# Patient Record
Sex: FEMALE | Race: BLACK OR AFRICAN AMERICAN | Employment: UNEMPLOYED | URBAN - METROPOLITAN AREA
[De-identification: names, ages, dates, MRNs, and addresses within clinical notes are randomized per-mention and may not be internally consistent; named-entity substitution may affect disease eponyms.]

---

## 2017-03-15 ENCOUNTER — OFFICE VISIT (OUTPATIENT)
Dept: FAMILY MEDICINE | Facility: CLINIC | Age: 57
End: 2017-03-15

## 2017-03-15 VITALS
TEMPERATURE: 97.4 F | DIASTOLIC BLOOD PRESSURE: 90 MMHG | BODY MASS INDEX: 27.45 KG/M2 | WEIGHT: 153 LBS | HEART RATE: 81 BPM | OXYGEN SATURATION: 98 % | SYSTOLIC BLOOD PRESSURE: 128 MMHG | RESPIRATION RATE: 18 BRPM

## 2017-03-15 DIAGNOSIS — M81.0 OSTEOPOROSIS: ICD-10-CM

## 2017-03-15 DIAGNOSIS — M54.50 ACUTE BILATERAL LOW BACK PAIN WITHOUT SCIATICA: ICD-10-CM

## 2017-03-15 RX ORDER — ALENDRONATE SODIUM 70 MG/1
70 TABLET ORAL
Qty: 30 TABLET | Refills: 3 | Status: SHIPPED
Start: 2017-03-15 | End: 2017-08-15

## 2017-03-15 ASSESSMENT — PAIN SCALES - GENERAL: PAINLEVEL: MODERATE PAIN (5)

## 2017-03-15 NOTE — PROGRESS NOTES
HPI:       Cindy Espinoza is a 57 year old who presents for the following  Patient presents with:  Back Pain: bilateral low back pain started last night and wrapping around into left side of lower abdomin       This is a 57 year old female with a past medical history significant for chronic back pain and osteoporosis who presented to the clinic with back pain.     Back Pain and lower abdominal pain      Duration: Chronic pain that has progressed         Specific cause: none    Description:   Location of pain: low back left  Character of pain: dull ache  Pain radiation:Radiates to the abdominal area.   New numbness or weakness in legs, not attributed to pain:  No   Any new bowel or bladder incontinence?    No     Intensity: mild    History:   Pain interferes with job/home/school: No   History of back problems: Patient has a history of similar back pains.  Any previous MRI or X-rays: Yes- at [facility].  Date 2012: MRI revealed degenerative disc disease causing mild bilateral neuro foraminal narrowing at L5-S1   Sees a specialist for back pain:  Yes per patient. Discussion regarding different therapies were made including: Physical therapy, steroid injections and surgery.   Therapies tried without relief: Physical therapy was tried but patient only went once.     Alleviating factors:   Improved by: Tylenol       Precipitating factors:  Worsened by: Nothing   Accompanying Signs & Symptoms:  Risk of Fracture:  YES: She has a history of osteoporosis and was previously on Fosamax and Calcium and Vitamin D. She reports that she has been off mentioned medications in a while.   Risk of Cauda Equina:No   Risk of Infection:  No   Risk of Cancer:  No        A [facility]  was used for  this visit.      Problem, Medication and Allergy Lists were   reviewed and are current.     Patient Active Problem List    Diagnosis Date Noted     Low back pain 08/11/2015     Priority: Medium     Diagnosis updated by automated process.  Provider to review and confirm.       Caregiver burden 06/11/2015     TMJ (temporomandibular joint syndrome) 07/23/2014 7/18/14 ENT referral note, ordered CT (results pending), possible need for TMJ clinic referral.       Menopause present 09/11/2012     Fibromyalgia 09/11/2012     Chronic tension headaches 09/11/2012     Osteoporosis 09/11/2012     DEXA 2/1/12       Hypercalcemia 07/03/2012     Possible primary PTH, last endo visit 7/12 - planned US       Abnormal Pap smear of cervix 05/31/2012     Class: Acute     5/31/12:  LSIL pap.  Recommendation- Colposcopy after 2 weeks of premarin vaginal cream.  8/28/12: Colpo - no obvious dysplasia.  Plan pap smear in 2 years - 8/2014.           Current Outpatient Prescriptions   Medication Sig Dispense Refill     alendronate (FOSAMAX) 70 MG tablet Take 1 tablet (70 mg) by mouth every 7 days Take with over 8 ounces water & stay upright for at least 30 minutes after dose.Take at least 60 minutes before breakfast 30 tablet 3     calcium carbonate-vitamin D 600-400 MG-UNIT CHEW Take 1 chew tab by mouth 2 times daily 180 tablet 0     calcium carbonate-vitamin D 500-400 MG-UNIT TABS tablt Take 1 tablet by mouth 2 times daily 180 tablet 3     acetaminophen (TYLENOL) 325 MG tablet Take 2 tablets (650 mg) by mouth every 6 hours as needed for mild pain Do not exceed 3 g acetaminophen from all sources within 24 hours 100 tablet 0     cyclobenzaprine (FLEXERIL) 5 MG tablet Take 1 tablet (5 mg) by mouth nightly as needed for muscle spasms 15 tablet 0     polyethylene glycol (MIRALAX) powder Take 17 g by mouth daily 510 g 1     Cholecalciferol (VITAMIN D) 2000 UNITS tablet Take 1 tablet by mouth daily 100 tablet 3     ORDER FOR DME Equipment being ordered: HEATING PAD 1 Device 0       No Known Allergies  Patient is an established patient of this clinic.         Review of Systems:   Review of Systems Review of system negative except as mentioned in HPI.           Physical Exam:    Patient Vitals for the past 24 hrs:   BP Temp Temp src Pulse Resp SpO2 Weight   03/15/17 1632 128/90 97.4  F (36.3  C) Oral 81 18 98 % 153 lb (69.4 kg)     Body mass index is 27.45 kg/(m^2).  Vitals were reviewed and were normal     Physical Exam   Constitutional: She appears well-developed and well-nourished.   HENT:   Head: Normocephalic and atraumatic.   Eyes: Conjunctivae are normal.   Cardiovascular: Normal rate and regular rhythm.    Pulmonary/Chest: Effort normal and breath sounds normal.   Abdominal: Soft. She exhibits no distension. There is no tenderness.   Musculoskeletal: Normal range of motion. She exhibits no edema, tenderness or deformity.   Unremarkable exam. No pain elicited on ambulation. No vertebral midline tenderness noted. No pain elicited while touching both toes. Hip joint has normal ROM without apprehension. Straight leg raise was not performed.    Skin: Skin is warm. No rash noted.   On exposed skin   Psychiatric: She has a normal mood and affect.       Results:      Results from the last 24 hoursNo results found for this or any previous visit (from the past 24 hour(s)).  Assessment and Plan     Cindy was seen today for back pain.    Diagnoses and all orders for this visit:    Acute bilateral low back pain without sciatica:   Presented with back pain likely secondary to baseline disc disease. MRI in 2012 suggestive of L5-S1 disc disease. Discussed several treatment options: Steroid injections vs Surgery vs Stronger pain medications. Although studies had showed little evidence with physical therapy patient elected to physical therapy for now. Discussed analgesics as needed for pain. I will consider repeating MRI to re-evaluate vertebral joints if pain worsen.   -     [facility], PT, HAND AND CHIROPRACTIC REFERRAL - [facility]  -     Follow up as needed.     Osteoporosis:   Patient has a history of osteoporosis per Dexa scan in 2015. She was put on Fosamax, calcium and Vitamin D but has been off  medication in a while. I will restart medication today. Recommended aquatic exercises but patient declined due to Denominational practice.  -     alendronate (FOSAMAX) 70 MG tablet; Take 1 tablet (70 mg) by mouth every 7 days Take with over 8 ounces water & stay upright for at least 30 minutes after dose.Take at least 60 minutes before breakfast  -     calcium carbonate-vitamin D 600-400 MG-UNIT CHEW; Take 1 chew tab by mouth 2 times daily    Medications Discontinued During This Encounter   Medication Reason     meloxicam (MOBIC) 15 MG tablet      melatonin 3 MG tablet      diclofenac (VOLTAREN) 1 % GEL      camphor-menthol-methyl sal 4-10-30 % CREA      alendronate (FOSAMAX) 70 MG tablet Reorder     Options for treatment and follow-up care were reviewed with the patient. Cindy Espinoza  engaged in the decision making process and verbalized understanding of the options discussed and agreed with the final plan.    Hien Meyer. MD  PGY2 [facility] Resident   Pager: [phone]

## 2017-03-15 NOTE — PROGRESS NOTES
Preceptor Attestation:   Patient seen and discussed with the resident. Assessment and plan reviewed with resident and agreed upon.   Supervising Physician:  Rodolfo Giron MD  [facility]

## 2017-03-15 NOTE — PATIENT INSTRUCTIONS
Here is the plan from today's visit    1. Osteoporosis    - alendronate (FOSAMAX) 70 MG tablet; Take 1 tablet (70 mg) by mouth every 7 days Take with over 8 ounces water & stay upright for at least 30 minutes after dose.Take at least 60 minutes before breakfast  Dispense: 30 tablet; Refill: 3  - calcium carbonate-vitamin D 600-400 MG-UNIT CHEW; Take 1 chew tab by mouth 2 times daily  Dispense: 180 tablet; Refill: 0    2. Acute bilateral low back pain without sciatica    - [facility], PT, HAND AND CHIROPRACTIC REFERRAL - [facility]    Please call or return to clinic if your symptoms don't go away.    Follow up plan: As needed.     Thank you for coming to [facility] today.  Lab Testing:  **If you had lab testing today and your results are reassuring or normal they will be mailed to you or sent through [employer] within 7 days.   **If the lab tests need quick action we will call you with the results.  The phone number we will call with results is # [phone] (home) . If this is not the best number please call our clinic and change the number.  Medication Refills:  If you need any refills please call your pharmacy and they will contact us.   If you need to  your refill at a new pharmacy, please contact the new pharmacy directly. The new pharmacy will help you get your medications transferred faster.   Scheduling:  If you have any concerns about today's visit or wish to schedule another appointment please call our office during normal business hours [phone] (8-5:00 M-F)  If a referral was made to a [facility] and you don't get a call from central scheduling please call [phone].  If a Mammogram was ordered for you at [facility] call [phone] to schedule or change your appointment.  If you had an XRay/CT/Ultrasound/MRI ordered the number is [phone] to schedule or change your radiology appointment.   Medical Concerns:  If you have urgent medical concerns please call  [phone] at any time of the day.

## 2017-03-15 NOTE — MR AVS SNAPSHOT
After Visit Summary   3/15/2017    Cindy Espinoza    MRN: [id number]           Patient Information     Date Of Birth          1960        Visit Information        Provider Department      3/15/2017 4:20 PM Hien Meyer MD [facility]        Today's Diagnoses     Osteoporosis        Acute bilateral low back pain without sciatica          Care Instructions    Here is the plan from today's visit    1. Osteoporosis    - alendronate (FOSAMAX) 70 MG tablet; Take 1 tablet (70 mg) by mouth every 7 days Take with over 8 ounces water & stay upright for at least 30 minutes after dose.Take at least 60 minutes before breakfast  Dispense: 30 tablet; Refill: 3  - calcium carbonate-vitamin D 600-400 MG-UNIT CHEW; Take 1 chew tab by mouth 2 times daily  Dispense: 180 tablet; Refill: 0    2. Acute bilateral low back pain without sciatica    - [facility], PT, HAND AND CHIROPRACTIC REFERRAL - [facility]    Please call or return to clinic if your symptoms don't go away.    Follow up plan: As needed.     Thank you for coming to [facility] today.  Lab Testing:  **If you had lab testing today and your results are reassuring or normal they will be mailed to you or sent through [employer] within 7 days.   **If the lab tests need quick action we will call you with the results.  The phone number we will call with results is # [phone] (home) . If this is not the best number please call our clinic and change the number.  Medication Refills:  If you need any refills please call your pharmacy and they will contact us.   If you need to  your refill at a new pharmacy, please contact the new pharmacy directly. The new pharmacy will help you get your medications transferred faster.   Scheduling:  If you have any concerns about today's visit or wish to schedule another appointment please call our office during normal business hours [phone] (8-5:00 M-F)  If a referral was made to a [facility] and you don't get a call from central scheduling please call [phone].  If a Mammogram was ordered for you at [facility] call [phone] to schedule or change your appointment.  If you had an XRay/CT/Ultrasound/MRI ordered the number is [phone] to schedule or change your radiology appointment.   Medical Concerns:  If you have urgent medical concerns please call [phone] at any time of the day.          Follow-ups after your visit        Additional Services     [facility], PT, HAND AND CHIROPRACTIC REFERRAL - [facility]       **This order will print in the [facility] Scheduling Office**    Physical Therapy, Hand Therapy and Chiropractic Care are available through:    *[facility]  *[facility]  *[facility]    Call one number to schedule at any of the above locations: [phone].    Your provider has referred you to: Physical Therapy at [facility] or [facility]    Indication/Reason for Referral: Back Pain   Onset of Illness: Chronic   Therapy Orders: Evaluate and Treat  Special Programs: None  Special Request: None    Neto Duque     Please be aware that coverage of these services is subject to the terms and limitations of your health insurance plan.  Call member services at your health plan with any benefit or coverage questions.      Please bring the following to your appointment:    *Your personal calendar for scheduling future appointments  *Comfortable clothing                  Who to contact     Please call your clinic at [phone] to:    Ask questions about your health    Make or cancel appointments    Discuss your medicines    Learn about your test results    Speak to your doctor   If you have compliments or concerns about an experience at your clinic, or if you wish to file a complaint, please contact [facility] Patient Relations at [phone] or email us at [email or web address]         Additional  Information About Your Visit        [employer] Information     [employer] is an electronic gateway that provides easy, online access to your medical records. With [employer], you can request a clinic appointment, read your test results, renew a prescription or communicate with your care team.     To sign up for [employer] visit the website at [email or web address]   You will be asked to enter the access code listed below, as well as some personal information. Please follow the directions to create your username and password.     Your access code is: [email or web address]  Expires: 2017  5:02 PM     Your access code will  in 90 days. If you need help or a new code, please contact your [facility] or call [phone] for assistance.        Care EveryWhere ID     This is your Care EveryWhere ID. This could be used by other organizations to access your [facility] medical records  [id number]        Your Vitals Were     Pulse Temperature Respirations Pulse Oximetry Breastfeeding? BMI (Body Mass Index)    81 97.4  F (36.3  C) (Oral) 18 98% No 27.45 kg/m2       Blood Pressure from Last 3 Encounters:   03/15/17 128/90   12/15/16 114/79   16 115/80    Weight from Last 3 Encounters:   03/15/17 153 lb (69.4 kg)   12/15/16 153 lb (69.4 kg)   16 146 lb 9.6 oz (66.5 kg)              We Performed the Following     [facility], PT, HAND AND CHIROPRACTIC REFERRAL - [facility]          Today's Medication Changes          These changes are accurate as of: 3/15/17  5:02 PM.  If you have any questions, ask your nurse or doctor.               These medicines have changed or have updated prescriptions.        Dose/Directions    * calcium carbonate-vitamin D 500-400 MG-UNIT Tabs per tablet   This may have changed:  Another medication with the same name was added. Make sure you understand how and when to take each.   Used for:  Acute bilateral low back pain without sciatica   Changed by:  Pilar Olivo MD         Dose:  1 tablet   Take 1 tablet by mouth 2 times daily   Quantity:  180 tablet   Refills:  3       * calcium carbonate-vitamin D 600-400 MG-UNIT Chew   This may have changed:  You were already taking a medication with the same name, and this prescription was added. Make sure you understand how and when to take each.   Used for:  Osteoporosis   Changed by:  Hien Meyer MD        Dose:  1 chew tab   Take 1 chew tab by mouth 2 times daily   Quantity:  180 tablet   Refills:  0       * Notice:  This list has 2 medication(s) that are the same as other medications prescribed for you. Read the directions carefully, and ask your doctor or other care provider to review them with you.      Stop taking these medicines if you haven't already. Please contact your care team if you have questions.     camphor-menthol-methyl sal 4-10-30 % Crea   Stopped by:  Hien Meyer MD           diclofenac 1 % Gel topical gel   Commonly known as:  VOLTAREN   Stopped by:  Hien Meyer MD           melatonin 3 MG tablet   Stopped by:  Hien Meyer MD           meloxicam 15 MG tablet   Commonly known as:  MOBIC   Stopped by:  Hien Meyer MD                Where to get your medicines      These medications were sent to [address] [address]     Phone:  [phone]     alendronate 70 MG tablet    calcium carbonate-vitamin D 600-400 MG-UNIT Chew                Primary Care Provider Office Phone # Fax #    Diego DO Christian [phone]       [facility] [address] [facility] [address]        Thank you!     Thank you for choosing [facility]  for your care. Our goal is always to provide you with excellent care. Hearing back from our patients is one way we can continue to improve our services. Please take a few minutes to complete the written survey that you may receive in the  mail after your visit with us. Thank you!             Your Updated Medication List - Protect others around you: Learn how to safely use, store and throw away your medicines at www.disposemymeds.org.          This list is accurate as of: 3/15/17  5:02 PM.  Always use your most recent med list.                   Brand Name Dispense Instructions for use    acetaminophen 325 MG tablet    TYLENOL    100 tablet    Take 2 tablets (650 mg) by mouth every 6 hours as needed for mild pain Do not exceed 3 g acetaminophen from all sources within 24 hours       alendronate 70 MG tablet    FOSAMAX    30 tablet    Take 1 tablet (70 mg) by mouth every 7 days Take with over 8 ounces water & stay upright for at least 30 minutes after dose.Take at least 60 minutes before breakfast       * calcium carbonate-vitamin D 500-400 MG-UNIT Tabs per tablet     180 tablet    Take 1 tablet by mouth 2 times daily       * calcium carbonate-vitamin D 600-400 MG-UNIT Chew     180 tablet    Take 1 chew tab by mouth 2 times daily       cyclobenzaprine 5 MG tablet    FLEXERIL    15 tablet    Take 1 tablet (5 mg) by mouth nightly as needed for muscle spasms       order for DME     1 Device    Equipment being ordered: HEATING PAD       polyethylene glycol powder    MIRALAX    510 g    Take 17 g by mouth daily       vitamin D 2000 UNITS tablet     100 tablet    Take 1 tablet by mouth daily       * Notice:  This list has 2 medication(s) that are the same as other medications prescribed for you. Read the directions carefully, and ask your doctor or other care provider to review them with you.

## 2017-04-12 ENCOUNTER — OFFICE VISIT (OUTPATIENT)
Dept: FAMILY MEDICINE | Facility: CLINIC | Age: 57
End: 2017-04-12

## 2017-04-12 VITALS
TEMPERATURE: 97.6 F | SYSTOLIC BLOOD PRESSURE: 113 MMHG | RESPIRATION RATE: 20 BRPM | WEIGHT: 155 LBS | HEART RATE: 83 BPM | DIASTOLIC BLOOD PRESSURE: 77 MMHG | BODY MASS INDEX: 27.81 KG/M2 | OXYGEN SATURATION: 97 %

## 2017-04-12 DIAGNOSIS — M81.0 OSTEOPOROSIS: ICD-10-CM

## 2017-04-12 DIAGNOSIS — R53.83 FATIGUE, UNSPECIFIED TYPE: Primary | ICD-10-CM

## 2017-04-12 LAB
% GRANULOCYTES: 50 %G (ref 40–75)
ALBUMIN SERPL-MCNC: 3.9 MG/DL (ref 3.5–4.7)
ALP SERPL-CCNC: 98.5 U/L (ref 31.7–110.5)
ALT SERPL-CCNC: 18.6 U/L (ref 0–45)
AST SERPL-CCNC: 19.2 U/L (ref 0–45)
BILIRUB SERPL-MCNC: <0.4 MG/DL (ref 0.2–1.3)
BUN SERPL-MCNC: 13.6 MG/DL (ref 7–19)
CALCIUM SERPL-MCNC: 10.3 MG/DL (ref 8.5–10.1)
CHLORIDE SERPLBLD-SCNC: 100.6 MMOL/L (ref 98–110)
CO2 SERPL-SCNC: 27.4 MMOL/L (ref 20–32)
CREAT SERPL-MCNC: 0.5 MG/DL (ref 0.5–1)
GFR SERPL CREATININE-BSD FRML MDRD: >90 ML/MIN/1.7 M2
GLUCOSE SERPL-MCNC: 125 MG'DL (ref 70–99)
GRANULOCYTES #: 2.1 K/UL (ref 1.6–8.3)
HCT VFR BLD AUTO: 40.5 % (ref 35–47)
HEMOGLOBIN: 12.4 G/DL (ref 11.7–15.7)
LYMPHOCYTES # BLD AUTO: 1.7 K/UL (ref 0.8–5.3)
LYMPHOCYTES NFR BLD AUTO: 40.4 %L (ref 20–48)
MAGNESIUM SERPL-MCNC: 2.2 MG/DL (ref 1.6–2.3)
MCH RBC QN AUTO: 28.8 PG (ref 26.5–35)
MCHC RBC AUTO-ENTMCNC: 30.6 G/DL (ref 32–36)
MCV RBC AUTO: 94.1 FL (ref 78–100)
MID #: 0.4 K/UL (ref 0–2.2)
MID %: 9.6 %M (ref 0–20)
PLATELET # BLD AUTO: 184 K/UL (ref 150–450)
POTASSIUM SERPL-SCNC: 3.6 MMOL/DL (ref 3.3–4.5)
PROT SERPL-MCNC: 7.7 G/DL (ref 6.8–8.8)
RBC # BLD AUTO: 4.3 M/UL (ref 3.8–5.2)
SODIUM SERPL-SCNC: 134.5 MMOL/L (ref 132.6–141.4)
TSH SERPL DL<=0.005 MIU/L-ACNC: 1.12 MU/L (ref 0.4–4)
WBC # BLD AUTO: 4.1 K/UL (ref 4–11)

## 2017-04-12 RX ORDER — MULTIPLE VITAMINS W/ MINERALS TAB 9MG-400MCG
1 TAB ORAL DAILY
Qty: 100 TABLET | Refills: 3 | Status: SHIPPED | OUTPATIENT
Start: 2017-04-12 | End: 2020-07-24

## 2017-04-12 NOTE — LETTER
April 13, 2017      Christincristy Olga  [address]  [facility] [place] [facility] [address]        Dear Cindy,    Thank you for getting your care at [facility]. Please see below for your test results. Your results look normal.  Please continue physical therapy.      Resulted Orders   TSH with free T4 reflex   Result Value Ref Range    TSH 1.12 0.40 - 4.00 mU/L   CBC with Diff Plt ([facility])   Result Value Ref Range    WBC 4.1 4.0 - 11.0 K/uL    Lymphocytes # 1.7 0.8 - 5.3 K/uL    % Lymphocytes 40.4 20.0 - 48.0 %L    Mid # 0.4 0.0 - 2.2 K/uL    Mid % 9.6 0.0 - 20.0 %M    GRANULOCYTES # 2.1 1.6 - 8.3 K/uL    % Granulocytes 50.0 40.0 - 75.0 %G    RBC 4.30 3.80 - 5.20 M/uL    Hemoglobin 12.4 11.7 - 15.7 g/dL    Hematocrit 40.5 35.0 - 47.0 %    MCV 94.1 78.0 - 100.0 fL    MCH 28.8 26.5 - 35.0 pg    MCHC 30.6 (L) 32.0 - 36.0 g/dL    Platelets 184.0 150.0 - 450.0 K/uL   Comprehensive Metabolic Panel ([facility])   Result Value Ref Range    Albumin 3.9 3.5 - 4.7 mg/dL    Alkaline Phosphatase 98.5 31.7 - 110.5 U/L    ALT 18.6 0.0 - 45.0 U/L    AST 19.2 0.0 - 45.0 U/L    Bilirubin Total <0.4 0.2 - 1.3 mg/dL    Urea Nitrogen 13.6 7.0 - 19.0 mg/dL    Calcium 10.3 (H) 8.5 - 10.1 mg/dL    Chloride 100.6 98.0 - 110.0 mmol/L    Carbon Dioxide 27.4 20.0 - 32.0 mmol/L    Creatinine 0.5 0.5 - 1.0 mg/dL    Glucose 125.0 (H) 70.0 - 99.0 mg'dL    Potassium 3.6 3.3 - 4.5 mmol/dL    Sodium 134.5 132.6 - 141.4 mmol/L    Protein Total 7.7 6.8 - 8.8 g/dL    GFR Estimate >90 >60.0 mL/min/1.7 m2    GFR Estimate If Black >90 >60.0 mL/min/1.7 m2   Magnesium   Result Value Ref Range    Magnesium 2.2 1.6 - 2.3 mg/dL       If you have any concerns about these results please call and leave a message for me or send a [employer] message to the clinic.    Sincerely,    Diego Gonzales, DO

## 2017-04-12 NOTE — MR AVS SNAPSHOT
After Visit Summary   2017    Cindy Espinoza    MRN: [id number]           Patient Information     Date Of Birth          1960        Visit Information        Provider Department      2017 11:20 AM Diego Gonzales DO [facility]        Today's Diagnoses     Fatigue, unspecified type    -  1    Osteoporosis           Follow-ups after your visit        Who to contact     Please call your clinic at [phone] to:    Ask questions about your health    Make or cancel appointments    Discuss your medicines    Learn about your test results    Speak to your doctor   If you have compliments or concerns about an experience at your clinic, or if you wish to file a complaint, please contact [facility] Patient Relations at [phone] or email us at [email or web address]         Additional Information About Your Visit        MyChart Information     [address] is an electronic gateway that provides easy, online access to your medical records. With [address], you can request a clinic appointment, read your test results, renew a prescription or communicate with your care team.     To sign up for [employer] visit the website at [email or web address]   You will be asked to enter the access code listed below, as well as some personal information. Please follow the directions to create your username and password.     Your access code is: [email or web address]  Expires: 2017  5:02 PM     Your access code will  in 90 days. If you need help or a new code, please contact your [facility] or call [phone] for assistance.        Care EveryWhere ID     This is your Care EveryWhere ID. This could be used by other organizations to access your [place] medical records  [id number]        Your Vitals Were     Pulse Temperature Respirations Pulse Oximetry Breastfeeding? BMI (Body Mass Index)    83 97.6  F (36.4  C) (Oral) 20 97%  No 27.81 kg/m2       Blood Pressure from Last 3 Encounters:   04/12/17 113/77   03/15/17 128/90   12/15/16 114/79    Weight from Last 3 Encounters:   04/12/17 155 lb (70.3 kg)   03/15/17 153 lb (69.4 kg)   12/15/16 153 lb (69.4 kg)              We Performed the Following     CBC with Diff Plt ([facility])     Comprehensive Metabolic Panel ([facility])     Magnesium     TSH with free T4 reflex          Today's Medication Changes          These changes are accurate as of: 4/12/17 11:59 PM.  If you have any questions, ask your nurse or doctor.               Start taking these medicines.        Dose/Directions    multivitamin, therapeutic with minerals Tabs tablet   Used for:  Fatigue, unspecified type   Started by:  Diego Gonzales DO        Dose:  1 tablet   Take 1 tablet by mouth daily   Quantity:  100 tablet   Refills:  3            Where to get your medicines      These medications were sent to [address] [address]     Phone:  [phone]     multivitamin, therapeutic with minerals Tabs tablet                Primary Care Provider Office Phone # Fax #    Diego Gonzales [phone]       [facility] [address]  [facility] [address]        Thank you!     Thank you for choosing [facility]  for your care. Our goal is always to provide you with excellent care. Hearing back from our patients is one way we can continue to improve our services. Please take a few minutes to complete the written survey that you may receive in the mail after your visit with us. Thank you!             Your Updated Medication List - Protect others around you: Learn how to safely use, store and throw away your medicines at www.disposemymeds.org.          This list is accurate as of: 4/12/17 11:59 PM.  Always use your most recent med list.                   Brand Name Dispense Instructions for use    acetaminophen 325 MG tablet     TYLENOL    100 tablet    Take 2 tablets (650 mg) by mouth every 6 hours as needed for mild pain Do not exceed 3 g acetaminophen from all sources within 24 hours       alendronate 70 MG tablet    FOSAMAX    30 tablet    Take 1 tablet (70 mg) by mouth every 7 days Take with over 8 ounces water & stay upright for at least 30 minutes after dose.Take at least 60 minutes before breakfast       calcium carbonate-vitamin D 600-400 MG-UNIT Chew     180 tablet    Take 1 chew tab by mouth 2 times daily       multivitamin, therapeutic with minerals Tabs tablet     100 tablet    Take 1 tablet by mouth daily       polyethylene glycol powder    MIRALAX    510 g    Take 17 g by mouth daily

## 2017-04-12 NOTE — PROGRESS NOTES
HPI:       Cindy Espinoza is a 57 year old who presents for the following  Patient presents with:  Back Pain: Lower back and leg pain     Low back pain, legs and hips.  If she does nay activity standing she gets shaking legs.  Feels weak in the legs.  Shaking legs started Sunday.  Both legs feel shaky.  Also pain in both legs and the proximal thighs.     Started physcal therapy 2 weeks ago.  Doesn't notice much of a difference.  She says she has been doing the exercise.        A [employer]  was used for  this visit.      Problem, Medication and Allergy Lists were reviewed and are current.  Patient is an established patient of this clinic.         Review of Systems:   Review of Systems   Constitutional: Negative for chills and fever.   HENT: Negative for congestion and sore throat.    Eyes: Negative for visual disturbance.   Respiratory: Negative for cough and shortness of breath.    Cardiovascular: Negative for chest pain.   Gastrointestinal: Negative for constipation, diarrhea, nausea and vomiting.   Genitourinary: Negative for difficulty urinating.   Musculoskeletal: Negative for back pain.   Skin: Negative for rash.   Neurological: Positive for weakness (in leg muscles doing activities). Negative for dizziness, light-headedness and headaches.   Psychiatric/Behavioral: Negative for dysphoric mood.             Physical Exam:   Patient Vitals for the past 24 hrs:   BP Temp Temp src Pulse Resp SpO2 Weight   04/12/17 1130 113/77 97.6  F (36.4  C) Oral 83 20 97 % 155 lb (70.3 kg)     Body mass index is 27.81 kg/(m^2).  Vitals were reviewed and were normal     Physical Exam   Constitutional: She appears well-developed.   HENT:   Head: Normocephalic and atraumatic.   Eyes: Conjunctivae are normal.   Pulmonary/Chest: No respiratory distress.   Skin: Skin is warm and dry. No erythema.   Psychiatric: She has a normal mood and affect. Her behavior is normal. Thought content normal.         Results:     Results  for orders placed or performed in visit on 04/12/17   TSH with free T4 reflex   Result Value Ref Range    TSH 1.12 0.40 - 4.00 mU/L   CBC with Diff Plt ([facility])   Result Value Ref Range    WBC 4.1 4.0 - 11.0 K/uL    Lymphocytes # 1.7 0.8 - 5.3 K/uL    % Lymphocytes 40.4 20.0 - 48.0 %L    Mid # 0.4 0.0 - 2.2 K/uL    Mid % 9.6 0.0 - 20.0 %M    GRANULOCYTES # 2.1 1.6 - 8.3 K/uL    % Granulocytes 50.0 40.0 - 75.0 %G    RBC 4.30 3.80 - 5.20 M/uL    Hemoglobin 12.4 11.7 - 15.7 g/dL    Hematocrit 40.5 35.0 - 47.0 %    MCV 94.1 78.0 - 100.0 fL    MCH 28.8 26.5 - 35.0 pg    MCHC 30.6 (L) 32.0 - 36.0 g/dL    Platelets 184.0 150.0 - 450.0 K/uL   Comprehensive Metabolic Panel ([facility])   Result Value Ref Range    Albumin 3.9 3.5 - 4.7 mg/dL    Alkaline Phosphatase 98.5 31.7 - 110.5 U/L    ALT 18.6 0.0 - 45.0 U/L    AST 19.2 0.0 - 45.0 U/L    Bilirubin Total <0.4 0.2 - 1.3 mg/dL    Urea Nitrogen 13.6 7.0 - 19.0 mg/dL    Calcium 10.3 (H) 8.5 - 10.1 mg/dL    Chloride 100.6 98.0 - 110.0 mmol/L    Carbon Dioxide 27.4 20.0 - 32.0 mmol/L    Creatinine 0.5 0.5 - 1.0 mg/dL    Glucose 125.0 (H) 70.0 - 99.0 mg'dL    Potassium 3.6 3.3 - 4.5 mmol/dL    Sodium 134.5 132.6 - 141.4 mmol/L    Protein Total 7.7 6.8 - 8.8 g/dL    GFR Estimate >90 >60.0 mL/min/1.7 m2    GFR Estimate If Black >90 >60.0 mL/min/1.7 m2   Magnesium   Result Value Ref Range    Magnesium 2.2 1.6 - 2.3 mg/dL       Assessment and Plan     1. Fatigue, unspecified type  Electrolytes normal  - multivitamin, therapeutic with minerals (MULTI-VITAMIN) TABS tablet; Take 1 tablet by mouth daily  Dispense: 100 tablet; Refill: 3  - TSH with free T4 reflex  - CBC with Diff Plt ([facility])  - Comprehensive Metabolic Panel ([facility])  - Magnesium    2. Osteoporosis  - DX Hip/Pelvis/Spine (DEXA); Future  There are no discontinued medications.  Options for treatment and follow-up care were reviewed with the patient. Cindy Espinoza  engaged in the decision making process and verbalized  understanding of the options discussed and agreed with the final plan.    Diego Gonzales, DO

## 2017-04-12 NOTE — NURSING NOTE
name: Paradise SANDOVAL  Language: Ukrainian   Agency: [employer]   Phone number: [phone]

## 2017-04-25 ASSESSMENT — ENCOUNTER SYMPTOMS
CHILLS: 0
DYSPHORIC MOOD: 0
NAUSEA: 0
BACK PAIN: 0
SHORTNESS OF BREATH: 0
DIFFICULTY URINATING: 0
LIGHT-HEADEDNESS: 0
DIZZINESS: 0
HEADACHES: 0
FEVER: 0
CONSTIPATION: 0
WEAKNESS: 1
COUGH: 0
VOMITING: 0
SORE THROAT: 0
DIARRHEA: 0

## 2017-04-26 NOTE — PROGRESS NOTES
Preceptor Attestation:   Patient seen and discussed with the resident. Assessment and plan reviewed with resident and agreed upon.   Supervising Physician:  Rishi Danielson MD  [facility]

## 2017-08-15 ENCOUNTER — OFFICE VISIT (OUTPATIENT)
Dept: FAMILY MEDICINE | Facility: CLINIC | Age: 57
End: 2017-08-15

## 2017-08-15 VITALS
BODY MASS INDEX: 28.41 KG/M2 | DIASTOLIC BLOOD PRESSURE: 68 MMHG | SYSTOLIC BLOOD PRESSURE: 104 MMHG | TEMPERATURE: 97.9 F | HEART RATE: 78 BPM | WEIGHT: 154.4 LBS | HEIGHT: 62 IN | OXYGEN SATURATION: 94 %

## 2017-08-15 DIAGNOSIS — M54.50 CHRONIC RIGHT-SIDED LOW BACK PAIN WITHOUT SCIATICA: ICD-10-CM

## 2017-08-15 DIAGNOSIS — M81.0 AGE-RELATED OSTEOPOROSIS WITHOUT CURRENT PATHOLOGICAL FRACTURE: ICD-10-CM

## 2017-08-15 DIAGNOSIS — Z13.1 SCREENING FOR DIABETES MELLITUS: Primary | ICD-10-CM

## 2017-08-15 DIAGNOSIS — G89.29 CHRONIC RIGHT-SIDED LOW BACK PAIN WITHOUT SCIATICA: ICD-10-CM

## 2017-08-15 LAB — HBA1C MFR BLD: 5.6 % (ref 4.1–5.7)

## 2017-08-15 RX ORDER — ALENDRONATE SODIUM 70 MG/1
70 TABLET ORAL
Qty: 30 TABLET | Refills: 3 | Status: SHIPPED | OUTPATIENT
Start: 2017-08-15 | End: 2017-08-15

## 2017-08-15 RX ORDER — ALENDRONATE SODIUM 70 MG/1
70 TABLET ORAL
Qty: 30 TABLET | Refills: 3 | Status: SHIPPED | OUTPATIENT
Start: 2017-08-15 | End: 2018-09-19

## 2017-08-15 NOTE — PROGRESS NOTES
"      HPI:       Cindy Espinoza is a 57 year old who presents for the following  Patient presents with:  Back Pain: lower back pain - right side for three weeks    Has lower back pain and R flank pain. This is the same complaint that she has seen providers for during her past two clinic visits since March. Describes the pain as sharp. Comes and goes.     DEXA from 2015 demonstrated a Z score of > -3 for the lumbar spine and near -3 for the hips bilaterally.    Repeat DEXA was ordered in April of this year, but she was not called so did not get the imaging performed. Physical therapy was ordered in April, she went 10-12 visits and found this helpful, but her order ran out and the pain has been worse since. (If gets re-order for PT, requests [facility] as opposed to [facility]).    Her son is handicapped and she has to do a lot of lifting, care for him. This severely worsens her pain. They do have home help and PCA, but not around-the-clock.    Is concerned about her kidneys. Last Cr 0.5. No oliguria.    Also requests diabetes testing today. No previous A1c on record. No prominent diabetes symptoms.    Adherence and Exercise  Medication side effects: no  How often is a medication missed? Less than 1 time per week  Exercise: Unable to exercise due to her care giving duties for her son.    A [employer]  was used for  this visit.      Problem, Medication and Allergy Lists were reviewed and are current.  Patient is an established patient of this clinic.         Review of Systems:   Review of Systems   Review Of Systems  Musculoskeletal: as above  Neurologic: negative  Endocrine: negative         Physical Exam:     Patient Vitals for the past 24 hrs:   BP Temp Pulse SpO2 Height Weight   08/15/17 1430 104/68 97.9  F (36.6  C) 78 94 % 5' 2\" (157.5 cm) 154 lb 6.4 oz (70 kg)     Body mass index is 28.24 kg/(m^2).  Vitals were reviewed and were normal     Physical Exam  General: Pleasant middle-aged female. Alert, " responsive, nontoxic appearing and comfortable.  HEENT: Normocephalic, atraumatic. No abnormal facies, rashes or deformities noted.   Cardiac: RRR. No murmurs, rubs, or gallops noted.  Pulmonary: CTAB. No wheezes or rales noted. No respiratory distress or use of accessory muscles.  Abdomen: Soft, nontender, nondistended, no masses. Normoactive bowel sounds.  Extremities: No edema appreciated.   Skin: Warm, dry, no rashes noted on exam of exposed skin  Psych: Appropriate mentation and cognition.  Neuro:   Motor: No abnormal posture, tone, atrophy, or movements/fasciculations.   Hip flexor Knee extension Knee flexion Ankle extension Ankle flexion   Right 5 5 5 5 5   Left 5 5 5 5 5    Reflexes:    Patellar   Right 2+   Left 2+    Gait: Normal width, stride length, turn, and arm swing. No deficits in heel or tip-toe walking.     Back Exam     Tenderness   The patient is experiencing tenderness in the R perilumbar musculature and superior to the iliac crest.    Range of Motion   Flexion:                  70  Extension:                30  Lateral Bend Left:     Lateral Bend Right:   Rotation Right:         Abnormal  Rotation Left:           Abnormal  SLR    Right: Negative  Left:    Negative    Muscle Strength   Normal back strength    Tests   Toe Walk: Normal  Heel Walk: Normal          Results:     Results from last visit:  Office Visit on 04/12/2017   Component Date Value Ref Range Status     TSH 04/12/2017 1.12  0.40 - 4.00 mU/L Final     WBC 04/12/2017 4.1  4.0 - 11.0 K/uL Final     Lymphocytes # 04/12/2017 1.7  0.8 - 5.3 K/uL Final     % Lymphocytes 04/12/2017 40.4  20.0 - 48.0 %L Final     Mid # 04/12/2017 0.4  0.0 - 2.2 K/uL Final     Mid % 04/12/2017 9.6  0.0 - 20.0 %M Final     GRANULOCYTES # 04/12/2017 2.1  1.6 - 8.3 K/uL Final     % Granulocytes 04/12/2017 50.0  40.0 - 75.0 %G Final     RBC 04/12/2017 4.30  3.80 - 5.20 M/uL Final     Hemoglobin 04/12/2017 12.4  11.7 - 15.7 g/dL Final     Hematocrit 04/12/2017  40.5  35.0 - 47.0 % Final     MCV 04/12/2017 94.1  78.0 - 100.0 fL Final     MCH 04/12/2017 28.8  26.5 - 35.0 pg Final     MCHC 04/12/2017 30.6* 32.0 - 36.0 g/dL Final     Platelets 04/12/2017 184.0  150.0 - 450.0 K/uL Final     Albumin 04/12/2017 3.9  3.5 - 4.7 mg/dL Final     Alkaline Phosphatase 04/12/2017 98.5  31.7 - 110.5 U/L Final     ALT 04/12/2017 18.6  0.0 - 45.0 U/L Final     AST 04/12/2017 19.2  0.0 - 45.0 U/L Final     Bilirubin Total 04/12/2017 <0.4  0.2 - 1.3 mg/dL Final     Urea Nitrogen 04/12/2017 13.6  7.0 - 19.0 mg/dL Final     Calcium 04/12/2017 10.3* 8.5 - 10.1 mg/dL Final     Chloride 04/12/2017 100.6  98.0 - 110.0 mmol/L Final     Carbon Dioxide 04/12/2017 27.4  20.0 - 32.0 mmol/L Final     Creatinine 04/12/2017 0.5  0.5 - 1.0 mg/dL Final     Glucose 04/12/2017 125.0* 70.0 - 99.0 mg'dL Final     Potassium 04/12/2017 3.6  3.3 - 4.5 mmol/dL Final     Sodium 04/12/2017 134.5  132.6 - 141.4 mmol/L Final     Protein Total 04/12/2017 7.7  6.8 - 8.8 g/dL Final     GFR Estimate 04/12/2017 >90  >60.0 mL/min/1.7 m2 Final     GFR Estimate If Black 04/12/2017 >90  >60.0 mL/min/1.7 m2 Final     Magnesium 04/12/2017 2.2  1.6 - 2.3 mg/dL Final     Assessment and Plan     1. Age-related osteoporosis without current pathological fracture  3. Chronic right-sided low back pain without sciatica  This patient presents with ongoing chronic pain of her R perilumbar musculature. She has a history of diagnosed osteoporosis and R low back pain. She is at risk for pathologic fracture given her unmedicated osteoporosis and daily care-giving duties that require heavy lifting. We re-started her on her osteoporosis medications as below and recommended that she return to physical therapy for ongoing rehab. She declines more intensive PT at this time and would prefer to be seen at [facility] as previously. We also counseled her to heat and stretch the area as needed and to exercise caution and recruit help with the heavy  lifting as she is able. She can follow-up here as needed.  - alendronate (FOSAMAX) 70 MG tablet; Take 1 tablet (70 mg) by mouth every 7 days Take with over 8 ounces water & stay upright for at least 30 minutes after dose.Take at least 60 minutes before breakfast  Dispense: 30 tablet; Refill: 3  - Calcium Carb-Cholecalciferol 600-800 MG-UNIT TABS; Take 600-800 mg by mouth 2 times daily  Dispense: 180 tablet; Refill: 3  - PHYSICAL THERAPY REFERRAL - EXTERNAL    2. Screening for diabetes mellitus  As requested by patient. Screening is warranted given BMI > 25, family history, and no previous screening. Patient is asymptomatic, however.  - Hemoglobin A1c ([facility])    Medications Discontinued During This Encounter   Medication Reason     alendronate (FOSAMAX) 70 MG tablet Reorder     alendronate (FOSAMAX) 70 MG tablet Reorder     Calcium Carb-Cholecalciferol 600-800 MG-UNIT TABS Reorder     calcium carbonate-vitamin D 600-400 MG-UNIT CHEW       Options for treatment and follow-up care were reviewed with the patient. Cindy Espinoza  engaged in the decision making process and verbalized understanding of the options discussed and agreed with the final plan.    IQuinn, M4, am serving as a scribe to document services personally performed by Dr. Ayanna Zimmerman MD and resident Dr. Carrie Gresham MD based on data collection and providers statements to me. The above assessment and plan is the product of our joint decision making.    The medical student acted as scribe and the encounter documented above was completely performed by myself and the documentation reflects the work I have performed today. Carrie Gresham MD

## 2017-08-15 NOTE — PROGRESS NOTES
Preceptor Attestation:   Patient seen and discussed with the resident. Assessment and plan reviewed with resident and agreed upon.   Supervising Physician:  Ayanna Zimmerman MD  [facility]

## 2017-08-15 NOTE — MR AVS SNAPSHOT
After Visit Summary   8/15/2017    Cindy Espinoza    MRN: [id number]           Patient Information     Date Of Birth          1960        Visit Information        Provider Department      8/15/2017 2:20 PM Carrie Gresham MD [facility]        Today's Diagnoses     Screening for diabetes mellitus    -  1    Age-related osteoporosis without current pathological fracture        Chronic right-sided low back pain without sciatica          Care Instructions    Here is the plan from today's visit    1. Age-related osteoporosis without current pathological fracture  - alendronate (FOSAMAX) 70 MG tablet; Take 1 tablet (70 mg) by mouth every 7 days Take with over 8 ounces water & stay upright for at least 30 minutes after dose.Take at least 60 minutes before breakfast  Dispense: 30 tablet; Refill: 3  - Calcium Carb-Cholecalciferol 600-800 MG-UNIT TABS; Take 600-800 mg by mouth 2 times daily  Dispense: 180 tablet; Refill: 3    2. Screening for diabetes mellitus  - Hemoglobin A1c ([facility])    3. Chronic right-sided low back pain without sciatica  - PHYSICAL THERAPY REFERRAL - EXTERNAL    Please call or return to clinic if your symptoms don't go away.    Follow up plan  Follow up if no improvement in 6-8 weeks with physical therapy.     Thank you for coming to [facility] today.  Lab Testing:  **If you had lab testing today and your results are reassuring or normal they will be mailed to you or sent through [employer] within 7 days.   **If the lab tests need quick action we will call you with the results.  The phone number we will call with results is # [phone] (home) . If this is not the best number please call our clinic and change the number.  Medication Refills:  If you need any refills please call your pharmacy and they will contact us.   If you need to  your refill at a new pharmacy, please contact the new pharmacy directly. The new pharmacy will help you get your  medications transferred faster.   Scheduling:  If you have any concerns about today's visit or wish to schedule another appointment please call our office during normal business hours [phone] (8-5:00 M-F)  If a referral was made to a [facility] and you don't get a call from central scheduling please call [phone].  If a Mammogram was ordered for you at [facility] call [phone] to schedule or change your appointment.  If you had an XRay/CT/Ultrasound/MRI ordered the number is [phone] to schedule or change your radiology appointment.   Medical Concerns:  If you have urgent medical concerns please call [phone] at any time of the day.            Follow-ups after your visit        Additional Services     PHYSICAL THERAPY REFERRAL - EXTERNAL       [facility]  [address] [facility]   [phone]    [facility]   [address] [facility] [place]  [phone]    Referral to [facility]                  Who to contact     Please call your clinic at [phone] to:    Ask questions about your health    Make or cancel appointments    Discuss your medicines    Learn about your test results    Speak to your doctor   If you have compliments or concerns about an experience at your clinic, or if you wish to file a complaint, please contact [facility] Patient Relations at [phone] or email us at [email or web address]         Additional Information About Your Visit        [employer] Information     [email or web address] is an electronic gateway that provides easy, online access to your medical records. With [email or web address], you can request a clinic appointment, read your test results, renew a prescription or communicate with your care team.     To sign up for [employer]â„¢t visit the website at [email or web address]   You will be asked to enter the access code listed below, as well as some personal information. Please follow the directions to create your username and  "password.     Your access code is: [email or web address]  Expires: 2017  3:28 PM     Your access code will  in 90 days. If you need help or a new code, please contact your [facility] or call [phone] for assistance.        Care EveryWhere ID     This is your Care EveryWhere ID. This could be used by other organizations to access your [facility] medical records  [id number]        Your Vitals Were     Pulse Temperature Height Pulse Oximetry BMI (Body Mass Index)       78 97.9  F (36.6  C) 5' 2\" (157.5 cm) 94% 28.24 kg/m2        Blood Pressure from Last 3 Encounters:   08/15/17 104/68   17 113/77   03/15/17 128/90    Weight from Last 3 Encounters:   08/15/17 154 lb 6.4 oz (70 kg)   17 155 lb (70.3 kg)   03/15/17 153 lb (69.4 kg)              We Performed the Following     Hemoglobin A1c ([facility])     PHYSICAL THERAPY REFERRAL - EXTERNAL          Today's Medication Changes          These changes are accurate as of: 8/15/17  3:28 PM.  If you have any questions, ask your nurse or doctor.               Start taking these medicines.        Dose/Directions    Calcium Carb-Cholecalciferol 600-800 MG-UNIT Tabs   Used for:  Age-related osteoporosis without current pathological fracture   Started by:  Carrie Gresham MD        Dose:  600-800 mg   Take 600-800 mg by mouth 2 times daily   Quantity:  180 tablet   Refills:  3            Where to get your medicines      These medications were sent to [facility] [address] [address]     Phone:  [phone]     Calcium Carb-Cholecalciferol 600-800 MG-UNIT Tabs         Some of these will need a paper prescription and others can be bought over the counter.  Ask your nurse if you have questions.     Bring a paper prescription for each of these medications     alendronate 70 MG tablet                Primary Care Provider Office Phone # Fax #    Walter Kulkarni MD " [phone]       [facility] [address] [place] [id number]        Equal Access to Services     GYPSY BELTRE : Hadii aad ku hadkayodekatherine Hong, александрluis nationpramodha, lakeisha branbrittani albert, cooper rivera abijames masters laWilliepaddy lind. So [facility] [phone].    ATENCIÓN: Si habla español, tiene a lopez disposición servicios gratuitos de asistencia lingüística. Llame al [phone].    We comply with applicable federal civil rights laws and [place] laws. We do not discriminate on the basis of race, color, national origin, age, disability sex, sexual orientation or gender identity.            Thank you!     Thank you for choosing [facility]  for your care. Our goal is always to provide you with excellent care. Hearing back from our patients is one way we can continue to improve our services. Please take a few minutes to complete the written survey that you may receive in the mail after your visit with us. Thank you!             Your Updated Medication List - Protect others around you: Learn how to safely use, store and throw away your medicines at www.disposemymeds.org.          This list is accurate as of: 8/15/17  3:28 PM.  Always use your most recent med list.                   Brand Name Dispense Instructions for use Diagnosis    acetaminophen 325 MG tablet    TYLENOL    100 tablet    Take 2 tablets (650 mg) by mouth every 6 hours as needed for mild pain Do not exceed 3 g acetaminophen from all sources within 24 hours    Bilateral low back pain with left-sided sciatica       alendronate 70 MG tablet    FOSAMAX    30 tablet    Take 1 tablet (70 mg) by mouth every 7 days Take with over 8 ounces water & stay upright for at least 30 minutes after dose.Take at least 60 minutes before breakfast    Age-related osteoporosis without current pathological fracture       Calcium Carb-Cholecalciferol 600-800 MG-UNIT Tabs     180 tablet    Take 600-800 mg by mouth 2 times  daily    Age-related osteoporosis without current pathological fracture       calcium carbonate-vitamin D 600-400 MG-UNIT Chew     180 tablet    Take 1 chew tab by mouth 2 times daily    Osteoporosis       multivitamin, therapeutic with minerals Tabs tablet     100 tablet    Take 1 tablet by mouth daily    Fatigue, unspecified type       polyethylene glycol powder    MIRALAX    510 g    Take 17 g by mouth daily    Constipation

## 2017-08-15 NOTE — PATIENT INSTRUCTIONS
Here is the plan from today's visit    1. Age-related osteoporosis without current pathological fracture  - alendronate (FOSAMAX) 70 MG tablet; Take 1 tablet (70 mg) by mouth every 7 days Take with over 8 ounces water & stay upright for at least 30 minutes after dose.Take at least 60 minutes before breakfast  Dispense: 30 tablet; Refill: 3  - Calcium Carb-Cholecalciferol 600-800 MG-UNIT TABS; Take 600-800 mg by mouth 2 times daily  Dispense: 180 tablet; Refill: 3    2. Screening for diabetes mellitus  - Hemoglobin A1c ([facility])    3. Chronic right-sided low back pain without sciatica  - PHYSICAL THERAPY REFERRAL - EXTERNAL    Please call or return to clinic if your symptoms don't go away.    Follow up plan  Follow up if no improvement in 6-8 weeks with physical therapy.     Thank you for coming to [facility] today.  Lab Testing:  **If you had lab testing today and your results are reassuring or normal they will be mailed to you or sent through [employer] within 7 days.   **If the lab tests need quick action we will call you with the results.  The phone number we will call with results is # [phone] (home) . If this is not the best number please call our clinic and change the number.  Medication Refills:  If you need any refills please call your pharmacy and they will contact us.   If you need to  your refill at a new pharmacy, please contact the new pharmacy directly. The new pharmacy will help you get your medications transferred faster.   Scheduling:  If you have any concerns about today's visit or wish to schedule another appointment please call our office during normal business hours [phone] (8-5:00 M-F)  If a referral was made to a [facility] and you don't get a call from central scheduling please call [phone].  If a Mammogram was ordered for you at [facility] call [phone] to schedule or change your appointment.  If you had an XRay/CT/Ultrasound/MRI  ordered the number is [phone] to schedule or change your radiology appointment.   Medical Concerns:  If you have urgent medical concerns please call [phone] at any time of the day.

## 2017-08-15 NOTE — LETTER
August 15, 2017      Cindy Espinoza  [address]  [place] [address]        Dear Cindy,    Thank you for getting your care at [facility]. Please see below for your test results.    Resulted Orders   Hemoglobin A1c ([facility])   Result Value Ref Range    Hemoglobin A1C 5.6 4.1 - 5.7 %       If you have any questions, please call the clinic to make an appointment with Dr. Gresham for a clinic visit.    Sincerely,    Carrie Gresham MD

## 2017-12-22 ENCOUNTER — OFFICE VISIT (OUTPATIENT)
Dept: FAMILY MEDICINE | Facility: CLINIC | Age: 57
End: 2017-12-22
Payer: COMMERCIAL

## 2017-12-22 VITALS
BODY MASS INDEX: 28.9 KG/M2 | OXYGEN SATURATION: 95 % | SYSTOLIC BLOOD PRESSURE: 101 MMHG | HEART RATE: 89 BPM | TEMPERATURE: 97.3 F | DIASTOLIC BLOOD PRESSURE: 69 MMHG | WEIGHT: 158 LBS

## 2017-12-22 DIAGNOSIS — Z12.31 VISIT FOR SCREENING MAMMOGRAM: ICD-10-CM

## 2017-12-22 DIAGNOSIS — M79.18 ABDOMINAL MUSCLE PAIN: Primary | ICD-10-CM

## 2017-12-22 DIAGNOSIS — Z12.11 SPECIAL SCREENING FOR MALIGNANT NEOPLASMS, COLON: ICD-10-CM

## 2017-12-22 ASSESSMENT — ENCOUNTER SYMPTOMS
EYE ITCHING: 0
DYSURIA: 0
EYE PAIN: 0
SORE THROAT: 0
BLOOD IN STOOL: 0
SHORTNESS OF BREATH: 0
COUGH: 0
FEVER: 0
FREQUENCY: 0
DIFFICULTY URINATING: 0
APPETITE CHANGE: 0
VOMITING: 0
DIAPHORESIS: 0
EYE DISCHARGE: 0
DIZZINESS: 0
LIGHT-HEADEDNESS: 0
HEADACHES: 0
ABDOMINAL PAIN: 1
RHINORRHEA: 0
CONSTIPATION: 1
CHILLS: 0
NAUSEA: 0
DIARRHEA: 0

## 2017-12-22 NOTE — PATIENT INSTRUCTIONS
Here is the plan from today's visit    1. Abdominal muscle pain  You may use tylenol for pain relief. If symptoms worsen or change, please return to clinic for recheck.       2. Visit for screening mammogram    - MA Screening Digital Bilateral; Future    3. Special screening for malignant neoplasms, colon    - Fecal colorectal cancer screen FITT; Future          Please call or return to clinic if your symptoms don't go away.    Follow up plan  Please make a clinic appointment for follow up with me (CROW CRUZ) as needed.    Thank you for coming to [facility] today.  Lab Testing:  **If you had lab testing today and your results are reassuring or normal they will be mailed to you or sent through [employer] within 7 days.   **If the lab tests need quick action we will call you with the results.  The phone number we will call with results is # [phone] (home) . If this is not the best number please call our clinic and change the number.  Medication Refills:  If you need any refills please call your pharmacy and they will contact us.   If you need to  your refill at a new pharmacy, please contact the new pharmacy directly. The new pharmacy will help you get your medications transferred faster.   Scheduling:  If you have any concerns about today's visit or wish to schedule another appointment please call our office during normal business hours [phone] (8-5:00 M-F)  If a referral was made to a [facility] and you don't get a call from central scheduling please call [phone].  If a Mammogram was ordered for you at [facility] call [phone] to schedule or change your appointment.  If you had an XRay/CT/Ultrasound/MRI ordered the number is [phone] to schedule or change your radiology appointment.   Medical Concerns:  If you have urgent medical concerns please call [phone] at any time of the day.  If you have a medical emergency please call [phone].

## 2017-12-22 NOTE — PROGRESS NOTES
HPI:       Cindy Espinoza is a 57 year old who presents for the following  Patient presents with:  Abdominal Pain: Pt is complaining of mid section dull pain when stretching. x2 wks    Reports pain in the upper middle part of the belly. When tries to contract the abdominal muscles, she feels pain. No relationship to food. Has been present for the last 2 weeks, off and on.  Has constipation, no diarrhea, n/v. Has not tried anything for the pain. Had chicken pox growing up.       A [employer]ision  was used for  this visit.    Problem, Medication and Allergy Lists were reviewed and are current.  Patient is an established patient of this clinic.         Review of Systems:   Review of Systems   Constitutional: Negative for appetite change, chills, diaphoresis and fever.   HENT: Negative for congestion, rhinorrhea and sore throat.    Eyes: Negative for pain, discharge and itching.   Respiratory: Negative for cough and shortness of breath.    Cardiovascular: Negative for chest pain.   Gastrointestinal: Positive for abdominal pain and constipation. Negative for blood in stool, diarrhea, nausea and vomiting.   Genitourinary: Negative for difficulty urinating, dysuria and frequency.   Neurological: Negative for dizziness, syncope, light-headedness and headaches.             Physical Exam:   Patient Vitals for the past 24 hrs:   BP Temp Temp src Pulse SpO2 Weight   12/22/17 1139 101/69 97.3  F (36.3  C) Oral 89 95 % 158 lb (71.7 kg)     Body mass index is 28.9 kg/(m^2).  Vitals were reviewed and were normal     Physical Exam   Constitutional: She appears well-developed and well-nourished. No distress.   HENT:   Head: Normocephalic and atraumatic.   Right Ear: External ear normal.   Left Ear: External ear normal.   Mouth/Throat: Oropharynx is clear and moist. No oropharyngeal exudate.   Eyes: Conjunctivae and EOM are normal. Pupils are equal, round, and reactive to light. No scleral icterus.   Neck: Normal range of  motion. Neck supple.   Cardiovascular: Normal rate, regular rhythm and normal heart sounds.    Pulmonary/Chest: Effort normal and breath sounds normal. No respiratory distress. She has no wheezes.   Abdominal: Soft. Bowel sounds are normal. She exhibits no distension. There is no rigidity, no rebound, no guarding and negative Paige's sign. No hernia.       Musculoskeletal: Normal range of motion.   Neurological: She is alert.   Skin: She is not diaphoretic.   Psychiatric: She has a normal mood and affect.         Results:     No investigations ordered     Assessment and Plan     1. Abdominal muscle pain  Patient here with a localized superficial reproducible pain in the RUQ. She does not have any symptoms to suggest any underlying GI process- negative rona, N/V, diarrhea. Has not had any abdominal surgeries in the past. Her pain appears to be very superficial but she describes it as a dull pain and not a burning pain. There is a low suspicion for Herpes zoster, educated patient on this. Encouraged her to return if symptoms change or she develops a rash. Possible that she has MSK pain- recommend using tylenol for pain relief. Return if symptoms change or worsen.     2. Visit for screening mammogram  - MA Screening Digital Bilateral; Future    3. Special screening for malignant neoplasms, colon  - Fecal colorectal cancer screen FITT; Future  There are no discontinued medications.  Options for treatment and follow-up care were reviewed with the patient. Cindy Espinoza  engaged in the decision making process and verbalized understanding of the options discussed and agreed with the final plan.    Janis Elizalde MD MPH  PGY3- [facility], Family Medicine  Pager- [phone]

## 2017-12-22 NOTE — MR AVS SNAPSHOT
After Visit Summary   12/22/2017    Cindy Espinoza    MRN: [id number]           Patient Information     Date Of Birth          1960        Visit Information        Provider Department      12/22/2017 11:20 AM Janis Cruz MD [facility]        Today's Diagnoses     Abdominal muscle pain    -  1    Visit for screening mammogram        Special screening for malignant neoplasms, colon          Care Instructions    Here is the plan from today's visit    1. Abdominal muscle pain  You may use tylenol for pain relief. If symptoms worsen or change, please return to clinic for recheck.       2. Visit for screening mammogram    - MA Screening Digital Bilateral; Future    3. Special screening for malignant neoplasms, colon    - Fecal colorectal cancer screen FITT; Future          Please call or return to clinic if your symptoms don't go away.    Follow up plan  Please make a clinic appointment for follow up with me (JANIS CRUZ) as needed.    Thank you for coming to [facility] today.  Lab Testing:  **If you had lab testing today and your results are reassuring or normal they will be mailed to you or sent through Codefied within 7 days.   **If the lab tests need quick action we will call you with the results.  The phone number we will call with results is # [phone] (home) . If this is not the best number please call our clinic and change the number.  Medication Refills:  If you need any refills please call your pharmacy and they will contact us.   If you need to  your refill at a new pharmacy, please contact the new pharmacy directly. The new pharmacy will help you get your medications transferred faster.   Scheduling:  If you have any concerns about today's visit or wish to schedule another appointment please call our office during normal business hours [phone] (8-5:00 M-F)  If a referral was made to a [facility] and you don't get a  "call from central scheduling please call [phone].  If a Mammogram was ordered for you at [facility] call [phone] to schedule or change your appointment.  If you had an XRay/CT/Ultrasound/MRI ordered the number is [phone] to schedule or change your radiology appointment.   Medical Concerns:  If you have urgent medical concerns please call [phone] at any time of the day.  If you have a medical emergency please call 911.            Follow-ups after your visit        Your next 10 appointments already scheduled     Jan 02, 2018  2:00 PM CST   (Arrive by 1:45 PM)   MA SCREENING DIGITAL BILATERAL with [id number]   [facility] ([facility])    [address]  [address] [facility] [address] [phone]           Do not use any powder, lotion or deodorant under your arms or on your breast. If you do, we will ask you to remove it before your exam.  Wear comfortable, two-piece clothing.  If you have any allergies, tell your care team.  Bring any previous mammograms from other facilities or have them mailed to the breast center. Three-dimensional (3D) mammograms are available at [facility] locations in [facility], [facility], and [place]. [facility] locations include [place] in [place]. Benefits of 3D mammograms include: - Improved rate of cancer detection - Decreases your chance of having to go back for more tests, which means fewer: - \"False-positive\" results (This means that there is an abnormal area but it isn't cancer.) - Invasive testing procedures, such as a biopsy or surgery - Can provide clearer images of the breast if you have dense breast tissue. 3D mammography is an optional exam that anyone can have with a 2D mammogram. It doesn't replace or take the place of a 2D mammogram. 2D mammograms remain an effective screening test for all women.  Not all " insurance companies cover the cost of a 3D mammogram. Check with your insurance.              Future tests that were ordered for you today     Open Future Orders        Priority Expected Expires Ordered    Fecal colorectal cancer screen FITT Routine 2018 3/16/2018 2017    MA Screening Digital Bilateral Routine  2018    MA Screening Digital Bilateral Routine  2018            Who to contact     Please call your clinic at [phone] to:    Ask questions about your health    Make or cancel appointments    Discuss your medicines    Learn about your test results    Speak to your doctor   If you have compliments or concerns about an experience at your clinic, or if you wish to file a complaint, please contact [facility] Patient Relations at [phone] or email us at [email or web address]         Additional Information About Your Visit        Metrasens Information     Metrasens is an electronic gateway that provides easy, online access to your medical records. With Metrasens, you can request a clinic appointment, read your test results, renew a prescription or communicate with your care team.     To sign up for Metrasens visit the website at [email or web address]   You will be asked to enter the access code listed below, as well as some personal information. Please follow the directions to create your username and password.     Your access code is: [email or web address]  Expires: 3/22/2018 12:00 PM     Your access code will  in 90 days. If you need help or a new code, please contact your [facility] or call [phone] for assistance.        Care EveryWhere ID     This is your Care EveryWhere ID. This could be used by other organizations to access your [place] medical records  [id number]        Your Vitals Were     Pulse Temperature Pulse Oximetry Breastfeeding? BMI (Body Mass Index)       89 97.3  F (36.3  C)  (Oral) 95% No 28.9 kg/m2        Blood Pressure from Last 3 Encounters:   12/22/17 101/69   08/15/17 104/68   04/12/17 113/77    Weight from Last 3 Encounters:   12/22/17 158 lb (71.7 kg)   08/15/17 154 lb 6.4 oz (70 kg)   04/12/17 155 lb (70.3 kg)               Primary Care Provider Office Phone # Fax #    Walter Kulkarni [phone]       [facility] [address]   [place] [address]        Equal Access to Services     [facility]: Hadii maryan miranda hadasho Soomaali, waaxda luqadaha, qaybta kaalmada adejamesyaluis, cooper stewart . So [facility] [phone].    ATENCIÓN: Si habla español, tiene a lopez disposición servicios gratuitos de asistencia lingüística. [facility] [phone].    We comply with applicable federal civil rights laws and [place] laws. We do not discriminate on the basis of race, color, national origin, age, disability, sex, sexual orientation, or gender identity.            Thank you!     Thank you for choosing [facility]  for your care. Our goal is always to provide you with excellent care. Hearing back from our patients is one way we can continue to improve our services. Please take a few minutes to complete the written survey that you may receive in the mail after your visit with us. Thank you!             Your Updated Medication List - Protect others around you: Learn how to safely use, store and throw away your medicines at www.disposemymeds.org.          This list is accurate as of: 12/22/17 12:02 PM.  Always use your most recent med list.                   Brand Name Dispense Instructions for use Diagnosis    acetaminophen 325 MG tablet    TYLENOL    100 tablet    Take 2 tablets (650 mg) by mouth every 6 hours as needed for mild pain Do not exceed 3 g acetaminophen from all sources within 24 hours    Bilateral low back pain with left-sided sciatica       alendronate 70 MG tablet    FOSAMAX    30 tablet    Take 1  tablet (70 mg) by mouth every 7 days Take with over 8 ounces water & stay upright for at least 30 minutes after dose.Take at least 60 minutes before breakfast    Age-related osteoporosis without current pathological fracture       Calcium Carb-Cholecalciferol 600-800 MG-UNIT Tabs     180 tablet    Take 600-800 mg by mouth 2 times daily    Age-related osteoporosis without current pathological fracture       multivitamin, therapeutic with minerals Tabs tablet     100 tablet    Take 1 tablet by mouth daily    Fatigue, unspecified type       polyethylene glycol powder    MIRALAX    510 g    Take 17 g by mouth daily    Constipation

## 2017-12-22 NOTE — PROGRESS NOTES
Preceptor Attestation:   Patient seen and discussed with the resident. Assessment and plan reviewed with resident and agreed upon.   Supervising Physician:  [facility]

## 2017-12-28 ENCOUNTER — HOSPITAL ENCOUNTER (OUTPATIENT)
Facility: CLINIC | Age: 57
Setting detail: SPECIMEN
Discharge: HOME | End: 2017-12-28
Admitting: FAMILY MEDICINE
Payer: COMMERCIAL

## 2017-12-28 PROCEDURE — 82274 ASSAY TEST FOR BLOOD FECAL: CPT | Performed by: FAMILY MEDICINE

## 2017-12-30 DIAGNOSIS — Z12.11 SPECIAL SCREENING FOR MALIGNANT NEOPLASMS, COLON: ICD-10-CM

## 2017-12-30 LAB — HEMOCCULT STL QL IA: NEGATIVE

## 2018-01-02 ENCOUNTER — RADIANT APPOINTMENT (OUTPATIENT)
Dept: MAMMOGRAPHY | Facility: CLINIC | Age: 58
End: 2018-01-02
Payer: COMMERCIAL

## 2018-01-02 DIAGNOSIS — Z12.39 SCREENING BREAST EXAMINATION: ICD-10-CM

## 2018-04-11 ENCOUNTER — OFFICE VISIT (OUTPATIENT)
Dept: FAMILY MEDICINE | Facility: CLINIC | Age: 58
End: 2018-04-11
Payer: COMMERCIAL

## 2018-04-11 VITALS
RESPIRATION RATE: 18 BRPM | BODY MASS INDEX: 28.3 KG/M2 | WEIGHT: 154.7 LBS | OXYGEN SATURATION: 95 % | DIASTOLIC BLOOD PRESSURE: 87 MMHG | SYSTOLIC BLOOD PRESSURE: 127 MMHG | HEART RATE: 90 BPM

## 2018-04-11 DIAGNOSIS — E55.9 VITAMIN D DEFICIENCY: ICD-10-CM

## 2018-04-11 DIAGNOSIS — M81.8 OTHER OSTEOPOROSIS, UNSPECIFIED PATHOLOGICAL FRACTURE PRESENCE: ICD-10-CM

## 2018-04-11 DIAGNOSIS — G89.29 CHRONIC LOW BACK PAIN WITH SCIATICA, SCIATICA LATERALITY UNSPECIFIED, UNSPECIFIED BACK PAIN LATERALITY: ICD-10-CM

## 2018-04-11 DIAGNOSIS — M54.40 CHRONIC LOW BACK PAIN WITH SCIATICA, SCIATICA LATERALITY UNSPECIFIED, UNSPECIFIED BACK PAIN LATERALITY: ICD-10-CM

## 2018-04-11 DIAGNOSIS — K59.01 SLOW TRANSIT CONSTIPATION: ICD-10-CM

## 2018-04-11 DIAGNOSIS — E21.3 HYPERPARATHYROIDISM (H): Primary | ICD-10-CM

## 2018-04-11 LAB
ALBUMIN SERPL-MCNC: 4.4 MG/DL (ref 3.5–4.7)
ALP SERPL-CCNC: 100.8 U/L (ref 31.7–110.5)
ALT SERPL-CCNC: 29.3 U/L (ref 0–45)
AST SERPL-CCNC: 21.8 U/L (ref 0–45)
BILIRUB SERPL-MCNC: <0.4 MG/DL (ref 0.2–1.3)
BUN SERPL-MCNC: 17.1 MG/DL (ref 7–19)
CALCIUM SERPL-MCNC: 10.8 MG/DL (ref 8.5–10.1)
CHLORIDE SERPLBLD-SCNC: 102.9 MMOL/L (ref 98–110)
CO2 SERPL-SCNC: 26.6 MMOL/L (ref 20–32)
CREAT SERPL-MCNC: 0.7 MG/DL (ref 0.5–1)
GFR SERPL CREATININE-BSD FRML MDRD: >90 ML/MIN/1.7 M2
GLUCOSE SERPL-MCNC: 117.3 MG'DL (ref 70–99)
MISCELLANEOUS TEST: NORMAL
PHOSPHATE SERPL-MCNC: 2.7 MG/DL (ref 2.5–4.5)
POTASSIUM SERPL-SCNC: 3.7 MMOL/DL (ref 3.3–4.5)
PROT SERPL-MCNC: 8.1 G/DL (ref 6.8–8.8)
PTH-INTACT SERPL-MCNC: 97 PG/ML (ref 18–80)
SODIUM SERPL-SCNC: 137.7 MMOL/L (ref 132.6–141.4)

## 2018-04-11 RX ORDER — IBUPROFEN 600 MG/1
600 TABLET, FILM COATED ORAL EVERY 6 HOURS PRN
Qty: 30 TABLET | Refills: 1 | Status: SHIPPED | OUTPATIENT
Start: 2018-04-11 | End: 2018-06-18

## 2018-04-11 RX ORDER — POLYETHYLENE GLYCOL 3350 17 G/17G
1 POWDER, FOR SOLUTION ORAL DAILY
Qty: 510 G | Refills: 1 | Status: SHIPPED | OUTPATIENT
Start: 2018-04-11 | End: 2019-01-02

## 2018-04-11 RX ORDER — CHOLECALCIFEROL (VITAMIN D3) 50 MCG
2000 TABLET ORAL DAILY
Qty: 100 TABLET | Refills: 3 | Status: SHIPPED | OUTPATIENT
Start: 2018-04-11 | End: 2019-01-02

## 2018-04-11 NOTE — LETTER
April 24, 2018      Cindy Olga  [address]  [place] [address]        Dear Cindy,    Thank you for getting your care at [facility]. Please see below for your test results. They are reassuring.     Resulted Orders   Parathyroid Hormone Intact   Result Value Ref Range    Parathyroid Hormone Intact 97 (H) 18 - 80 pg/mL   Comprehensive Metabolic Panel ([facility])   Result Value Ref Range    Albumin 4.4 3.5 - 4.7 mg/dL    Alkaline Phosphatase 100.8 31.7 - 110.5 U/L    ALT 29.3 0.0 - 45.0 U/L    AST 21.8 0.0 - 45.0 U/L    Bilirubin Total <0.4 0.2 - 1.3 mg/dL    Urea Nitrogen 17.1 7.0 - 19.0 mg/dL    Calcium 10.8 (H) 8.5 - 10.1 mg/dL    Chloride 102.9 98.0 - 110.0 mmol/L    Carbon Dioxide 26.6 20.0 - 32.0 mmol/L    Creatinine 0.7 0.5 - 1.0 mg/dL    Glucose 117.3 (H) 70.0 - 99.0 mg'dL    Potassium 3.7 3.3 - 4.5 mmol/dL    Sodium 137.7 132.6 - 141.4 mmol/L    Protein Total 8.1 6.8 - 8.8 g/dL    GFR Estimate >90 >60.0 mL/min/1.7 m2    GFR Estimate If Black >90 >60.0 mL/min/1.7 m2   Vitamin D Level   Result Value Ref Range    Vitamin D Deficiency screening 20 20 - 75 ug/L      Comment:      Season, race, dietary intake, and treatment affect the concentration of   25-hydroxy-Vitamin D. Values may decrease during winter months and increase   during summer months. Values 20-29 ug/L may indicate Vitamin D insufficiency   and values <20 ug/L may indicate Vitamin D deficiency.  Vitamin D determination is routinely performed by an immunoassay specific for   25 hydroxyvitamin D3.  If an individual is on vitamin D2 (ergocalciferol)   supplementation, please specify 25 OH vitamin D2 and D3 level determination by   LCMSMS test VITD23.     1,25 Dihydroxy Vitamin D to [place]: Laboratory Miscellaneous Order   Result Value Ref Range    Miscellaneous Test Canceled, Test credited         Comment:      Test reordered as correct code  [id number]     Phosphorus   Result Value Ref Range    Phosphorus 2.7 2.5 - 4.5 mg/dL    1,25 Dihydroxyvitamin D   Result Value Ref Range    1,25 Dihydroxyvitamin D 50.8 19.9 - 79.3 pg/mL       If you have any concerns about these results please call and leave a message for me or send a Periscope message to the clinic.    Sincerely,    Shira Cummins MD

## 2018-04-11 NOTE — MR AVS SNAPSHOT
After Visit Summary   4/11/2018    Cindy Espinoza    MRN: [id number]           Patient Information     Date Of Birth          1960        Visit Information        Provider Department      4/11/2018 3:40 PM Valentin Cummins MD [facility]        Today's Diagnoses     Hyperparathyroidism (H)    -  1    Chronic low back pain with sciatica, sciatica laterality unspecified, unspecified back pain laterality        Slow transit constipation        Vitamin D deficiency          Care Instructions    Here is the plan from today's visit    1. Hyperparathyroidism (H)  - Parathyroid Hormone Intact  - Comprehensive Metabolic Panel ([facility])  - Vitamin D Level  - 1,25 Dihydroxy Vitamin D to [place]: Laboratory Miscellaneous Order  - Phosphorus    2. Chronic low back pain with sciatica, sciatica laterality unspecified, unspecified back pain laterality  - ibuprofen (ADVIL/MOTRIN) 600 MG tablet; Take 1 tablet (600 mg) by mouth every 6 hours as needed for moderate pain  Dispense: 30 tablet; Refill: 1  - recommended PT but declined by you since you are busy to take care of your son.  - recommend massage and hot packs     3. Slow transit constipation  - polyethylene glycol (MIRALAX) powder; Take 17 g (1 capful) by mouth daily  Dispense: 510 g; Refill: 1    4. Vitamin D deficiency  - Cholecalciferol (VITAMIN D) 2000 units tablet; Take 2,000 Units by mouth daily  Dispense: 100 tablet; Refill: 3      Please call or return to clinic if your symptoms don't go away.    Follow up plan  Please make a clinic appointment for follow up with me (VALENTIN CUMMINS) in 2-4  weeks for back pain and results.    Thank you for coming to [facility] today.  Lab Testing:  **If you had lab testing today and your results are reassuring or normal they will be mailed to you or sent through [employer] within 7 days.   **If the lab tests need quick action we will call you with the results.  The phone number we will call  with results is # [phone] (home) . If this is not the best number please call our clinic and change the number.  Medication Refills:  If you need any refills please call your pharmacy and they will contact us.   If you need to  your refill at a new pharmacy, please contact the new pharmacy directly. The new pharmacy will help you get your medications transferred faster.   Scheduling:  If you have any concerns about today's visit or wish to schedule another appointment please call our office during normal business hours [phone] (8-5:00 M-F)  If a referral was made to a [facility] and you don't get a call from central scheduling please call [phone].  If a Mammogram was ordered for you at [facility] call [phone] to schedule or change your appointment.  If you had an XRay/CT/Ultrasound/MRI ordered the number is [phone] to schedule or change your radiology appointment.   Medical Concerns:  If you have urgent medical concerns please call [phone] at any time of the day.    Shira Cummins MD            Follow-ups after your visit        Who to contact     Please call your clinic at [phone] to:    Ask questions about your health    Make or cancel appointments    Discuss your medicines    Learn about your test results    Speak to your doctor            Additional Information About Your Visit        MyChart Information     El[employer]t is an electronic gateway that provides easy, online access to your medical records. With Dobleas, you can request a clinic appointment, read your test results, renew a prescription or communicate with your care team.     To sign up for El[employer]t visit the website at [email or web address]   You will be asked to enter the access code listed below, as well as some personal information. Please follow the directions to create your username and password.     Your access code is: [email or web address]  Expires: 7/10/2018  4:27 PM      Your access code will  in 90 days. If you need help or a new code, please contact your [facility] or call [phone] for assistance.        Care EveryWhere ID     This is your Care EveryWhere ID. This could be used by other organizations to access your [place] medical records  [id number]        Your Vitals Were     Pulse Respirations Pulse Oximetry BMI (Body Mass Index)          90 18 95% 28.3 kg/m2         Blood Pressure from Last 3 Encounters:   18 127/87   17 101/69   08/15/17 104/68    Weight from Last 3 Encounters:   18 154 lb 11.2 oz (70.2 kg)   17 158 lb (71.7 kg)   08/15/17 154 lb 6.4 oz (70 kg)              We Performed the Following     1,25 Dihydroxy Vitamin D to [place]: Laboratory Miscellaneous Order     Comprehensive Metabolic Panel ([facility])     Parathyroid Hormone Intact     Phosphorus     Vitamin D Level          Today's Medication Changes          These changes are accurate as of 18  4:27 PM.  If you have any questions, ask your nurse or doctor.               Start taking these medicines.        Dose/Directions    ibuprofen 600 MG tablet   Commonly known as:  ADVIL/MOTRIN   Used for:  Chronic low back pain with sciatica, sciatica laterality unspecified, unspecified back pain laterality   Started by:  Shira Cummins MD        Dose:  600 mg   Take 1 tablet (600 mg) by mouth every 6 hours as needed for moderate pain   Quantity:  30 tablet   Refills:  1       vitamin D 2000 units tablet   Used for:  Vitamin D deficiency   Started by:  Shira Cummins MD        Dose:  2000 Units   Take 2,000 Units by mouth daily   Quantity:  100 tablet   Refills:  3            Where to get your medicines      These medications were sent to [facility] - [place], [place] - [address] [place] [address]     Phone:  [phone]     ibuprofen 600 MG tablet    polyethylene glycol powder    vitamin D 2000 units  tablet                Primary Care Provider Office Phone # Fax #    Walter Kulkarni [phone]       [facility] [address]   [facility] [address]        Equal Access to Services     GYPSY BELTRE : Hadii aad ku hadkayodeo Solauraali, waaxda luqadaha, qaybta kaalmada adesandra, cooper rivera abijames masters terry lind. So [facility] [phone].    ATENCIÓN: Si habla español, tiene a lopez disposición servicios gratuitos de asistencia lingüística. Cl al [phone].    We comply with applicable federal civil rights laws and [place] laws. We do not discriminate on the basis of race, color, national origin, age, disability, sex, sexual orientation, or gender identity.            Thank you!     Thank you for choosing [facility]  for your care. Our goal is always to provide you with excellent care. Hearing back from our patients is one way we can continue to improve our services. Please take a few minutes to complete the written survey that you may receive in the mail after your visit with us. Thank you!             Your Updated Medication List - Protect others around you: Learn how to safely use, store and throw away your medicines at www.disposemymeds.org.          This list is accurate as of 4/11/18  4:27 PM.  Always use your most recent med list.                   Brand Name Dispense Instructions for use Diagnosis    acetaminophen 325 MG tablet    TYLENOL    100 tablet    Take 2 tablets (650 mg) by mouth every 6 hours as needed for mild pain Do not exceed 3 g acetaminophen from all sources within 24 hours    Bilateral low back pain with left-sided sciatica       alendronate 70 MG tablet    FOSAMAX    30 tablet    Take 1 tablet (70 mg) by mouth every 7 days Take with over 8 ounces water & stay upright for at least 30 minutes after dose.Take at least 60 minutes before breakfast    Age-related osteoporosis without current pathological fracture       Calcium  Carb-Cholecalciferol 600-800 MG-UNIT Tabs     180 tablet    Take 600-800 mg by mouth 2 times daily    Age-related osteoporosis without current pathological fracture       ibuprofen 600 MG tablet    ADVIL/MOTRIN    30 tablet    Take 1 tablet (600 mg) by mouth every 6 hours as needed for moderate pain    Chronic low back pain with sciatica, sciatica laterality unspecified, unspecified back pain laterality       multivitamin, therapeutic with minerals Tabs tablet     100 tablet    Take 1 tablet by mouth daily    Fatigue, unspecified type       polyethylene glycol powder    MIRALAX    510 g    Take 17 g (1 capful) by mouth daily    Slow transit constipation       vitamin D 2000 units tablet     100 tablet    Take 2,000 Units by mouth daily    Vitamin D deficiency

## 2018-04-11 NOTE — PROGRESS NOTES
Preceptor Attestation:   Patient seen, evaluated and discussed with the resident. I have verified the content of the note, which accurately reflects my assessment of the patient and the plan of care.   Supervising Physician:  Arleth Phelps MD

## 2018-04-11 NOTE — PATIENT INSTRUCTIONS
Here is the plan from today's visit    1. Hyperparathyroidism (H)  - Parathyroid Hormone Intact  - Comprehensive Metabolic Panel ([facility])  - Vitamin D Level  - 1,25 Dihydroxy Vitamin D to [place]: Laboratory Miscellaneous Order  - Phosphorus    2. Chronic low back pain with sciatica, sciatica laterality unspecified, unspecified back pain laterality  - ibuprofen (ADVIL/MOTRIN) 600 MG tablet; Take 1 tablet (600 mg) by mouth every 6 hours as needed for moderate pain  Dispense: 30 tablet; Refill: 1  - recommended PT but declined by you since you are busy to take care of your son.  - recommend massage and hot packs     3. Slow transit constipation  - polyethylene glycol (MIRALAX) powder; Take 17 g (1 capful) by mouth daily  Dispense: 510 g; Refill: 1    4. Vitamin D deficiency  - Cholecalciferol (VITAMIN D) 2000 units tablet; Take 2,000 Units by mouth daily  Dispense: 100 tablet; Refill: 3      Please call or return to clinic if your symptoms don't go away.    Follow up plan  Please make a clinic appointment for follow up with me (VALENTIN TIWARI) in 2-4  weeks for back pain and results.    Thank you for coming to [facility] today.  Lab Testing:  **If you had lab testing today and your results are reassuring or normal they will be mailed to you or sent through Pidefarma within 7 days.   **If the lab tests need quick action we will call you with the results.  The phone number we will call with results is # [phone] (home) . If this is not the best number please call our clinic and change the number.  Medication Refills:  If you need any refills please call your pharmacy and they will contact us.   If you need to  your refill at a new pharmacy, please contact the new pharmacy directly. The new pharmacy will help you get your medications transferred faster.   Scheduling:  If you have any concerns about today's visit or wish to schedule another appointment please call our office during normal business hours  [phone] (8-5:00 M-F)  If a referral was made to a [facility] and you don't get a call from central scheduling please call [phone].  If a Mammogram was ordered for you at [facility] call [phone] to schedule or change your appointment.  If you had an XRay/CT/Ultrasound/MRI ordered the number is [phone] to schedule or change your radiology appointment.   Medical Concerns:  If you have urgent medical concerns please call [phone] at any time of the day.    Shira Cummins MD

## 2018-04-11 NOTE — PROGRESS NOTES
"      HPI:       Cindy Espinoza is a 58 year old who presents for the following  Patient presents with:  RECHECK: f/u lower back pain. still alot of pain in back and right hip, shooting pain down right leg.  Abdominal Pain: recheck right side pain. external pain that moves around    Patient is here today with following issues: low back pain R>L, shooting down to the R thigh, sometimes to the R arm. RUQ swelling and tenderness \" that moves\".     Takes Tylenol for pain, not really helpful.     H/o parathyroid adenoma and primary hyperparathyroidism. Last time seen by Endocrinology in 2015 and surgical removal recommended but declined by the patient. LAst parathyroid US performed in 2012.     A Apiphany  was used for  this visit.      Problem, Medication and Allergy Lists were reviewed and are current.  Patient is an established patient of this clinic.         Review of Systems:   Review of Systems   Constitutional: Positive for fatigue. Negative for chills and fever.   Gastrointestinal: Positive for abdominal distention and abdominal pain.   Musculoskeletal: Positive for back pain (low, R sided).   All other systems reviewed and are negative.            Physical Exam:   Patient Vitals for the past 24 hrs:   BP Pulse Resp SpO2 Weight   04/11/18 1600 127/87 90 18 95 % 154 lb 11.2 oz (70.2 kg)     Body mass index is 28.3 kg/(m^2).  Vitals were reviewed and were normal     Physical Exam   Constitutional: She is oriented to person, place, and time. She appears well-developed and well-nourished. No distress.   HENT:   Head: Normocephalic and atraumatic.   Eyes: Conjunctivae are normal.   Neck: Normal range of motion. Neck supple.   Cardiovascular: Normal rate, regular rhythm and normal heart sounds.    No murmur heard.  Pulmonary/Chest: Effort normal and breath sounds normal.   Abdominal: Soft. Bowel sounds are normal. She exhibits no distension. There is tenderness (mild, generalized, more pronounced in L mid " abdomen).   No Paige sign   Musculoskeletal: Normal range of motion. She exhibits tenderness (R lower back) and deformity (extensive lumbar lordosis). She exhibits no edema.        Right hip: She exhibits normal range of motion, normal strength and no tenderness.        Left hip: She exhibits normal range of motion, normal strength and no tenderness.        Legs:  Neurological: She is alert and oriented to person, place, and time. She has normal strength. A cranial nerve deficit (facial droop to the left, chonic) is present. No sensory deficit.   Skin: Skin is warm and dry. She is not diaphoretic.   Psychiatric: She has a normal mood and affect.         Results:     Following labs were ordered today:  CMP, PTH. 25 and 1.25 Vit D, Phosphate.  Assessment and Plan     1. Chronic low back pain with sciatica, R sided  - ibuprofen (ADVIL/MOTRIN) 600 MG tablet; Take 1 tablet (600 mg) by mouth every 6 hours as needed for moderate pain  Dispense: 30 tablet; Refill: 1  - recommended PT but declined by the patient since she is busy to take care of her son.  - recommend massage and hot packs     2. Slow transit constipation with mildly diffuse tenderness to palpation on the physical examination.   - polyethylene glycol (MIRALAX) powder; Take 17 g (1 capful) by mouth daily  Dispense: 510 g; Refill: 1    3. Hyperparathyroidism (H)  - Parathyroid Hormone Intact  - Comprehensive Metabolic Panel ([facility])  - Vitamin D Level  - 1,25 Dihydroxy Vitamin D to [place]: Laboratory Miscellaneous Order  - Phosphorus    4. Vitamin D deficiency  - Cholecalciferol (VITAMIN D) 2000 units tablet; Take 2,000 Units by mouth daily  Dispense: 100 tablet; Refill: 3    5. Osteoporosis:  - continue with Alendronate weekly (4/5 year)  - patient is due for DEXA - will discuss at the next visit    Follow up plan: in 2-4  weeks for back pain and results    There are no discontinued medications.  Options for treatment and follow-up care were reviewed with the  patient. Cindy Dickeyjose c  engaged in the decision making process and verbalized understanding of the options discussed and agreed with the final plan.    Shira Cummins MD, PhD  [facility],  PGY-3 Family Medicine Resident  #[id number]

## 2018-04-12 LAB
1,25(OH)2D SERPL-MCNC: 50.8 PG/ML (ref 19.9–79.3)
DEPRECATED CALCIDIOL+CALCIFEROL SERPL-MC: 20 UG/L (ref 20–75)

## 2018-04-12 ASSESSMENT — ENCOUNTER SYMPTOMS
FATIGUE: 1
FEVER: 0
BACK PAIN: 1
ABDOMINAL PAIN: 1
CHILLS: 0
ABDOMINAL DISTENTION: 1

## 2018-06-18 DIAGNOSIS — M54.40 CHRONIC LOW BACK PAIN WITH SCIATICA, SCIATICA LATERALITY UNSPECIFIED, UNSPECIFIED BACK PAIN LATERALITY: ICD-10-CM

## 2018-06-18 DIAGNOSIS — G89.29 CHRONIC LOW BACK PAIN WITH SCIATICA, SCIATICA LATERALITY UNSPECIFIED, UNSPECIFIED BACK PAIN LATERALITY: ICD-10-CM

## 2018-06-18 NOTE — TELEPHONE ENCOUNTER

## 2018-06-19 RX ORDER — IBUPROFEN 600 MG/1
600 TABLET, FILM COATED ORAL EVERY 6 HOURS PRN
Qty: 30 TABLET | Refills: 1 | Status: SHIPPED | OUTPATIENT
Start: 2018-06-19 | End: 2018-09-10

## 2018-06-27 ENCOUNTER — OFFICE VISIT (OUTPATIENT)
Dept: FAMILY MEDICINE | Facility: CLINIC | Age: 58
End: 2018-06-27
Payer: COMMERCIAL

## 2018-06-27 ENCOUNTER — RADIANT APPOINTMENT (OUTPATIENT)
Dept: GENERAL RADIOLOGY | Facility: CLINIC | Age: 58
End: 2018-06-27
Attending: FAMILY MEDICINE
Payer: COMMERCIAL

## 2018-06-27 ENCOUNTER — TELEPHONE (OUTPATIENT)
Dept: FAMILY MEDICINE | Facility: CLINIC | Age: 58
End: 2018-06-27

## 2018-06-27 ENCOUNTER — TELEPHONE (OUTPATIENT)
Dept: ENDOCRINOLOGY | Facility: CLINIC | Age: 58
End: 2018-06-27

## 2018-06-27 VITALS
DIASTOLIC BLOOD PRESSURE: 80 MMHG | SYSTOLIC BLOOD PRESSURE: 117 MMHG | HEART RATE: 82 BPM | OXYGEN SATURATION: 97 % | WEIGHT: 157.8 LBS | RESPIRATION RATE: 14 BRPM | BODY MASS INDEX: 28.86 KG/M2

## 2018-06-27 DIAGNOSIS — M54.41 CHRONIC BILATERAL LOW BACK PAIN WITH RIGHT-SIDED SCIATICA: Primary | ICD-10-CM

## 2018-06-27 DIAGNOSIS — M54.16 LUMBAR RADICULOPATHY: Primary | ICD-10-CM

## 2018-06-27 DIAGNOSIS — E21.3 HYPERPARATHYROIDISM (H): ICD-10-CM

## 2018-06-27 DIAGNOSIS — Z78.9 HEALTH MAINTENANCE ALTERATION: ICD-10-CM

## 2018-06-27 DIAGNOSIS — M81.8 OTHER OSTEOPOROSIS, UNSPECIFIED PATHOLOGICAL FRACTURE PRESENCE: ICD-10-CM

## 2018-06-27 DIAGNOSIS — M54.16 LUMBAR RADICULOPATHY: ICD-10-CM

## 2018-06-27 DIAGNOSIS — G89.29 CHRONIC BILATERAL LOW BACK PAIN WITH RIGHT-SIDED SCIATICA: Primary | ICD-10-CM

## 2018-06-27 RX ORDER — OMEGA-3 FATTY ACIDS/FISH OIL 300-1000MG
1 CAPSULE ORAL DAILY
Qty: 90 CAPSULE
Start: 2018-06-27 | End: 2020-07-24

## 2018-06-27 RX ORDER — LIDOCAINE 50 MG/G
PATCH TOPICAL
Qty: 30 PATCH | Refills: 0 | Status: SHIPPED | OUTPATIENT
Start: 2018-06-27 | End: 2018-06-28

## 2018-06-27 NOTE — PATIENT INSTRUCTIONS
Here is the plan from today's visit    1. Lumbar radiculopathy  - X-ray lumbar spine 2-3 views*; Future  - lidocaine (LIDODERM) 5 % Patch; Apply up to 3 patches to painful area at once for up to 12 h within a 24 h period.  Remove after 12 hours.  Dispense: 30 patch; Refill: 0    2. Hyperparathyroidism (H)  - DX Hip/Pelvis/Spine; Future  - ENDOCRINOLOGY ADULT REFERRAL - INTERNAL    3. Other osteoporosis, unspecified pathological fracture presence  - X-ray lumbar spine 2-3 views*; Future  - DX Hip/Pelvis/Spine; Future  - ENDOCRINOLOGY ADULT REFERRAL - INTERNAL    Please call or return to clinic if your symptoms don't go away.    Follow up plan  Please make a clinic appointment for follow up with a DO in 1 week for follow-up and OMT.    Thank you for coming to [facility] today.  Lab Testing:  **If you had lab testing today and your results are reassuring or normal they will be mailed to you or sent through [employer]Post within 7 days.   **If the lab tests need quick action we will call you with the results.  The phone number we will call with results is # [phone] (home) . If this is not the best number please call our clinic and change the number.  Medication Refills:  If you need any refills please call your pharmacy and they will contact us.   If you need to  your refill at a new pharmacy, please contact the new pharmacy directly. The new pharmacy will help you get your medications transferred faster.   Scheduling:  If you have any concerns about today's visit or wish to schedule another appointment please call our office during normal business hours [phone] (8-5:00 M-F)  If a referral was made to a [facility] and you don't get a call from central scheduling please call [phone].  If a Mammogram was ordered for you at [facility] call [phone] to schedule or change your appointment.  If you had an XRay/CT/Ultrasound/MRI ordered the number is [phone] to  schedule or change your radiology appointment.   Medical Concerns:  If you have urgent medical concerns please call [phone] at any time of the day.    Donna Pryor, DO      Endocrinology referral    [phone]  Forwarded to Endocrinology for review/scheduling.           Thanks!     Vonnie

## 2018-06-27 NOTE — MR AVS SNAPSHOT
After Visit Summary   6/27/2018    Cindy Espinoza    MRN: [id number]           Patient Information     Date Of Birth          1960        Visit Information        Provider Department      6/27/2018 9:40 AM Donna Pryor DO [facility]        Today's Diagnoses     Lumbar radiculopathy    -  1    Hyperparathyroidism (H)        Other osteoporosis, unspecified pathological fracture presence          Care Instructions    Here is the plan from today's visit    1. Lumbar radiculopathy  - X-ray lumbar spine 2-3 views*; Future  - lidocaine (LIDODERM) 5 % Patch; Apply up to 3 patches to painful area at once for up to 12 h within a 24 h period.  Remove after 12 hours.  Dispense: 30 patch; Refill: 0    2. Hyperparathyroidism (H)  - DX Hip/Pelvis/Spine; Future  - ENDOCRINOLOGY ADULT REFERRAL - INTERNAL    3. Other osteoporosis, unspecified pathological fracture presence  - X-ray lumbar spine 2-3 views*; Future  - DX Hip/Pelvis/Spine; Future  - ENDOCRINOLOGY ADULT REFERRAL - INTERNAL    Please call or return to clinic if your symptoms don't go away.    Follow up plan  Please make a clinic appointment for follow up with bouchra VIERA in 1 week for follow-up and OMT.    Thank you for coming to [facility] today.  Lab Testing:  **If you had lab testing today and your results are reassuring or normal they will be mailed to you or sent through [employer] within 7 days.   **If the lab tests need quick action we will call you with the results.  The phone number we will call with results is # [phone] (home) . If this is not the best number please call our clinic and change the number.  Medication Refills:  If you need any refills please call your pharmacy and they will contact us.   If you need to  your refill at a new pharmacy, please contact the new pharmacy directly. The new pharmacy will help you get your medications transferred faster.   Scheduling:  If you have any concerns about today's  visit or wish to schedule another appointment please call our office during normal business hours [phone] (8-5:00 M-F)  If a referral was made to a [facility] and you don't get a call from central scheduling please call [phone].  If a Mammogram was ordered for you at [facility] call [phone] to schedule or change your appointment.  If you had an XRay/CT/Ultrasound/MRI ordered the number is [phone] to schedule or change your radiology appointment.   Medical Concerns:  If you have urgent medical concerns please call [phone] at any time of the day.    Donna Pryor, DO            Follow-ups after your visit        Additional Services     ENDOCRINOLOGY ADULT REFERRAL - INTERNAL       Your provider has referred you to: [facility]: Endocrinology [facility] - [place] [phone]   [email or web address]    Please be aware that coverage of these services is subject to the terms and limitations of your health insurance plan.  Call member services at your health plan with any benefit or coverage questions.      Please bring the following to your appointment:    >>   Any x-rays, CTs or MRIs which have been performed.  Contact the facility where they were done to arrange for  prior to your scheduled appointment.    >>   List of current medications   >>   This referral request   >>   Any documents/labs given to you for this referral                  Follow-up notes from your care team     Return in about 1 week (around 7/4/2018) for OMT and results.      Future tests that were ordered for you today     Open Future Orders        Priority Expected Expires Ordered    X-ray lumbar spine 2-3 views* Routine 6/27/2018 6/27/2019 6/27/2018    DX Hip/Pelvis/Spine Routine  6/27/2019 6/27/2018            Who to contact     Please call your clinic at [phone] to:    Ask questions about your health    Make or cancel  appointments    Discuss your medicines    Learn about your test results    Speak to your doctor            Additional Information About Your Visit        Care EveryWhere ID     This is your Care EveryWhere ID. This could be used by other organizations to access your [facility] medical records  [id number]        Your Vitals Were     Pulse Respirations Pulse Oximetry BMI (Body Mass Index)          82 14 97% 28.86 kg/m2         Blood Pressure from Last 3 Encounters:   06/27/18 117/80   04/11/18 127/87   12/22/17 101/69    Weight from Last 3 Encounters:   06/27/18 157 lb 12.8 oz (71.6 kg)   04/11/18 154 lb 11.2 oz (70.2 kg)   12/22/17 158 lb (71.7 kg)              We Performed the Following     ENDOCRINOLOGY ADULT REFERRAL - INTERNAL          Today's Medication Changes          These changes are accurate as of 6/27/18  9:48 AM.  If you have any questions, ask your nurse or doctor.               Start taking these medicines.        Dose/Directions    lidocaine 5 % Patch   Commonly known as:  LIDODERM   Used for:  Lumbar radiculopathy   Started by:  Donna Pryor, DO        Apply up to 3 patches to painful area at once for up to 12 h within a 24 h period.  Remove after 12 hours.   Quantity:  30 patch   Refills:  0            Where to get your medicines      These medications were sent to [facility] - [address] [address]     Phone:  [phone]     lidocaine 5 % Patch                Primary Care Provider Office Phone # Fax #    Walter Kulkarni [phone]       Formerly [facility] [address] [address]        Equal Access to Services     SIERRA BELTRE : Hadkristin esposito Sosherrie, waaxda luqadaha, qaybta kaalmacooper padilla. So [facility] [phone].    ATENCIÓN: Si habla español, tiene a lopez disposición servicios gratuitos de asistencia lingüística. Llame al  [phone].    We comply with applicable federal civil rights laws and [place] laws. We do not discriminate on the basis of race, color, national origin, age, disability, sex, sexual orientation, or gender identity.            Thank you!     Thank you for choosing [facility]  for your care. Our goal is always to provide you with excellent care. Hearing back from our patients is one way we can continue to improve our services. Please take a few minutes to complete the written survey that you may receive in the mail after your visit with us. Thank you!             Your Updated Medication List - Protect others around you: Learn how to safely use, store and throw away your medicines at www.disposemymeds.org.          This list is accurate as of 6/27/18  9:48 AM.  Always use your most recent med list.                   Brand Name Dispense Instructions for use Diagnosis    acetaminophen 325 MG tablet    TYLENOL    100 tablet    Take 2 tablets (650 mg) by mouth every 6 hours as needed for mild pain Do not exceed 3 g acetaminophen from all sources within 24 hours    Bilateral low back pain with left-sided sciatica       alendronate 70 MG tablet    FOSAMAX    30 tablet    Take 1 tablet (70 mg) by mouth every 7 days Take with over 8 ounces water & stay upright for at least 30 minutes after dose.Take at least 60 minutes before breakfast    Age-related osteoporosis without current pathological fracture       Calcium Carb-Cholecalciferol 600-800 MG-UNIT Tabs     180 tablet    Take 600-800 mg by mouth 2 times daily    Age-related osteoporosis without current pathological fracture       ibuprofen 600 MG tablet    ADVIL/MOTRIN    30 tablet    Take 1 tablet (600 mg) by mouth every 6 hours as needed for moderate pain    Chronic low back pain with sciatica, sciatica laterality unspecified, unspecified back pain laterality       lidocaine 5 % Patch    LIDODERM    30 patch    Apply up to 3 patches to painful  area at once for up to 12 h within a 24 h period.  Remove after 12 hours.    Lumbar radiculopathy       multivitamin, therapeutic with minerals Tabs tablet     100 tablet    Take 1 tablet by mouth daily    Fatigue, unspecified type       polyethylene glycol powder    MIRALAX    510 g    Take 17 g (1 capful) by mouth daily    Slow transit constipation       vitamin D 2000 units tablet     100 tablet    Take 2,000 Units by mouth daily    Vitamin D deficiency

## 2018-06-27 NOTE — TELEPHONE ENCOUNTER
[facility] phone call message- medication clarification/question:    Full Medication Name: lidocaine (LIDODERM) 5 % Patch   Dose: Apply up to 3 patches to painful area at once for up to 12 h within a 24 h period.  Remove after 12 hours.    Question: Manjit, Pharm D, calling to advise that med noted above is not covered by insurance, but lidocaine ointment is covered. Capsaicin cream would also be covered.     Pharmacy confirmed as      [facility] - [place], [place] - [facility] [address]  : Yes    OK to leave a message on voice mail? Yes    Primary language: Dominican      needed? Yes    Call taken on June 27, 2018 at 2:34 PM by Kesha Mckeon

## 2018-06-27 NOTE — TELEPHONE ENCOUNTER
Patient seen in clinic today by Dr. Pryor. Message sent to provider. See note below. Medication not covered by insurance but alternatives listed. Please send alternative to pharmacy and discontinue Lidoderm patches if appropriate.    Tabitha Andrade RN

## 2018-06-27 NOTE — TELEPHONE ENCOUNTER
To schedulers: Please schedule  for lst available endocrine    Karen Aguilar MD  Endocrine triage      [facility]    Phone Message    May a detailed message be left on voicemail: yes    Reason for Call: Other: Received referral from Dr. Donna Pryor from [facility] today for patient to be seen for hyperparathyroidism and osteoarthritis, referral and records are in EPIC.      Action Taken: Message routed to:  [facility] Endocrinology Clinic

## 2018-06-27 NOTE — PROGRESS NOTES
HPI:       Cindy Espinoza is a 58 year old who presents for the following  Patient presents with:  Back Pain: back pain that radiates to both legs    Patient presents with 1 month of worsening low back pain which radates into lateral RLE.   Has h/o back pain, states that pain has been gradually worsening.  Will occasionally get LLE pain as well.   Denies any numbness, tingling or weakness.   Has been offered PT in the past, but states that she doesn't have time to do this..   States she was taking alendronate weekly, but stopped a few months ago - states it is hard to remember when something is dosed only once a week.   Does not recall seeing an endocrinologist for her hyperparathyroidism. Doesn't recall being offered surgery (parathyroidectomy) for this.   Taking ibuprofen without relief.     XR: normal SI joints in 2012  MRI 2012 - multilevel joint disease.     A ShuttleCloud  was used for  this visit.      Problem, Medication and Allergy Lists were reviewed and are current.  Patient is an established patient of this clinic.         Review of Systems:   Review of Systems   As per HPI       Physical Exam:   Patient Vitals for the past 24 hrs:   BP Pulse Resp SpO2 Weight   06/27/18 0921 117/80 82 14 97 % 157 lb 12.8 oz (71.6 kg)     Body mass index is 28.86 kg/(m^2).  Vitals were reviewed and were normal     Physical Exam   Constitutional: She is oriented to person, place, and time. She appears well-developed.   HENT:   Head: Normocephalic and atraumatic.   Eyes: Conjunctivae are normal.   Neck: Normal range of motion. Neck supple.   Pulmonary/Chest: Effort normal.   Musculoskeletal:   Tenderness to palpation of paraspinal muscles at L2-L5 bilaterally. Negative SLR. Patellar reflexes 2+. Light touch sensation intact in all dermatomes.    Neurological: She is alert and oriented to person, place, and time.   Skin: Skin is warm and dry.   Psychiatric: She has a normal mood and affect. Her behavior is normal.  Judgment and thought content normal.         Results:     DEXA  HISTORY:         COMPARISON: 2/1/2012     Age[age] years.  Height: 63 inches  Weight: 140 pounds  Sex: Female  Ethnicity: White     Image quality: Adequate     Lumbar spine T-score in region of L1-L4 = -3.3   L1-4 percent change: -4%      HIPS:  Mean total hip T-score: 0.2  Mean total hip percent change: -2.7%      Left femoral neck T-score = -0.6  Right femoral neck T-score = -0.7      COMPARISON TO PRIOR:  Percent change in the lumbar spine and hips is significant accounting  to the precision errors for this facility.      FRAX:  10 year probability of major osteoporotic fracture: 2.5%  10 year probability of hip fracture: 0.1%  The 10 year probability of fracture may be lower than reported if the  patient has received treatment. FRAX data should be disregarded in  patient's taking bisphosphonates.     World Health Organization definition of osteoporosis and osteopenia  for  women:   Normal: T-score at or above -1.0  Low Bone Mass (Osteopenia): T-score between -1.0 and -2.5.   Osteoporosis: T-score at or below -2.5   T-scores are reported for postmenopausal women and men over 50 years  of age.     IMPRESSION  IMPRESSION:    Osteoporosis. Decrease in bone mineral density from the comparison  examination.     MARYJO DRAPER MD       X-ray Lumbar Spine 2-3 Views*    Result Date: 6/27/2018  Exam: 2 views of the lumbar spine dated 6/27/2018. COMPARISON: MRI dated 1/12/2012. CLINICAL HISTORY: Lumbar radiculopathy. FINDINGS: AP and lateral views of the lumbar spine were obtained. There are 5 lumbar type vertebral bodies for the purposes of this dictation. The lumbar vertebral bodies are well aligned and well-maintained without evidence of compression fracture. Minimal disc space narrowing in the lower lumbar spine.     IMPRESSION: Minimal disc space narrowing in the lower lumbar spine, without evidence of compression fracture. HATTIE DOWLING  MD        Assessment and Plan     Cindy was seen today for back pain.    Diagnoses and all orders for this visit:    Lumbar radiculopathy  No red flags on exam today. We discussed today that she has known degenerative disc disease (MRI from 2012) and that if her osteoporosis is worsening this could contibute to worsening pain.   Advised continued NSAID use prn.  Add topical pain relief.   XR to rule out compression fracture.   Offered PT, she declined.   OMT next visit if desired - instructed to schedule with a DO.     -     X-ray lumbar spine 2-3 views*; Future  -     Discontinue: lidocaine (LIDODERM) 5 % Patch; Apply up to 3 patches to painful area at once for up to 12 h within a 24 h period.  Remove after 12 hours.  -      : Sign Language or Oral - 68-82 minutes    Hyperparathyroidism (H)  Referral placed to endocrinology (last seen 2015, their visit recommended annual evaluation) due to her osteoporosis in the setting of known hyperparathyroidism. Repeat DEXA as well.  Patient tells me she was taking alendronate up until a few months ago (year 4 of 5 of treatment), encouraged her to restart. No other side effects reported. She is taking calcium/vitamin D supplementation as well.  Last calcium done  2 months ago was mildly elevated, but stable compared with prior years. iPTH remains elevated (although improved 97<<122 in 2015)    -     DX Hip/Pelvis/Spine; Future  -     ENDOCRINOLOGY ADULT REFERRAL - INTERNAL    Other osteoporosis, unspecified pathological fracture presence  -     X-ray lumbar spine 2-3 views*; Future  -     DX Hip/Pelvis/Spine; Future  -     ENDOCRINOLOGY ADULT REFERRAL - INTERNAL    Health maintenance alteration  -     omega 3 1000 MG CAPS; Take 1 g by mouth daily    She will return in 1 week to review XR results and for OMT.     There are no discontinued medications.  Options for treatment and follow-up care were reviewed with the patient. Cindy Olga  engaged in the decision  making process and verbalized understanding of the options discussed and agreed with the final plan.    Donna Pryor, DO

## 2018-06-27 NOTE — NURSING NOTE
Due to patient being non-English speaking/uses sign language, an  was used for this visit. Only for face-to-face interpretation by an external agency, date and length of interpretation can be found on the scanned worksheet.     name: amy  Agency: [employer]  Language: Jamaican   Telephone number: [phone]  Type of interpretation: Face-to-face, spoken   ANYA Chatman

## 2018-06-28 RX ORDER — CAPSAICIN 0.025 %
1 CREAM (GRAM) TOPICAL 3 TIMES DAILY PRN
Qty: 45 G | Refills: 3 | Status: SHIPPED | OUTPATIENT
Start: 2018-06-28 | End: 2018-07-13

## 2018-06-28 RX ORDER — LIDOCAINE 50 MG/G
OINTMENT TOPICAL 3 TIMES DAILY PRN
Qty: 50 G | Refills: 1 | Status: SHIPPED | OUTPATIENT
Start: 2018-06-28 | End: 2018-06-28

## 2018-06-28 NOTE — TELEPHONE ENCOUNTER
Message routed to Dr. Pryor to discontinue order as pharmacy states not covered by insurance (original message stated it would be covered) and send Capsasin 0.025% cream.    Tabitha Andrade RN

## 2018-06-28 NOTE — TELEPHONE ENCOUNTER
Rx for capsaicin prescribed. Asked pharmacy to explain how to use for patient, otherwise she can call here to discuss or return for appointment.     Donna Pryor, DO

## 2018-06-28 NOTE — TELEPHONE ENCOUNTER
Manjit called from [facility].  The ointment is not covered that was sent to the pharmacy.  Please change RX to Capsasin 0.025% cream.  This will be covered by insurance.

## 2018-07-02 ENCOUNTER — OFFICE VISIT (OUTPATIENT)
Dept: FAMILY MEDICINE | Facility: CLINIC | Age: 58
End: 2018-07-02
Payer: COMMERCIAL

## 2018-07-02 VITALS
BODY MASS INDEX: 28.79 KG/M2 | HEART RATE: 89 BPM | RESPIRATION RATE: 16 BRPM | DIASTOLIC BLOOD PRESSURE: 80 MMHG | SYSTOLIC BLOOD PRESSURE: 121 MMHG | WEIGHT: 157.4 LBS | OXYGEN SATURATION: 98 % | TEMPERATURE: 97.9 F

## 2018-07-02 DIAGNOSIS — R10.9 RIGHT FLANK PAIN: ICD-10-CM

## 2018-07-02 DIAGNOSIS — K59.00 CONSTIPATION, UNSPECIFIED CONSTIPATION TYPE: ICD-10-CM

## 2018-07-02 DIAGNOSIS — M54.16 LUMBAR BACK PAIN WITH RADICULOPATHY AFFECTING RIGHT LOWER EXTREMITY: Primary | ICD-10-CM

## 2018-07-02 LAB
BILIRUBIN UR: NEGATIVE
BLOOD UR: NEGATIVE
GLUCOSE URINE: NEGATIVE
KETONES UR QL: NEGATIVE
LEUKOCYTE ESTERASE UR: NEGATIVE
NITRITE UR QL STRIP: NEGATIVE
PH UR STRIP: 6.5 [PH] (ref 5–7)
PROTEIN UR: NEGATIVE
SP GR UR STRIP: 1.01
UROBILINOGEN UR STRIP-ACNC: NORMAL

## 2018-07-02 RX ORDER — IBUPROFEN 600 MG/1
600 TABLET, FILM COATED ORAL EVERY 6 HOURS PRN
Qty: 90 TABLET | Refills: 1 | Status: SHIPPED | OUTPATIENT
Start: 2018-07-02 | End: 2019-10-23

## 2018-07-02 RX ORDER — AMOXICILLIN 250 MG
2 CAPSULE ORAL 2 TIMES DAILY
Qty: 60 TABLET | Refills: 1 | Status: SHIPPED | OUTPATIENT
Start: 2018-07-02 | End: 2018-09-19

## 2018-07-02 RX ORDER — ACETAMINOPHEN 500 MG
500 TABLET ORAL EVERY 6 HOURS PRN
Qty: 100 TABLET | Refills: 11 | Status: SHIPPED | OUTPATIENT
Start: 2018-07-02 | End: 2018-09-10

## 2018-07-02 NOTE — NURSING NOTE
Due to patient being non-English speaking/uses sign language, an  was used for this visit. Only for face-to-face interpretation by an external agency, date and length of interpretation can be found on the scanned worksheet.     name: Paradise Olmstead  Agency: [employer]  Language: South Korean   Telephone number: [phone]  Type of interpretation: Face-to-face, spoken    Stephanie Quintanilla CMA

## 2018-07-02 NOTE — MR AVS SNAPSHOT
After Visit Summary   7/2/2018    Cindy Espinoza    MRN: [id number]           Patient Information     Date Of Birth          1960        Visit Information        Provider Department      7/2/2018 4:20 PM Kentrell Prasad MD [facility]        Today's Diagnoses     Lumbar back pain with radiculopathy affecting right lower extremity    -  1    Right flank pain        Constipation, unspecified constipation type           Follow-ups after your visit        Your next 10 appointments already scheduled     Jul 13, 2018  3:40 PM CDT   Return Visit with DO [facility] ([facility])    [address],  [address]  [facility] [address]   [phone]            Sep 07, 2018  2:00 PM CDT   (Arrive by 1:45 PM)   NEW ENDOCRINE with Noah Hoskins MD   [facility] ([facility])    [address]  3rd Floor  [facility] [id number] [phone]              Who to contact     Please call your clinic at [phone] to:    Ask questions about your health    Make or cancel appointments    Discuss your medicines    Learn about your test results    Speak to your doctor            Additional Information About Your Visit        Care EveryWhere ID     This is your Care EveryWhere ID. This could be used by other organizations to access your [place] medical records  [id number]        Your Vitals Were     Pulse Temperature Respirations Pulse Oximetry BMI (Body Mass Index)       89 97.9  F (36.6  C) (Oral) 16 98% 28.79 kg/m2        Blood Pressure from Last 3 Encounters:   07/02/18 121/80   06/27/18 117/80   04/11/18 127/87    Weight from Last 3 Encounters:   07/02/18 157 lb 6.4 oz (71.4 kg)   06/27/18 157 lb 12.8 oz (71.6 kg)   04/11/18 154 lb 11.2 oz (70.2 kg)              We Performed the Following      : Sign Language or Oral - 53-67 minutes     Urinalysis, Micro If (UA) (Syed)           Today's Medication Changes          These changes are accurate as of 7/2/18 11:59 PM.  If you have any questions, ask your nurse or doctor.               Start taking these medicines.        Dose/Directions    senna-docusate 8.6-50 MG per tablet   Commonly known as:  SENOKOT-S;PERICOLACE   Used for:  Constipation, unspecified constipation type   Started by:  Kentrell Prasad MD        Dose:  2 tablet   Take 2 tablets by mouth 2 times daily   Quantity:  60 tablet   Refills:  1         These medicines have changed or have updated prescriptions.        Dose/Directions    * acetaminophen 325 MG tablet   Commonly known as:  TYLENOL   This may have changed:  Another medication with the same name was added. Make sure you understand how and when to take each.   Used for:  Bilateral low back pain with left-sided sciatica   Changed by:  Kentrell Prasad MD        Dose:  650 mg   Take 2 tablets (650 mg) by mouth every 6 hours as needed for mild pain Do not exceed 3 g acetaminophen from all sources within 24 hours   Quantity:  100 tablet   Refills:  0       * acetaminophen 500 MG tablet   Commonly known as:  TYLENOL   This may have changed:  You were already taking a medication with the same name, and this prescription was added. Make sure you understand how and when to take each.   Used for:  Lumbar back pain with radiculopathy affecting right lower extremity   Changed by:  Kentrell Prasad MD        Dose:  500 mg   Take 1 tablet (500 mg) by mouth every 6 hours as needed for mild pain   Quantity:  100 tablet   Refills:  11       * ibuprofen 600 MG tablet   Commonly known as:  ADVIL/MOTRIN   This may have changed:  Another medication with the same name was added. Make sure you understand how and when to take each.   Used for:  Chronic low back pain with sciatica, sciatica laterality unspecified, unspecified back pain laterality   Changed by:  Kentrell Prasad MD        Dose:  600 mg   Take 1 tablet (600 mg) by mouth every 6 hours  as needed for moderate pain   Quantity:  30 tablet   Refills:  1       * ibuprofen 600 MG tablet   Commonly known as:  ADVIL/MOTRIN   This may have changed:  You were already taking a medication with the same name, and this prescription was added. Make sure you understand how and when to take each.   Used for:  Lumbar back pain with radiculopathy affecting right lower extremity   Changed by:  Kentrell Prasad MD        Dose:  600 mg   Take 1 tablet (600 mg) by mouth every 6 hours as needed for moderate pain   Quantity:  90 tablet   Refills:  1       * Notice:  This list has 4 medication(s) that are the same as other medications prescribed for you. Read the directions carefully, and ask your doctor or other care provider to review them with you.         Where to get your medicines      These medications were sent to [facility] - [address] [address]     Phone:  [phone]     acetaminophen 500 MG tablet    ibuprofen 600 MG tablet    senna-docusate 8.6-50 MG per tablet                Primary Care Provider Office Phone # Fax #    Walter Kulkarni [phone]       [facility] [address]   [facility] [address]        Equal Access to Services     SIERRA BELTRE AH: Hadii maryan miranda hadasho Sosherrie, waaxda luqadaha, qaybta kaalmada adeegyada, cooper lind. So [facility] [phone].    ATENCIÓN: Si habla español, tiene a lopez disposición servicios gratuitos de asistencia lingüística. Llame al [phone].    We comply with applicable federal civil rights laws and [place] laws. We do not discriminate on the basis of race, color, national origin, age, disability, sex, sexual orientation, or gender identity.            Thank you!     Thank you for choosing [facility]  for your care. Our goal is always to provide you with excellent care. Hearing back from our patients is one way we  can continue to improve our services. Please take a few minutes to complete the written survey that you may receive in the mail after your visit with us. Thank you!             Your Updated Medication List - Protect others around you: Learn how to safely use, store and throw away your medicines at www.disposemymeds.org.          This list is accurate as of 7/2/18 11:59 PM.  Always use your most recent med list.                   Brand Name Dispense Instructions for use Diagnosis    * acetaminophen 325 MG tablet    TYLENOL    100 tablet    Take 2 tablets (650 mg) by mouth every 6 hours as needed for mild pain Do not exceed 3 g acetaminophen from all sources within 24 hours    Bilateral low back pain with left-sided sciatica       * acetaminophen 500 MG tablet    TYLENOL    100 tablet    Take 1 tablet (500 mg) by mouth every 6 hours as needed for mild pain    Lumbar back pain with radiculopathy affecting right lower extremity       alendronate 70 MG tablet    FOSAMAX    30 tablet    Take 1 tablet (70 mg) by mouth every 7 days Take with over 8 ounces water & stay upright for at least 30 minutes after dose.Take at least 60 minutes before breakfast    Age-related osteoporosis without current pathological fracture       Calcium Carb-Cholecalciferol 600-800 MG-UNIT Tabs     180 tablet    Take 600-800 mg by mouth 2 times daily    Age-related osteoporosis without current pathological fracture       capsaicin 0.025 % Crea cream    ZOSTRIX    45 g    Apply 1 g topically 3 times daily as needed Wash hands with soap and water after applications.    Chronic bilateral low back pain with right-sided sciatica       * ibuprofen 600 MG tablet    ADVIL/MOTRIN    30 tablet    Take 1 tablet (600 mg) by mouth every 6 hours as needed for moderate pain    Chronic low back pain with sciatica, sciatica laterality unspecified, unspecified back pain laterality       * ibuprofen 600 MG tablet    ADVIL/MOTRIN    90 tablet    Take 1 tablet (600  mg) by mouth every 6 hours as needed for moderate pain    Lumbar back pain with radiculopathy affecting right lower extremity       multivitamin, therapeutic with minerals Tabs tablet     100 tablet    Take 1 tablet by mouth daily    Fatigue, unspecified type       omega 3 1000 MG Caps     90 capsule    Take 1 g by mouth daily    Health maintenance alteration       polyethylene glycol powder    MIRALAX    510 g    Take 17 g (1 capful) by mouth daily    Slow transit constipation       senna-docusate 8.6-50 MG per tablet    SENOKOT-S;PERICOLACE    60 tablet    Take 2 tablets by mouth 2 times daily    Constipation, unspecified constipation type       vitamin D 2000 units tablet     100 tablet    Take 2,000 Units by mouth daily    Vitamin D deficiency       * Notice:  This list has 4 medication(s) that are the same as other medications prescribed for you. Read the directions carefully, and ask your doctor or other care provider to review them with you.

## 2018-07-02 NOTE — LETTER
July 8, 2018      Cindy Espinoza  [address]  [facility] [address]        Dear Cindy,    Thank you for getting your care at [facility]. Please see below for your test results.    Resulted Orders   Urinalysis, Micro If (UA) ([facility] [place])   Result Value Ref Range    Specific Gravity Urine 1.015 1.005 - 1.030    pH Urine 6.5 4.5 - 8.0    Leukocyte Esterase UR Negative NEGATIVE    Nitrite Urine Negative NEGATIVE    Protein UR Negative NEGATIVE    Glucose Urine Negative NEGATIVE    Ketones Urine Negative NEGATIVE    Urobilinogen mg/dL 0.2 E.U./dL 0.2 E.U./dL    Bilirubin UR Negative NEGATIVE    Blood UR Negative NEGATIVE     Here are you urine results form last week.  No sign of infection.  If your back pain or other symptoms are not resolving within the next couple of weeks then please return for a recheck.  Please see the Endocrinologist about your Calcium.    If you have any concerns about these results please call and leave a message for me or send a Webspyt message to the clinic.    Sincerely,    Kentrell Prasad MD

## 2018-07-08 NOTE — PROGRESS NOTES
SUBJECTIVE:   Cindy Espinoza is a 58 year old female who presents to clinic today for the following health issues:  1. Back pain--worse over last four days.  In low back and hip area.  Radiates down leg on right beyond the knee. No weight loss or night sweats.  Worse with movement.  Has had back issues in the past.  Recent negative back x-ray without fracture.  Did have some disk space loss.  No weakness.  No new urinary or bowel issues.  2. Flank pain/urine symptoms--mild.  No urgency.  No concern for STI.  Has had some concern for hyperparathyroidism in past but looks like Vitamin D was low at that time.  Also has noted increased constipation lately.        Problem list and histories reviewed & adjusted, as indicated.  Additional history: as documented    Patient Active Problem List   Diagnosis     Hypercalcemia     Menopause present     Fibromyalgia     Chronic tension headaches     Osteoporosis     TMJ (temporomandibular joint syndrome)     Abnormal Pap smear of cervix     Caregiver burden     Low back pain     Hyperparathyroidism (H)     Past Surgical History:   Procedure Laterality Date     NO HISTORY OF SURGERY         Social History   Substance Use Topics     Smoking status: Never Smoker     Smokeless tobacco: Never Used     Alcohol use No     Family History   Problem Relation Age of Onset     Other - See Comments Son      xeroderma pigmentosum         Current Outpatient Prescriptions   Medication Sig Dispense Refill     acetaminophen (TYLENOL) 325 MG tablet Take 2 tablets (650 mg) by mouth every 6 hours as needed for mild pain Do not exceed 3 g acetaminophen from all sources within 24 hours 100 tablet 0     acetaminophen (TYLENOL) 500 MG tablet Take 1 tablet (500 mg) by mouth every 6 hours as needed for mild pain 100 tablet 11     alendronate (FOSAMAX) 70 MG tablet Take 1 tablet (70 mg) by mouth every 7 days Take with over 8 ounces water & stay upright for at least 30 minutes after dose.Take at least 60  minutes before breakfast 30 tablet 3     Calcium Carb-Cholecalciferol 600-800 MG-UNIT TABS Take 600-800 mg by mouth 2 times daily 180 tablet 3     capsaicin (ZOSTRIX) 0.025 % CREA cream Apply 1 g topically 3 times daily as needed Wash hands with soap and water after applications. 45 g 3     Cholecalciferol (VITAMIN D) 2000 units tablet Take 2,000 Units by mouth daily 100 tablet 3     ibuprofen (ADVIL/MOTRIN) 600 MG tablet Take 1 tablet (600 mg) by mouth every 6 hours as needed for moderate pain 90 tablet 1     ibuprofen (ADVIL/MOTRIN) 600 MG tablet Take 1 tablet (600 mg) by mouth every 6 hours as needed for moderate pain 30 tablet 1     multivitamin, therapeutic with minerals (MULTI-VITAMIN) TABS tablet Take 1 tablet by mouth daily 100 tablet 3     omega 3 1000 MG CAPS Take 1 g by mouth daily 90 capsule      polyethylene glycol (MIRALAX) powder Take 17 g (1 capful) by mouth daily 510 g 1     senna-docusate (SENOKOT-S;PERICOLACE) 8.6-50 MG per tablet Take 2 tablets by mouth 2 times daily 60 tablet 1     Allergies   Allergen Reactions     Iodine Rash       Reviewed and updated as needed this visit by clinical staff  Tobacco  Allergies  Meds  Med Hx  Surg Hx  Fam Hx  Soc Hx      Reviewed and updated as needed this visit by Provider         ROS:  Constitutional, HEENT, cardiovascular, pulmonary, gi and gu systems are negative, except as otherwise noted.    OBJECTIVE:     /80  Pulse 89  Temp 97.9  F (36.6  C) (Oral)  Resp 16  Wt 157 lb 6.4 oz (71.4 kg)  SpO2 98%  BMI 28.79 kg/m2  Body mass index is 28.79 kg/(m^2).  GENERAL: healthy, alert and no distress  RESP: lungs clear to auscultation - no rales, rhonchi or wheezes  CV: regular rate and rhythm, normal S1 S2, no S3 or S4, no murmur, click or rub, no peripheral edema and peripheral pulses strong  ABDOMEN: soft, nontender, no hepatosplenomegaly, no masses and bowel sounds normal  MS: no gross musculoskeletal defects noted, no edema  NEURO: Normal  strength and tone, mentation intact and speech normal  BACK: no CVA tenderness, some paravertebral/ paralumbar tenderness R>L    Diagnostic Test Results:  Results for orders placed or performed in visit on 07/02/18   Urinalysis, Micro If (UA) ([facility])   Result Value Ref Range    Specific Gravity Urine 1.015 1.005 - 1.030    pH Urine 6.5 4.5 - 8.0    Leukocyte Esterase UR Negative NEGATIVE    Nitrite Urine Negative NEGATIVE    Protein UR Negative NEGATIVE    Glucose Urine Negative NEGATIVE    Ketones Urine Negative NEGATIVE    Urobilinogen mg/dL 0.2 E.U./dL 0.2 E.U./dL    Bilirubin UR Negative NEGATIVE    Blood UR Negative NEGATIVE       ASSESSMENT/PLAN:         ICD-10-CM    1. Lumbar back pain with radiculopathy affecting right lower extremity M54.17 ibuprofen (ADVIL/MOTRIN) 600 MG tablet     acetaminophen (TYLENOL) 500 MG tablet   2. Right flank pain R10.9 Urinalysis, Micro If (UA) (Syed)      : Sign Language or Oral - 53-67 minutes   3. Constipation, unspecified constipation type K59.00 senna-docusate (SENOKOT-S;PERICOLACE) 8.6-50 MG per tablet     Will try conservative therapy for now.  No warning symptoms with back and urine.  If worsening, will do more w/u with sports med/additional imaging.  P.T recommended.      >25 minutes spent with pt with >50% in counseling and coordination of care.  Does need f/u for possible parathyroidism--encouraged her to do this.  We should also make sure to repeat Vitamin D and calcium level.  MD [facility]

## 2018-07-13 ENCOUNTER — OFFICE VISIT (OUTPATIENT)
Dept: FAMILY MEDICINE | Facility: CLINIC | Age: 58
End: 2018-07-13
Payer: COMMERCIAL

## 2018-07-13 VITALS
RESPIRATION RATE: 18 BRPM | OXYGEN SATURATION: 98 % | BODY MASS INDEX: 28.83 KG/M2 | DIASTOLIC BLOOD PRESSURE: 83 MMHG | TEMPERATURE: 97.8 F | WEIGHT: 157.6 LBS | HEART RATE: 80 BPM | SYSTOLIC BLOOD PRESSURE: 119 MMHG

## 2018-07-13 DIAGNOSIS — M99.9 NONALLOPATHIC LESION OF LUMBAR REGION: ICD-10-CM

## 2018-07-13 DIAGNOSIS — M99.9 NONALLOPATHIC LESION OF CERVICAL REGION: ICD-10-CM

## 2018-07-13 DIAGNOSIS — M99.9 NONALLOPATHIC LESION OF THORACIC REGION: ICD-10-CM

## 2018-07-13 DIAGNOSIS — M54.41 CHRONIC BILATERAL LOW BACK PAIN WITH RIGHT-SIDED SCIATICA: Primary | ICD-10-CM

## 2018-07-13 DIAGNOSIS — M99.05 SOMATIC DYSFUNCTION OF PELVIC REGION: ICD-10-CM

## 2018-07-13 DIAGNOSIS — G89.29 CHRONIC BILATERAL LOW BACK PAIN WITH RIGHT-SIDED SCIATICA: Primary | ICD-10-CM

## 2018-07-13 DIAGNOSIS — M99.9 NONALLOPATHIC LESION OF SACRAL REGION: ICD-10-CM

## 2018-07-13 DIAGNOSIS — Z00.00 ROUTINE CHECK-UP: ICD-10-CM

## 2018-07-13 RX ORDER — CAPSAICIN 0.025 %
1 CREAM (GRAM) TOPICAL 3 TIMES DAILY PRN
Qty: 45 G | Refills: 3 | Status: SHIPPED | OUTPATIENT
Start: 2018-07-13 | End: 2019-10-23

## 2018-07-13 NOTE — PROGRESS NOTES
HPI       Cindy Espinoza is a 58 year old  who presents for   Chief Complaint   Patient presents with     RECHECK     Back pain and need Meningitis immunization   She would like to try OMT today for her pain.     Back Pain Follow Up    Description:   Location of pain:  Lower back ,  right  Character of pain: dull ache and constant  Pain radiation: radiates into the right buttocks  Since last visit, pain is:  unchanged  New numbness or weakness in legs, not attributed to pain:  no  What therapies have you tried? Taking pain medication  What has worked well? Yes but it comes back     Intensity: Currently 5/10    History:   Pain interferes with job: no  Therapies tried without relief: None  Therapies tried with relief: NSAIDs       A [employer]  was used for  this visit.    +++++++      Problem, Medication and Allergy Lists were reviewed and updated if needed..    Patient is an established patient of this clinic..         Review of Systems:   Review of Systems  As per HPI       Physical Exam:     Vitals:    07/13/18 1551   BP: 119/83   BP Location: Left arm   Patient Position: Chair   Cuff Size: Adult Regular   Pulse: 80   Resp: 18   Temp: 97.8  F (36.6  C)   TempSrc: Oral   SpO2: 98%   Weight: 157 lb 9.6 oz (71.5 kg)     Body mass index is 28.83 kg/(m^2).  Vitals were reviewed and were normal     Physical Exam   Constitutional: She is oriented to person, place, and time. She appears well-developed.   HENT:   Head: Normocephalic and atraumatic.   Eyes: Conjunctivae are normal.   Neck: Normal range of motion. Neck supple.   Pulmonary/Chest: Effort normal.   Musculoskeletal:   See OMT note for details MSK exam     Neurological: She is alert and oriented to person, place, and time.   Skin: Skin is warm and dry.   Psychiatric: She has a normal mood and affect. Her behavior is normal. Judgment and thought content normal.         Results:   Results from last visit:  Office Visit on 07/02/2018   Component Date  Value Ref Range Status     Specific Gravity Urine 07/02/2018 1.015  1.005 - 1.030 Final     pH Urine 07/02/2018 6.5  4.5 - 8.0 Final     Leukocyte Esterase UR 07/02/2018 Negative  NEGATIVE Final     Nitrite Urine 07/02/2018 Negative  NEGATIVE Final     Protein UR 07/02/2018 Negative  NEGATIVE Final     Glucose Urine 07/02/2018 Negative  NEGATIVE Final     Ketones Urine 07/02/2018 Negative  NEGATIVE Final     Urobilinogen mg/dL 07/02/2018 0.2 E.U./dL  0.2 E.U./dL Final     Bilirubin UR 07/02/2018 Negative  NEGATIVE Final     Blood UR 07/02/2018 Negative  NEGATIVE Final     Assessment and Plan        Cindy was seen today for recheck.    Diagnoses and all orders for this visit:    Chronic bilateral low back pain with right-sided sciatica  -     capsaicin (ZOSTRIX) 0.025 % CREA cream; Apply 1 g topically 3 times daily as needed Wash hands with soap and water after applications.    Routine check-up  -     ADMIN VACCINE, INITIAL  -     MENINGOCOCCAL VACCINE,IM (Mentactra )    Nonallopathic lesion of cervical region  -     OSTEOPATHIC MANIP,5-6 BODY REGN    Nonallopathic lesion of thoracic region  -     OSTEOPATHIC MANIP,5-6 BODY REGN    Nonallopathic lesion of lumbar region  -     OSTEOPATHIC MANIP,5-6 BODY REGN    Nonallopathic lesion of sacral region  -     OSTEOPATHIC MANIP,5-6 BODY REGN    Somatic dysfunction of pelvic region  -     OSTEOPATHIC MANIP,5-6 BODY REGN      OMT PROCEDURE NOTE    Body Region: Cervical    Somatic Dysfunction #1: cervical hypertonicity, suboccipital hypertonicity  Treatment: muscle energy: direct, Myofascial: direct and Soft Tissue   Outcome: Improved    Body Region: Thoracic    Somatic Dysfunction #1: thoracic hypertonicity  Treatment: muscle energy: direct, Myofascial: direct and Soft Tissue   Outcome: Improved    Body Region: Lumbar    Somatic Dysfunction #1: lumbar hypertonicity  Treatment: muscle energy: direct, Myofascial: direct and Soft Tissue   Outcome: Improved      Body Region:  Pelvis    Somatic Dysfunction #1: R posterior innominate  Treatment: muscle energy: direct   Outcome: Improved    Somatic Dysfunction #2: R piriformis restriction  Treatment: muscle energy: direct   Outcome: Improved    Body Region: Sacrum    Somatic Dysfunction #1: Restricted sacral motion  Treatment: Myofascial: direct and counterstrain   Outcome: Improved     There are no discontinued medications.    Options for treatment and follow-up care were reviewed with the patient. Cindy Espinoza  engaged in the decision making process and verbalized understanding of the options discussed and agreed with the final plan.    Donna Pryor DO

## 2018-07-13 NOTE — MR AVS SNAPSHOT
After Visit Summary   7/13/2018    Cindy Espinoza    MRN: [id number]           Patient Information     Date Of Birth          1960        Visit Information        Provider Department      7/13/2018 3:40 PM Donna Pryor DO [facility]        Today's Diagnoses     Chronic bilateral low back pain with right-sided sciatica    -  1    Routine check-up        Nonallopathic lesion of cervical region        Nonallopathic lesion of thoracic region        Nonallopathic lesion of lumbar region        Nonallopathic lesion of sacral region        Somatic dysfunction of pelvic region           Follow-ups after your visit        Your next 10 appointments already scheduled     Sep 07, 2018  2:00 PM CDT   (Arrive by 1:45 PM)   NEW ENDOCRINE with Noah Hoskins MD   [facility] Endocrinology ([facility] [place])    [address] [place] [id number] [phone]              Who to contact     Please call your clinic at [phone] to:    Ask questions about your health    Make or cancel appointments    Discuss your medicines    Learn about your test results    Speak to your doctor            Additional Information About Your Visit        Care EveryWhere ID     This is your Care EveryWhere ID. This could be used by other organizations to access your [place] medical records  [id number]        Your Vitals Were     Pulse Temperature Respirations Pulse Oximetry Breastfeeding? BMI (Body Mass Index)    80 97.8  F (36.6  C) (Oral) 18 98% No 28.83 kg/m2       Blood Pressure from Last 3 Encounters:   07/13/18 119/83   07/02/18 121/80   06/27/18 117/80    Weight from Last 3 Encounters:   07/13/18 157 lb 9.6 oz (71.5 kg)   07/02/18 157 lb 6.4 oz (71.4 kg)   06/27/18 157 lb 12.8 oz (71.6 kg)              We Performed the Following     ADMIN VACCINE, INITIAL     MENINGOCOCCAL VACCINE,IM (Mentactra )     OSTEOPATHIC MANIP,5-6 BODY REGN          Where to get your  medicines      These medications were sent to [facility] - [place], [place] - [address]  [address], [facility] [address]     Phone:  [phone]     capsaicin 0.025 % Crea cream          Primary Care Provider Office Phone # Fax #    Walter Kulkarni [phone]       [facility][note 3] [address]   [facility] [address]        Equal Access to Services     GYPSY BELTRE : Hadii aad ku hadasho Soomaali, waaxda luqadaha, qaybta kaalmada adeegyada, waxay idiin hayaan adeeg kharash la'ricon ah. So [facility] [phone].    ATENCIÓN: Si yokastala espcarol, tiene a lopez disposición servicios gratuitos de asistencia lingüística. Cl al [phone].    We comply with applicable federal civil rights laws and [place] laws. We do not discriminate on the basis of race, color, national origin, age, disability, sex, sexual orientation, or gender identity.            Thank you!     Thank you for choosing [facility]  for your care. Our goal is always to provide you with excellent care. Hearing back from our patients is one way we can continue to improve our services. Please take a few minutes to complete the written survey that you may receive in the mail after your visit with us. Thank you!             Your Updated Medication List - Protect others around you: Learn how to safely use, store and throw away your medicines at www.disposemymeds.org.          This list is accurate as of 7/13/18 11:59 PM.  Always use your most recent med list.                   Brand Name Dispense Instructions for use Diagnosis    * acetaminophen 325 MG tablet    TYLENOL    100 tablet    Take 2 tablets (650 mg) by mouth every 6 hours as needed for mild pain Do not exceed 3 g acetaminophen from all sources within 24 hours    Bilateral low back pain with left-sided sciatica       * acetaminophen 500 MG tablet    TYLENOL    100 tablet    Take 1 tablet (500 mg) by mouth every 6 hours as  needed for mild pain    Lumbar back pain with radiculopathy affecting right lower extremity       alendronate 70 MG tablet    FOSAMAX    30 tablet    Take 1 tablet (70 mg) by mouth every 7 days Take with over 8 ounces water & stay upright for at least 30 minutes after dose.Take at least 60 minutes before breakfast    Age-related osteoporosis without current pathological fracture       Calcium Carb-Cholecalciferol 600-800 MG-UNIT Tabs     180 tablet    Take 600-800 mg by mouth 2 times daily    Age-related osteoporosis without current pathological fracture       capsaicin 0.025 % Crea cream    ZOSTRIX    45 g    Apply 1 g topically 3 times daily as needed Wash hands with soap and water after applications.    Chronic bilateral low back pain with right-sided sciatica       * ibuprofen 600 MG tablet    ADVIL/MOTRIN    30 tablet    Take 1 tablet (600 mg) by mouth every 6 hours as needed for moderate pain    Chronic low back pain with sciatica, sciatica laterality unspecified, unspecified back pain laterality       * ibuprofen 600 MG tablet    ADVIL/MOTRIN    90 tablet    Take 1 tablet (600 mg) by mouth every 6 hours as needed for moderate pain    Lumbar back pain with radiculopathy affecting right lower extremity       multivitamin, therapeutic with minerals Tabs tablet     100 tablet    Take 1 tablet by mouth daily    Fatigue, unspecified type       omega 3 1000 MG Caps     90 capsule    Take 1 g by mouth daily    Health maintenance alteration       polyethylene glycol powder    MIRALAX    510 g    Take 17 g (1 capful) by mouth daily    Slow transit constipation       senna-docusate 8.6-50 MG per tablet    SENOKOT-S;PERICOLACE    60 tablet    Take 2 tablets by mouth 2 times daily    Constipation, unspecified constipation type       vitamin D 2000 units tablet     100 tablet    Take 2,000 Units by mouth daily    Vitamin D deficiency       * Notice:  This list has 4 medication(s) that are the same as other medications  prescribed for you. Read the directions carefully, and ask your doctor or other care provider to review them with you.

## 2018-09-07 ENCOUNTER — OFFICE VISIT (OUTPATIENT)
Dept: ENDOCRINOLOGY | Facility: CLINIC | Age: 58
End: 2018-09-07
Attending: FAMILY MEDICINE
Payer: COMMERCIAL

## 2018-09-07 VITALS
HEIGHT: 63 IN | DIASTOLIC BLOOD PRESSURE: 74 MMHG | SYSTOLIC BLOOD PRESSURE: 110 MMHG | BODY MASS INDEX: 28.51 KG/M2 | WEIGHT: 160.9 LBS | HEART RATE: 66 BPM

## 2018-09-07 DIAGNOSIS — M81.0 AGE-RELATED OSTEOPOROSIS WITHOUT CURRENT PATHOLOGICAL FRACTURE: ICD-10-CM

## 2018-09-07 DIAGNOSIS — E21.0 PRIMARY HYPERPARATHYROIDISM (H): Primary | ICD-10-CM

## 2018-09-07 DIAGNOSIS — E21.0 PRIMARY HYPERPARATHYROIDISM (H): ICD-10-CM

## 2018-09-07 LAB
ANION GAP SERPL CALCULATED.3IONS-SCNC: 6 MMOL/L (ref 3–14)
BUN SERPL-MCNC: 22 MG/DL (ref 7–30)
CALCIUM SERPL-MCNC: 9.5 MG/DL (ref 8.5–10.1)
CHLORIDE SERPL-SCNC: 106 MMOL/L (ref 94–109)
CO2 SERPL-SCNC: 26 MMOL/L (ref 20–32)
CREAT SERPL-MCNC: 0.72 MG/DL (ref 0.52–1.04)
GFR SERPL CREATININE-BSD FRML MDRD: 83 ML/MIN/1.7M2
GLUCOSE SERPL-MCNC: 95 MG/DL (ref 70–99)
PHOSPHATE SERPL-MCNC: 2.7 MG/DL (ref 2.5–4.5)
POTASSIUM SERPL-SCNC: 3.8 MMOL/L (ref 3.4–5.3)
PTH-INTACT SERPL-MCNC: 108 PG/ML (ref 18–80)
SODIUM SERPL-SCNC: 138 MMOL/L (ref 133–144)

## 2018-09-07 PROCEDURE — 83970 ASSAY OF PARATHORMONE: CPT | Performed by: INTERNAL MEDICINE

## 2018-09-07 PROCEDURE — 82306 VITAMIN D 25 HYDROXY: CPT | Performed by: INTERNAL MEDICINE

## 2018-09-07 ASSESSMENT — PAIN SCALES - GENERAL: PAINLEVEL: NO PAIN (0)

## 2018-09-07 NOTE — PATIENT INSTRUCTIONS
1- Lab today.  2- schedule NM parathyroid scan and DEXA scans  3- schedule referral to Dr. Rey Hoskins MD  Endocrinology, Diabetes and Metabolism  [facility]

## 2018-09-07 NOTE — LETTER
9/7/2018       RE: Cindy Espinoza  [address]  [facility] [address]     Dear Colleague,    Thank you for referring your patient, Cindy Espinoza, to the [facility] ENDOCRINOLOGY at [facility]. Please see a copy of my visit note below.    Endocrinology Clinic Visit 9/7/2018    NAME:  Cindy Espinoza  PCP:  Walter Kulkarni  MRN:  [id number]  Reason for Consult:  hyperparathyroidism  Requesting Provider:  Donna Pryor    Chief Complaint     Chief Complaint   Patient presents with     RECHECK     HYERPARATHYROID       History of Present Illness     Cindy Espinoza is a 58 year old female who is seen in clinic for management of hyperparathyroidism.     History obtained with help of .     In reviewing her chart, she has had hypercalcemia since as far back as 2012. PTH also checked since 2012 has been elevated. Ranging . In 2012, she had a 24 hr urine calcium that was 180 mg/24 hrs. Serum calcium then was 10.6, .  She saw Dr. Aguilar (Endocrine, [facility]) in 2012 and was diagnosed with primary hyperparathyroidism, and recommended parathyroidectomy due to osteoporosis. DXA scan then showed lowest T-score of -3.0 in the lumbar spine. Head and neck US then did not seem to localize any parathyroid adenoma. ptient was lost to f/up until 2015, when she saw Dr. Trent Bonilla (Endocrine). She was advised observation. Failed to f/up.   Last DXA scan 5/8/2015 showed: osteoporosis, with lowest T-score of -3.3 in the L1-L4 spine. -4% decrease in spine BMD since 2012.     Today she has been re-referred to us for the same issue.   Last set of labs: Her serum calcium level was elevated at 10.8 mg/dL in April 2018. PT was 97, vitamin D 20 ng/mL.   1,25 dihydroxy-D: 50.8 pg/mL.     Symptoms of hypercalcemia: no constipation, no dyspepsia, no mental status changes/lethargy, no polyuria.    Calcium/D intake: 1 tab Calcium 600+D 800 once a day, 1 tab of vitamin D 2000  international units daily.     Dietary: 1-2 cups of milk a day.     She has been prescribed Fosamax for many years. However, she adits to not remembering to take it. She takes the first tablet of the month, then forgets the rest of month. Then she gets called by the pharmacy for a refill, and takes one tab and forgets the rest of the month. She has not taken any Fosamax for the past 7 months.     - Previous fractures: No  - History of kidney stones: No  - History of kidney disease: No  - Family history of any calcium problems or kidney stones: No  - History of HCTZ use: No  - History of Lithium use: No  - History of malabsorption (IBD, Celiac, gastric bypass ): No  - History of thyroid disease: No  - Weight history: stable    Problem List     Patient Active Problem List   Diagnosis     Hypercalcemia     Menopause present     Fibromyalgia     Chronic tension headaches     Osteoporosis     TMJ (temporomandibular joint syndrome)     Abnormal Pap smear of cervix     Caregiver burden     Low back pain     Hyperparathyroidism (H)        Medications     Current Outpatient Prescriptions   Medication     acetaminophen (TYLENOL) 325 MG tablet     acetaminophen (TYLENOL) 500 MG tablet     alendronate (FOSAMAX) 70 MG tablet     Calcium Carb-Cholecalciferol 600-800 MG-UNIT TABS     capsaicin (ZOSTRIX) 0.025 % CREA cream     Cholecalciferol (VITAMIN D) 2000 units tablet     ibuprofen (ADVIL/MOTRIN) 600 MG tablet     ibuprofen (ADVIL/MOTRIN) 600 MG tablet     multivitamin, therapeutic with minerals (MULTI-VITAMIN) TABS tablet     omega 3 1000 MG CAPS     polyethylene glycol (MIRALAX) powder     senna-docusate (SENOKOT-S;PERICOLACE) 8.6-50 MG per tablet     No current facility-administered medications for this visit.         Allergies     Allergies   Allergen Reactions     Iodine Rash       Medical / Surgical History     Past Medical History:   Diagnosis Date     Ear pain     Left     Vitamin D deficiency      Past Surgical  "History:   Procedure Laterality Date     NO HISTORY OF SURGERY         Social History     Social History     Social History     Marital status: Single     Spouse name: N/A     Number of children: 2     Years of education: N/A     Occupational History     had a [employer] in [place]      Lives with children     Social History Main Topics     Smoking status: Never Smoker     Smokeless tobacco: Never Used     Alcohol use No     Drug use: No     Sexual activity: Not on file     Other Topics Concern     Not on file     Social History Narrative    Lives with adult son who has advanced XP and requires 24 hour care        Family History     Family History   Problem Relation Age of Onset     Other - See Comments Son      xeroderma pigmentosum       ROS     Constitutional: no fevers, chills, night sweats. No weight loss/gain. No fatigue. Good appetite  Eyes: no vision changes, no eye redness, no diplopia  Ears, Nose, mouth, throat: no hearing changes, no tinnitus, no rhinorrhea, no nasal congestion, no anosmia  Cardiovascular: no chest pain, no orthopnea or PND, no edema, no palpitations  Respiratory: no dyspnea, no cough, no sputum, no wheezing  Gastrointestinal: no nausea, no vomiting, no abdominal pain, no diarrhea, no constipation  Genitourinary: no dysuria, no frequency, no urgency, no nocturia  Musculoskeletal: no joint pains, no back pain, no cramps, no fractures  Skin: no rash, no itching, no dryness, no ulcers, no hair loss, no nail changes  Neurological:no headaches, no weakness, no numbness, no tingling, no tremors, no difficulty sleeping, no snoring, no AM sleepiness  Psychiatric: no anxiety, no sadness  Hematologic/lymphatic: no easy bruising, no bleeding, no palor    Physical Exam   /74  Pulse 66  Ht 1.59 m (5' 2.6\")  Wt 73 kg (160 lb 14.4 oz)  BMI 28.87 kg/m2     General: Comfortable, no obvious distress, normal body habitus  Eyes: Sclera anicteric, moist conjunctiva  HENT: Atraumatic, oropharynx " clear, moist mucous membranes with no mucosal ulcerations  Neck: Trachea midline, supple. Thyroid: Thyroid is normal in size and texture  CV: Regular rhythm, normal rate. No murmurs auscultated  Resp: Clear to auscultation bilaterally, good effort  Abdomen:  Soft, non tender, non distended. Bowel sounds heard. No organomegaly. No striae  Skin: No rashes, lesions, or subcutaneous nodules.   Psych: Alert and oriented x 3. Appropriate affect, good insight  Extremities: No peripheral edema  Musculoskeletal: Appropriate muscle bulk and strength  Lymphatic: No cervical lymphadenopathy  Neuro: Moves all four extremities. No focal deficits on limited exam. Gait normal.     Labs/Imaging     Pertinent Labs were reviewed and updated in [employer].  Radiology Results were  reviewed and updated in EPIC.    Summary of recent findings:     TSH   Date Value Ref Range Status   04/12/2017 1.12 0.40 - 4.00 mU/L Final   05/07/2015 0.93 0.40 - 4.00 mU/L Final   09/02/2011 0.82 0.4 - 5.0 mU/L Final       Creatinine   Date Value Ref Range Status   04/11/2018 0.7 0.5 - 1.0 mg/dL Final       No results found for: [id number], [id number], [id number]    Recent Labs   Lab Test  04/11/18   1549  04/12/17   1300  05/07/15   1435  02/06/15   1416   NA  137.7  134.5  140  140   POTASSIUM  3.7  3.6  4.1  3.8   CHLORIDE  102.9  100.6  105  106   CO2  26.6  27.4  28  28   ANIONGAP   --    --   6  5   GLC  117.3*  125.0*  86  81   BUN  17.1  13.6  9  13   CR  0.7  0.5  0.55  0.49*   AARON  10.8*  10.3*  9.5  10.5*     Impression / Plan     1. Hypercalcemia, Primary Hyperparathyroidism:     With elevated PTH, and 24 hr urine calcium normal, the diagnosis is primary hyperparathyroidism.     The latest NIH guidelines conclude that surgery for hyperparathyroidism is indicated in symptomatic patients, or in asymptomatic patients who meet any one of the following conditions:  Serum calcium concentration of 1.0 mg/dL (0.25 mmol/L) or more above the upper limit of  normal   Creatinine clearance that is reduced to <60 mL/min   Bone density at the hip, lumbar spine, or distal radius that is more than 2.5 standard deviations below peak bone mass (T score <-2.5) and/or previous fragility fracture   Age less than 50 years    Since she has osteoporosis (her T-score less than -3), I recommend surgery.    I have discussed with the patient the procedure of cervical exploration, parathyroidectomy. Reviewed the risks, benefits, potential complications, length of hospitalization, disability, including bleeding, infection, injury to blood vessel, nerve structures of the neck including the recurrent laryngeal nerves as well as permanent hypocalcemia. After a full discussion, she understands and would like to proceed with the workup and meet with the surgeon.    Plan:   - Sestamibi parathyroid scan  - updated set of labs: PTH, Ca, Phos, vitamin D  - DXA scan  - Will refer the patient to Dr. Le.    2. Osteoporosis:  Diagnosed 2012. T-score -3.0  Deterioration to T-score -3.3 in 2015.   Poor compliance with fosamax (weekly dosing routine is not working for patient).     Plan:   - Obtain DXA scan  - will switch to reclast, but await DXA first. Patient is in agreement.       Test and/or medications prescribed today:  Orders Placed This Encounter   Procedures     Dexa Wrist/Heel     Dexa hip/pelvis/spine     NM Parathyroid scan     Parathyroid Hormone Intact     Basic metabolic panel     Phosphorus     Vitamin D Deficiency (D3 Only)     GENERAL SURG ADULT REFERRAL         Follow up: TBD after above      Noah Hoskins MD  Endocrinology, Diabetes and Metabolism  [facility]

## 2018-09-07 NOTE — MR AVS SNAPSHOT
After Visit Summary   9/7/2018    Cindy Espinoza    MRN: [id number]           Patient Information     Date Of Birth          1960        Visit Information        Provider Department      9/7/2018 1:45 PM Noah Hoskins MD; [employer] [facility] Endocrinology        Today's Diagnoses     Primary hyperparathyroidism (H)    -  1    Age-related osteoporosis without current pathological fracture          Care Instructions    1- Lab today.  2- schedule NM parathyroid scan and DEXA scans  3- schedule referral to Dr. Rey Hoskins MD  Endocrinology, Diabetes and Metabolism  [facility]              Follow-ups after your visit        Additional Services     GENERAL SURG ADULT REFERRAL       Your provider has referred you to: Dr. Edith Le. [facility]: General Surgery Clinic [facility] [phone]   [email or web address]    Please be aware that coverage of these services is subject to the terms and limitations of your health insurance plan.  Call member services at your health plan with any benefit or coverage questions.      Please bring the following with you to your appointment:    (1) Any X-Rays, CTs or MRIs which have been performed.  Contact the facility where they were done to arrange for  prior to your scheduled appointment.   (2) List of current medications   (3) This referral request   (4) Any documents/labs given to you for this referral                  Your next 10 appointments already scheduled     Sep 07, 2018  3:45 PM CDT   LAB with [facility] ([facility])    [address] [place] [id number] [phone]           Please do not eat 10-12 hours before your appointment if you are coming in fasting for labs on lipids, cholesterol, or glucose (sugar). This does not apply to pregnant women. Water, hot tea and black coffee (with nothing added) are  okay. Do not drink other fluids, diet soda or chew gum.            Sep 10, 2018 10:20 AM CDT   Return Visit with [facility] ([facility])    [address],  [address]  [facility] [address]   [phone]            Sep 14, 2018  8:30 AM CDT   DX HIP/PELVIS/SPINE with [address] Dexa ([facility])    [address] [facility]  [address]st [facility] [id number] [phone]           Please do not take any of the following 24 hours prior to the day of your exam: vitamins, calcium tablets, antacids.  If possible, please wear clothes without metal (snaps, zippers). A sweatsuit works well.            Sep 14, 2018  9:00 AM CDT   DX WRIST/HEEL/RADIUS with [address] Dexa ([facility])    [address] [id number] [phone]           Please do not take any of the following 24 hours prior to the day of your exam: vitamins, calcium tablets, antacids.  If possible, please wear clothes without metal (snaps, zippers). A sweatsuit works well.            Sep 17, 2018 11:30 AM CDT   NM INJECT with UUNMINJ2   [facility], Nuclear Medicine ([facility])    [address] [facility] [id number] [phone]            Sep 17, 2018  2:30 PM CDT   NM PARATHYROID PLANAR IMAGING WITH TOMOGRAPHIC (SPECT), AND CT with UUNM3   [facility], Nuclear Medicine ([facility])    [address] [place] [id number] [phone]           Please bring a list of your medicines to the exam. (Include vitamins, minerals and over-the-counter drugs.) You should wear comfortable clothes. Leave your valuables at home. Please bring related prior results and films.  Tell your doctor:   If you are breastfeeding or may be pregnant.   If you have had a test  "including barium within the past 48 hours. Barium may change the results of certain exams.   If you think you may need sedation (medicine to help you relax).  This exam cannot be performed if you have had another imaging test including iodinated contrast within the past 30 days.  You may eat and drink as normal.  If you take thyroid medicine, you must talk to your doctor about stopping it. Your doctor may have you stop taking it 3 to 6 weeks before your exam.  Please call your Imaging Department at your exam site with any questions.            Oct 01, 2018  2:00 PM CDT   (Arrive by 1:45 PM)   New Patient Visit with Edith Le MD   [facility] Nose and Throat ([facility] [place] [facility])    [address] [facility]  4th [facility] [address] [phone]              Future tests that were ordered for you today     Open Future Orders        Priority Expected Expires Ordered    Dexa Wrist/Heel Routine  4/5/2019 9/7/2018    Dexa hip/pelvis/spine Routine  4/5/2019 9/7/2018    Parathyroid Hormone Intact Routine  9/7/2019 9/7/2018    Basic metabolic panel Routine  9/7/2019 9/7/2018    Phosphorus Routine  9/7/2019 9/7/2018    Vitamin D Deficiency (D3 Only) Routine  9/7/2019 9/7/2018    NM Parathy Plnr Img w Uziel Spect & CT Routine  9/7/2019 9/7/2018            Who to contact     Please call your clinic at [phone] to:    Ask questions about your health    Make or cancel appointments    Discuss your medicines    Learn about your test results    Speak to your doctor            Additional Information About Your Visit        Care EveryWhere ID     This is your Care EveryWhere ID. This could be used by other organizations to access your [place] medical records  [id number]        Your Vitals Were     Pulse Height BMI (Body Mass Index)             66 1.59 m (5' 2.6\") 28.87 kg/m2          Blood Pressure from Last 3 Encounters:   09/07/18 110/74   07/13/18 119/83   07/02/18 121/80    Weight from " Last 3 Encounters:   09/07/18 73 kg (160 lb 14.4 oz)   07/13/18 71.5 kg (157 lb 9.6 oz)   07/02/18 71.4 kg (157 lb 6.4 oz)              We Performed the Following     GENERAL SURG ADULT REFERRAL        Primary Care Provider Office Phone # Fax #    Walter Qi Kulkarni [phone]       [facility] [address] [address]        Equal Access to Services     GYPSY BELTRE : Hadii aad ku hadasho Soomaali, waaxda luqadaha, qaybta kaalmada adeegyada, waxay idiin hayaan adeeg guerrero laklaus . So [facility] [place] [facility] [phone].    ATENCIÓN: Si habla español, tiene a lopez disposición servicios gratuitos de asistencia lingüística. [facility] [phone].    We comply with applicable federal civil rights laws and [place] laws. We do not discriminate on the basis of race, color, national origin, age, disability, sex, sexual orientation, or gender identity.            Thank you!     Thank you for choosing [facility]  for your care. Our goal is always to provide you with excellent care. Hearing back from our patients is one way we can continue to improve our services. Please take a few minutes to complete the written survey that you may receive in the mail after your visit with us. Thank you!             Your Updated Medication List - Protect others around you: Learn how to safely use, store and throw away your medicines at www.disposemymeds.org.          This list is accurate as of 9/7/18  2:58 PM.  Always use your most recent med list.                   Brand Name Dispense Instructions for use Diagnosis    * acetaminophen 325 MG tablet    TYLENOL    100 tablet    Take 2 tablets (650 mg) by mouth every 6 hours as needed for mild pain Do not exceed 3 g acetaminophen from all sources within 24 hours    Bilateral low back pain with left-sided sciatica       * acetaminophen 500 MG tablet    TYLENOL    100 tablet    Take 1 tablet (500 mg) by mouth every 6 hours as needed for mild pain     Lumbar back pain with radiculopathy affecting right lower extremity       alendronate 70 MG tablet    FOSAMAX    30 tablet    Take 1 tablet (70 mg) by mouth every 7 days Take with over 8 ounces water & stay upright for at least 30 minutes after dose.Take at least 60 minutes before breakfast    Age-related osteoporosis without current pathological fracture       Calcium Carb-Cholecalciferol 600-800 MG-UNIT Tabs     180 tablet    Take 600-800 mg by mouth 2 times daily    Age-related osteoporosis without current pathological fracture       capsaicin 0.025 % Crea cream    ZOSTRIX    45 g    Apply 1 g topically 3 times daily as needed Wash hands with soap and water after applications.    Chronic bilateral low back pain with right-sided sciatica       * ibuprofen 600 MG tablet    ADVIL/MOTRIN    30 tablet    Take 1 tablet (600 mg) by mouth every 6 hours as needed for moderate pain    Chronic low back pain with sciatica, sciatica laterality unspecified, unspecified back pain laterality       * ibuprofen 600 MG tablet    ADVIL/MOTRIN    90 tablet    Take 1 tablet (600 mg) by mouth every 6 hours as needed for moderate pain    Lumbar back pain with radiculopathy affecting right lower extremity       multivitamin, therapeutic with minerals Tabs tablet     100 tablet    Take 1 tablet by mouth daily    Fatigue, unspecified type       omega 3 1000 MG Caps     90 capsule    Take 1 g by mouth daily    Health maintenance alteration       polyethylene glycol powder    MIRALAX    510 g    Take 17 g (1 capful) by mouth daily    Slow transit constipation       senna-docusate 8.6-50 MG per tablet    SENOKOT-S;PERICOLACE    60 tablet    Take 2 tablets by mouth 2 times daily    Constipation, unspecified constipation type       vitamin D 2000 units tablet     100 tablet    Take 2,000 Units by mouth daily    Vitamin D deficiency       * Notice:  This list has 4 medication(s) that are the same as other medications prescribed for you. Read  the directions carefully, and ask your doctor or other care provider to review them with you.

## 2018-09-07 NOTE — PROGRESS NOTES
Endocrinology Clinic Visit 9/7/2018    NAME:  Cindy Espinoza  PCP:  Walter Kulkarni  MRN:  [id number]  Reason for Consult:  hyperparathyroidism  Requesting Provider:  Donna Pryor    Chief Complaint     Chief Complaint   Patient presents with     RECHECK     HYERPARATHYROID       History of Present Illness     Cindy Espinoza is a 58 year old female who is seen in clinic for management of hyperparathyroidism.     History obtained with help of .     In reviewing her chart, she has had hypercalcemia since as far back as 2012. PTH also checked since 2012 has been elevated. Ranging . In 2012, she had a 24 hr urine calcium that was 180 mg/24 hrs. Serum calcium then was 10.6, .  She saw Dr. Aguilar (Endocrine, [facility]) in 2012 and was diagnosed with primary hyperparathyroidism, and recommended parathyroidectomy due to osteoporosis. DXA scan then showed lowest T-score of -3.0 in the lumbar spine. Head and neck US then did not seem to localize any parathyroid adenoma. ptient was lost to f/up until 2015, when she saw Dr. Trent Bonilla (Endocrine). She was advised observation. Failed to f/up.   Last DXA scan 5/8/2015 showed: osteoporosis, with lowest T-score of -3.3 in the L1-L4 spine. -4% decrease in spine BMD since 2012.     Today she has been re-referred to us for the same issue.   Last set of labs: Her serum calcium level was elevated at 10.8 mg/dL in April 2018. PT was 97, vitamin D 20 ng/mL.   1,25 dihydroxy-D: 50.8 pg/mL.     Symptoms of hypercalcemia: no constipation, no dyspepsia, no mental status changes/lethargy, no polyuria.    Calcium/D intake: 1 tab Calcium 600+D 800 once a day, 1 tab of vitamin D 2000 international units daily.     Dietary: 1-2 cups of milk a day.     She has been prescribed Fosamax for many years. However, she adits to not remembering to take it. She takes the first tablet of the month, then forgets the rest of month. Then she gets called by the pharmacy for a refill,  and takes one tab and forgets the rest of the month. She has not taken any Fosamax for the past 7 months.     - Previous fractures: No  - History of kidney stones: No  - History of kidney disease: No  - Family history of any calcium problems or kidney stones: No  - History of HCTZ use: No  - History of Lithium use: No  - History of malabsorption (IBD, Celiac, gastric bypass ): No  - History of thyroid disease: No  - Weight history: stable    Problem List     Patient Active Problem List   Diagnosis     Hypercalcemia     Menopause present     Fibromyalgia     Chronic tension headaches     Osteoporosis     TMJ (temporomandibular joint syndrome)     Abnormal Pap smear of cervix     Caregiver burden     Low back pain     Hyperparathyroidism (H)        Medications     Current Outpatient Prescriptions   Medication     acetaminophen (TYLENOL) 325 MG tablet     acetaminophen (TYLENOL) 500 MG tablet     alendronate (FOSAMAX) 70 MG tablet     Calcium Carb-Cholecalciferol 600-800 MG-UNIT TABS     capsaicin (ZOSTRIX) 0.025 % CREA cream     Cholecalciferol (VITAMIN D) 2000 units tablet     ibuprofen (ADVIL/MOTRIN) 600 MG tablet     ibuprofen (ADVIL/MOTRIN) 600 MG tablet     multivitamin, therapeutic with minerals (MULTI-VITAMIN) TABS tablet     omega 3 1000 MG CAPS     polyethylene glycol (MIRALAX) powder     senna-docusate (SENOKOT-S;PERICOLACE) 8.6-50 MG per tablet     No current facility-administered medications for this visit.         Allergies     Allergies   Allergen Reactions     Iodine Rash       Medical / Surgical History     Past Medical History:   Diagnosis Date     Ear pain     Left     Vitamin D deficiency      Past Surgical History:   Procedure Laterality Date     NO HISTORY OF SURGERY         Social History     Social History     Social History     Marital status: Single     Spouse name: N/A     Number of children: 2     Years of education: N/A     Occupational History     had a smal lbuisiness in [employer]       "Lives with children     Social History Main Topics     Smoking status: Never Smoker     Smokeless tobacco: Never Used     Alcohol use No     Drug use: No     Sexual activity: Not on file     Other Topics Concern     Not on file     Social History Narrative    Lives with adult son who has advanced XP and requires 24 hour care        Family History     Family History   Problem Relation Age of Onset     Other - See Comments Son      xeroderma pigmentosum       ROS     Constitutional: no fevers, chills, night sweats. No weight loss/gain. No fatigue. Good appetite  Eyes: no vision changes, no eye redness, no diplopia  Ears, Nose, mouth, throat: no hearing changes, no tinnitus, no rhinorrhea, no nasal congestion, no anosmia  Cardiovascular: no chest pain, no orthopnea or PND, no edema, no palpitations  Respiratory: no dyspnea, no cough, no sputum, no wheezing  Gastrointestinal: no nausea, no vomiting, no abdominal pain, no diarrhea, no constipation  Genitourinary: no dysuria, no frequency, no urgency, no nocturia  Musculoskeletal: no joint pains, no back pain, no cramps, no fractures  Skin: no rash, no itching, no dryness, no ulcers, no hair loss, no nail changes  Neurological:no headaches, no weakness, no numbness, no tingling, no tremors, no difficulty sleeping, no snoring, no AM sleepiness  Psychiatric: no anxiety, no sadness  Hematologic/lymphatic: no easy bruising, no bleeding, no palor    Physical Exam   /74  Pulse 66  Ht 1.59 m (5' 2.6\")  Wt 73 kg (160 lb 14.4 oz)  BMI 28.87 kg/m2     General: Comfortable, no obvious distress, normal body habitus  Eyes: Sclera anicteric, moist conjunctiva  HENT: Atraumatic, oropharynx clear, moist mucous membranes with no mucosal ulcerations  Neck: Trachea midline, supple. Thyroid: Thyroid is normal in size and texture  CV: Regular rhythm, normal rate. No murmurs auscultated  Resp: Clear to auscultation bilaterally, good effort  Abdomen:  Soft, non tender, non distended. " Bowel sounds heard. No organomegaly. No striae  Skin: No rashes, lesions, or subcutaneous nodules.   Psych: Alert and oriented x 3. Appropriate affect, good insight  Extremities: No peripheral edema  Musculoskeletal: Appropriate muscle bulk and strength  Lymphatic: No cervical lymphadenopathy  Neuro: Moves all four extremities. No focal deficits on limited exam. Gait normal.     Labs/Imaging     Pertinent Labs were reviewed and updated in [facility].  Radiology Results were  reviewed and updated in EPIC.    Summary of recent findings:     TSH   Date Value Ref Range Status   04/12/2017 1.12 0.40 - 4.00 mU/L Final   05/07/2015 0.93 0.40 - 4.00 mU/L Final   09/02/2011 0.82 0.4 - 5.0 mU/L Final       Creatinine   Date Value Ref Range Status   04/11/2018 0.7 0.5 - 1.0 mg/dL Final       No results found for: [id number], [id number], [id number]    Recent Labs   Lab Test  04/11/18   1549  04/12/17   1300  05/07/15   1435  02/06/15   1416   NA  137.7  134.5  140  140   POTASSIUM  3.7  3.6  4.1  3.8   CHLORIDE  102.9  100.6  105  106   CO2  26.6  27.4  28  28   ANIONGAP   --    --   6  5   GLC  117.3*  125.0*  86  81   BUN  17.1  13.6  9  13   CR  0.7  0.5  0.55  0.49*   AARON  10.8*  10.3*  9.5  10.5*     Impression / Plan     1. Hypercalcemia, Primary Hyperparathyroidism:     With elevated PTH, and 24 hr urine calcium normal, the diagnosis is primary hyperparathyroidism.     The latest NIH guidelines conclude that surgery for hyperparathyroidism is indicated in symptomatic patients, or in asymptomatic patients who meet any one of the following conditions:  Serum calcium concentration of 1.0 mg/dL (0.25 mmol/L) or more above the upper limit of normal   Creatinine clearance that is reduced to <60 mL/min   Bone density at the hip, lumbar spine, or distal radius that is more than 2.5 standard deviations below peak bone mass (T score <-2.5) and/or previous fragility fracture   Age less than 50 years    Since she has osteoporosis (her  T-score less than -3), I recommend surgery.    I have discussed with the patient the procedure of cervical exploration, parathyroidectomy. Reviewed the risks, benefits, potential complications, length of hospitalization, disability, including bleeding, infection, injury to blood vessel, nerve structures of the neck including the recurrent laryngeal nerves as well as permanent hypocalcemia. After a full discussion, she understands and would like to proceed with the workup and meet with the surgeon.    Plan:   - Sestamibi parathyroid scan  - updated set of labs: PTH, Ca, Phos, vitamin D  - DXA scan  - Will refer the patient to Dr. Le.    2. Osteoporosis:  Diagnosed 2012. T-score -3.0  Deterioration to T-score -3.3 in 2015.   Poor compliance with fosamax (weekly dosing routine is not working for patient).     Plan:   - Obtain DXA scan  - will switch to reclast, but await DXA first. Patient is in agreement.       Test and/or medications prescribed today:  Orders Placed This Encounter   Procedures     Dexa Wrist/Heel     Dexa hip/pelvis/spine     NM Parathyroid scan     Parathyroid Hormone Intact     Basic metabolic panel     Phosphorus     Vitamin D Deficiency (D3 Only)     GENERAL SURG ADULT REFERRAL         Follow up: TBD after above      Noah Hoskins MD  Endocrinology, Diabetes and Metabolism  [facility]

## 2018-09-10 ENCOUNTER — OFFICE VISIT (OUTPATIENT)
Dept: FAMILY MEDICINE | Facility: CLINIC | Age: 58
End: 2018-09-10
Payer: COMMERCIAL

## 2018-09-10 VITALS
HEART RATE: 103 BPM | WEIGHT: 159 LBS | TEMPERATURE: 98 F | DIASTOLIC BLOOD PRESSURE: 81 MMHG | SYSTOLIC BLOOD PRESSURE: 122 MMHG | BODY MASS INDEX: 28.53 KG/M2 | RESPIRATION RATE: 16 BRPM | OXYGEN SATURATION: 94 %

## 2018-09-10 DIAGNOSIS — E21.3 HYPERPARATHYROIDISM (H): ICD-10-CM

## 2018-09-10 DIAGNOSIS — G89.29 CHRONIC BILATERAL LOW BACK PAIN WITH RIGHT-SIDED SCIATICA: Primary | ICD-10-CM

## 2018-09-10 DIAGNOSIS — M81.0 AGE-RELATED OSTEOPOROSIS WITHOUT CURRENT PATHOLOGICAL FRACTURE: ICD-10-CM

## 2018-09-10 DIAGNOSIS — M54.41 CHRONIC BILATERAL LOW BACK PAIN WITH RIGHT-SIDED SCIATICA: Primary | ICD-10-CM

## 2018-09-10 DIAGNOSIS — J01.00 ACUTE NON-RECURRENT MAXILLARY SINUSITIS: ICD-10-CM

## 2018-09-10 LAB — DEPRECATED CALCIDIOL+CALCIFEROL SERPL-MC: 28 UG/L (ref 20–75)

## 2018-09-10 RX ORDER — ACETAMINOPHEN 500 MG
1000 TABLET ORAL EVERY 8 HOURS PRN
Qty: 100 TABLET | Refills: 11 | Status: SHIPPED | OUTPATIENT
Start: 2018-09-10 | End: 2019-10-23

## 2018-09-10 NOTE — MR AVS SNAPSHOT
After Visit Summary   9/10/2018    Cindy Espinoza    MRN: [id number]           Patient Information     Date Of Birth          1960        Visit Information        Provider Department      9/10/2018 10:20 AM Donna Pryor DO [facility]        Today's Diagnoses     Chronic bilateral low back pain with right-sided sciatica    -  1    Hyperparathyroidism (H)        Age-related osteoporosis without current pathological fracture        Acute non-recurrent maxillary sinusitis          Care Instructions    Here is the plan from today's visit    1. Chronic bilateral low back pain with right-sided sciatica  - vitamin B-Complex; Take 1 tablet by mouth daily  Dispense: 90 tablet; Refill: 3  - acetaminophen (TYLENOL) 500 MG tablet; Take 2 tablets (1,000 mg) by mouth every 8 hours as needed for pain  Dispense: 100 tablet; Refill: 11    2. Hyperparathyroidism (H)    3. Age-related osteoporosis without current pathological fracture    4. Acute non-recurrent maxillary sinusitis  - amoxicillin-clavulanate (AUGMENTIN) 875-125 MG per tablet; Take 1 tablet by mouth 2 times daily  Dispense: 20 tablet; Refill: 0    Please call or return to clinic if your symptoms don't go away.    Follow up plan  Please make a clinic appointment for follow up with your primary physician Walter Kulkarni MD in 1 month for follow-up.    Thank you for coming to [facility] today.  Lab Testing:  **If you had lab testing today and your results are reassuring or normal they will be mailed to you or sent through [employer] within 7 days.   **If the lab tests need quick action we will call you with the results.  The phone number we will call with results is # [phone] (home) . If this is not the best number please call our clinic and change the number.  Medication Refills:  If you need any refills please call your pharmacy and they will contact us.   If you need to  your refill at a new pharmacy, please contact  the new pharmacy directly. The new pharmacy will help you get your medications transferred faster.   Scheduling:  If you have any concerns about today's visit or wish to schedule another appointment please call our office during normal business hours [phone] (8-5:00 M-F)  If a referral was made to a [facility] and you don't get a call from central scheduling please call [phone].  If a Mammogram was ordered for you at [facility] call [phone] to schedule or change your appointment.  If you had an XRay/CT/Ultrasound/MRI ordered the number is [phone] to schedule or change your radiology appointment.   Medical Concerns:  If you have urgent medical concerns please call [phone] at any time of the day.    Donna Pryor, DO            Follow-ups after your visit        Follow-up notes from your care team     Return in about 4 weeks (around 10/8/2018) for f/u back pain, hyperparathryoidism.      Your next 10 appointments already scheduled     Sep 14, 2018  8:30 AM CDT   DX HIP/PELVIS/SPINE with [address] [facility] ([facility])    [address] [address] [phone]           Please do not take any of the following 24 hours prior to the day of your exam: vitamins, calcium tablets, antacids.  If possible, please wear clothes without metal (snaps, zippers). A sweatsuit works well.            Sep 14, 2018  9:00 AM CDT   DX WRIST/HEEL/RADIUS with [address] [facility] ([facility])    [address] [address] [phone]           Please do not take any of the following 24 hours prior to the day of your exam: vitamins, calcium tablets, antacids.  If possible, please wear clothes without metal (snaps, zippers). A sweatsuit works well.            Sep 17, 2018 11:30 AM CDT   NM INJECT with UUNMINJ2   [facility], [place], [facility]  [facility]icine ([facility], [facility])    [address] [facility] [address] [phone]            Sep 17, 2018  2:30 PM CDT   NM PARATHYROID PLANAR IMAGING WITH TOMOGRAPHIC (SPECT), AND CT with UUNM3   [facility], [place], Nuclear Medicine ([facility], [facility])    [address] [facility] [address] [phone]           Please bring a list of your medicines to the exam. (Include vitamins, minerals and over-the-counter drugs.) You should wear comfortable clothes. Leave your valuables at home. Please bring related prior results and films.  Tell your doctor:   If you are breastfeeding or may be pregnant.   If you have had a test including barium within the past 48 hours. Barium may change the results of certain exams.   If you think you may need sedation (medicine to help you relax).  This exam cannot be performed if you have had another imaging test including iodinated contrast within the past 30 days.  You may eat and drink as normal.  If you take thyroid medicine, you must talk to your doctor about stopping it. Your doctor may have you stop taking it 3 to 6 weeks before your exam.  Please call your Imaging Department at your exam site with any questions.            Oct 01, 2018  2:00 PM CDT   (Arrive by 1:45 PM)   New Patient Visit with Edith Le MD   [facility] ([facility])    [address] [id number] [phone]              Who to contact     Please call your clinic at [phone] to:    Ask questions about your health    Make or cancel appointments    Discuss your medicines    Learn about your test results    Speak to your doctor            Additional Information About Your Visit        Care EveryWhere ID     This is your Care EveryWhere ID. This could be used by other organizations to access your [facility]  records  [id number]        Your Vitals Were     Pulse Temperature Respirations Pulse Oximetry BMI (Body Mass Index)       103 98  F (36.7  C) (Oral) 16 94% 28.53 kg/m2        Blood Pressure from Last 3 Encounters:   09/10/18 122/81   09/07/18 110/74   07/13/18 119/83    Weight from Last 3 Encounters:   09/10/18 159 lb (72.1 kg)   09/07/18 160 lb 14.4 oz (73 kg)   07/13/18 157 lb 9.6 oz (71.5 kg)              Today, you had the following     No orders found for display         Today's Medication Changes          These changes are accurate as of 9/10/18 10:59 AM.  If you have any questions, ask your nurse or doctor.               Start taking these medicines.        Dose/Directions    amoxicillin-clavulanate 875-125 MG per tablet   Commonly known as:  AUGMENTIN   Used for:  Acute non-recurrent maxillary sinusitis   Started by:  Donna Pryor DO        Dose:  1 tablet   Take 1 tablet by mouth 2 times daily   Quantity:  20 tablet   Refills:  0       vitamin B-Complex   Used for:  Chronic bilateral low back pain with right-sided sciatica   Started by:  Donna Pryor DO        Dose:  1 tablet   Take 1 tablet by mouth daily   Quantity:  90 tablet   Refills:  3         These medicines have changed or have updated prescriptions.        Dose/Directions    acetaminophen 500 MG tablet   Commonly known as:  TYLENOL   This may have changed:    - how much to take  - when to take this  - reasons to take this   Used for:  Chronic bilateral low back pain with right-sided sciatica   Changed by:  Donna Pryor DO        Dose:  1000 mg   Take 2 tablets (1,000 mg) by mouth every 8 hours as needed for pain   Quantity:  100 tablet   Refills:  11            Where to get your medicines      These medications were sent to [facility] - [place], [place] - [address] [place] [address]     Phone:  [phone]     acetaminophen 500 MG tablet    amoxicillin-clavulanate 875-125 MG per tablet    vitamin  B-Complex                Primary Care Provider Office Phone # Fax #    Walter Kulkarni [phone]       [facility] [address]   [facility] [address]        Equal Access to Services     GYPSY BELTRE : Hadii aad ku hadkayodeo Solauraali, waaxda luqadaha, qaybta kaalmada aderuddyda, cooper rivera abijames masters terry lind. So [facility] [phone].    ATENCIÓN: Si habla español, tiene a lopez disposición servicios gratuitos de asistencia lingüística. Llame al [phone].    We comply with applicable federal civil rights laws and [place] laws. We do not discriminate on the basis of race, color, national origin, age, disability, sex, sexual orientation, or gender identity.            Thank you!     Thank you for choosing [facility]  for your care. Our goal is always to provide you with excellent care. Hearing back from our patients is one way we can continue to improve our services. Please take a few minutes to complete the written survey that you may receive in the mail after your visit with us. Thank you!             Your Updated Medication List - Protect others around you: Learn how to safely use, store and throw away your medicines at www.disposemymeds.org.          This list is accurate as of 9/10/18 10:59 AM.  Always use your most recent med list.                   Brand Name Dispense Instructions for use Diagnosis    acetaminophen 500 MG tablet    TYLENOL    100 tablet    Take 2 tablets (1,000 mg) by mouth every 8 hours as needed for pain    Chronic bilateral low back pain with right-sided sciatica       alendronate 70 MG tablet    FOSAMAX    30 tablet    Take 1 tablet (70 mg) by mouth every 7 days Take with over 8 ounces water & stay upright for at least 30 minutes after dose.Take at least 60 minutes before breakfast    Age-related osteoporosis without current pathological fracture       amoxicillin-clavulanate 875-125 MG per tablet    AUGMENTIN    20  tablet    Take 1 tablet by mouth 2 times daily    Acute non-recurrent maxillary sinusitis       Calcium Carb-Cholecalciferol 600-800 MG-UNIT Tabs     180 tablet    Take 600-800 mg by mouth 2 times daily    Age-related osteoporosis without current pathological fracture       capsaicin 0.025 % Crea cream    ZOSTRIX    45 g    Apply 1 g topically 3 times daily as needed Wash hands with soap and water after applications.    Chronic bilateral low back pain with right-sided sciatica       ibuprofen 600 MG tablet    ADVIL/MOTRIN    90 tablet    Take 1 tablet (600 mg) by mouth every 6 hours as needed for moderate pain    Lumbar back pain with radiculopathy affecting right lower extremity       multivitamin, therapeutic with minerals Tabs tablet     100 tablet    Take 1 tablet by mouth daily    Fatigue, unspecified type       omega 3 1000 MG Caps     90 capsule    Take 1 g by mouth daily    Health maintenance alteration       polyethylene glycol powder    MIRALAX    510 g    Take 17 g (1 capful) by mouth daily    Slow transit constipation       senna-docusate 8.6-50 MG per tablet    SENOKOT-S;PERICOLACE    60 tablet    Take 2 tablets by mouth 2 times daily    Constipation, unspecified constipation type       vitamin B-Complex     90 tablet    Take 1 tablet by mouth daily    Chronic bilateral low back pain with right-sided sciatica       vitamin D 2000 units tablet     100 tablet    Take 2,000 Units by mouth daily    Vitamin D deficiency

## 2018-09-10 NOTE — PATIENT INSTRUCTIONS
Here is the plan from today's visit    1. Chronic bilateral low back pain with right-sided sciatica  - vitamin B-Complex; Take 1 tablet by mouth daily  Dispense: 90 tablet; Refill: 3  - acetaminophen (TYLENOL) 500 MG tablet; Take 2 tablets (1,000 mg) by mouth every 8 hours as needed for pain  Dispense: 100 tablet; Refill: 11    2. Hyperparathyroidism (H)    3. Age-related osteoporosis without current pathological fracture    4. Acute non-recurrent maxillary sinusitis  - amoxicillin-clavulanate (AUGMENTIN) 875-125 MG per tablet; Take 1 tablet by mouth 2 times daily  Dispense: 20 tablet; Refill: 0    Please call or return to clinic if your symptoms don't go away.    Follow up plan  Please make a clinic appointment for follow up with your primary physician Walter Kulkarni MD in 1 month for follow-up.    Thank you for coming to [facility] today.  Lab Testing:  **If you had lab testing today and your results are reassuring or normal they will be mailed to you or sent through [employer] within 7 days.   **If the lab tests need quick action we will call you with the results.  The phone number we will call with results is # [phone] (home) . If this is not the best number please call our clinic and change the number.  Medication Refills:  If you need any refills please call your pharmacy and they will contact us.   If you need to  your refill at a new pharmacy, please contact the new pharmacy directly. The new pharmacy will help you get your medications transferred faster.   Scheduling:  If you have any concerns about today's visit or wish to schedule another appointment please call our office during normal business hours [phone] (8-5:00 M-F)  If a referral was made to a [facility] and you don't get a call from central scheduling please call [phone].  If a Mammogram was ordered for you at [facility] call [phone] to schedule or change your appointment.  If you had an  XRay/CT/Ultrasound/MRI ordered the number is [phone] to schedule or change your radiology appointment.   Medical Concerns:  If you have urgent medical concerns please call [phone] at any time of the day.    Donna Pryor, DO

## 2018-09-10 NOTE — NURSING NOTE
Due to patient being non-English speaking/uses sign language, an  was used for this visit. Only for face-to-face interpretation by an external agency, date and length of interpretation can be found on the scanned worksheet.     name: Paradise Olmstead  Agency: [employer]  Language: South Korean   Telephone number: [phone]  Type of interpretation: Face-to-face, spoken     Cassandra Jones CMA 10:31 AM September 10, 2018

## 2018-09-10 NOTE — PROGRESS NOTES
HPI       Cindy Espinoza is a 58 year old  who presents for   Chief Complaint   Patient presents with     Back Pain     Sometime whole back and sometime lower right side including R leg getting worse since May        Back Pain Follow Up    Description:   Location of pain:  R Lower,  right  Character of pain: dull ache and when taking pain meds it goes down but then comes back.  Pain radiation: Does not radiate and goes down right side down to leg  Since last visit, pain is:  worsened  New numbness or weakness in legs, not attributed to pain:  no  What therapies have you tried? Discussed  What has worked well? PT while doing the session but afterwards pain comes back again.    Intensity: Currently depends/10, between 5-8, sometimes 5-6, sometimes 7-8    History:   Pain interferes with job: Yes Details: cannot do any lifting or bending  Therapies tried without relief: acetaminophen (Tylenol) and Physical Therapy  Therapies tried with relief: acetaminophen (Tylenol) and Physical Therapy     Accompanying Signs & Symptoms:  Risk of Fracture:  Osteoporosis  Risk of Cauda Equina:  None  Risk of Infection:  None  Risk of Cancer:  None      [place]ravelled out of the country, was prescribed a B multivitamin which has been helping her pain. She would like this [place] in the [place]. Also wants to know which of her pain medications will not affect her kidneys.   A LiftMetrix  was used for  this visit.    +++++++    Cindy also presents with 2 weeks of facial and sinus pressure with headache. Waxing and waning in intensity. Worse today. Has dry cough. Admits to postnasal drip and rhinorrhea. Bilateral ear fullness but no pain. No sore throat. Taking pain medication with some relief. Did have some sick contacts. Feels like sinusitis she had several years ago.  No fever.     Problem, Medication and Allergy Lists were reviewed and updated if needed..    Patient is an established patient of this clinic..         Review of Systems:    Review of Systems  As per HPI       Physical Exam:     Vitals:    09/10/18 1031   BP: 122/81   Pulse: 103   Resp: 16   Temp: 98  F (36.7  C)   TempSrc: Oral   SpO2: 94%   Weight: 159 lb (72.1 kg)     Body mass index is 28.53 kg/(m^2).  Vitals were reviewed and were normal     Physical Exam   Constitutional: She is oriented to person, place, and time. She appears well-developed.   HENT:   Head: Normocephalic and atraumatic.   Right Ear: A middle ear effusion is present.   Left Ear: A middle ear effusion is present.   Nose: Mucosal edema and rhinorrhea present. Right sinus exhibits maxillary sinus tenderness. Right sinus exhibits no frontal sinus tenderness. Left sinus exhibits maxillary sinus tenderness. Left sinus exhibits no frontal sinus tenderness.   Mouth/Throat: Oropharynx is clear and moist and mucous membranes are normal.   Eyes: Conjunctivae are normal.   Neck: Normal range of motion. Neck supple. No thyromegaly present.   Pulmonary/Chest: Effort normal.   Abdominal: Soft. She exhibits no distension. There is no tenderness. There is no rebound and no guarding.   Lymphadenopathy:     She has no cervical adenopathy.   Neurological: She is alert and oriented to person, place, and time. She has normal reflexes.   Skin: Skin is warm and dry.   Psychiatric: She has a normal mood and affect. Her behavior is normal. Judgment and thought content normal.         Results:   No testing ordered today    Assessment and Plan        Cindy was seen today for back pain.    Diagnoses and all orders for this visit:    Chronic bilateral low back pain with right-sided sciatica  Discussed that it would be unusual that Vitamin B helps her pain, but since it is water soluble, would not be harmful. Patient prefers to take. Advised her this is likely not covered by insurance for this indication. She has had B12 levels done in the past and they were normal.     Discussed that tylenol does not affect her kidneys (compared with  ibuprofen).     -     vitamin B-Complex; Take 1 tablet by mouth daily  -     acetaminophen (TYLENOL) 500 MG tablet; Take 2 tablets (1,000 mg) by mouth every 8 hours as needed for pain    Hyperparathyroidism (H)  Encouraged her to keep appointments for testing as surgery has been recommended.     Age-related osteoporosis without current pathological fracture    Acute non-recurrent maxillary sinusitis  Given duration >10 days, will treat for bacterial sinusitis.     -     amoxicillin-clavulanate (AUGMENTIN) 875-125 MG per tablet; Take 1 tablet by mouth 2 times daily    RTC in 1 month to follow-up with PCP.        There are no discontinued medications.    Options for treatment and follow-up care were reviewed with the patient. Cindy Espinoza  engaged in the decision making process and verbalized understanding of the options discussed and agreed with the final plan.    Donna Pryor DO

## 2018-09-14 ENCOUNTER — RADIANT APPOINTMENT (OUTPATIENT)
Dept: BONE DENSITY | Facility: CLINIC | Age: 58
End: 2018-09-14
Attending: INTERNAL MEDICINE
Payer: COMMERCIAL

## 2018-09-14 DIAGNOSIS — M81.0 AGE-RELATED OSTEOPOROSIS WITHOUT CURRENT PATHOLOGICAL FRACTURE: ICD-10-CM

## 2018-09-14 DIAGNOSIS — E21.0 PRIMARY HYPERPARATHYROIDISM (H): ICD-10-CM

## 2018-09-17 ENCOUNTER — HOSPITAL ENCOUNTER (OUTPATIENT)
Dept: NUCLEAR MEDICINE | Facility: CLINIC | Age: 58
Setting detail: NUCLEAR MEDICINE
End: 2018-09-17
Attending: INTERNAL MEDICINE
Payer: COMMERCIAL

## 2018-09-17 ENCOUNTER — HOSPITAL ENCOUNTER (OUTPATIENT)
Dept: NUCLEAR MEDICINE | Facility: CLINIC | Age: 58
Setting detail: NUCLEAR MEDICINE
Discharge: HOME | End: 2018-09-17
Attending: INTERNAL MEDICINE | Admitting: INTERNAL MEDICINE
Payer: COMMERCIAL

## 2018-09-17 DIAGNOSIS — E21.0 PRIMARY HYPERPARATHYROIDISM (H): ICD-10-CM

## 2018-09-17 PROCEDURE — 78072 PARATHYRD PLANAR W/SPECT&CT: CPT

## 2018-09-17 PROCEDURE — A9516 IODINE I-123 SOD IODIDE MIC: HCPCS

## 2018-09-17 PROCEDURE — A9500 TC99M SESTAMIBI: HCPCS

## 2018-09-17 PROCEDURE — T1013 SIGN LANG/ORAL INTERPRETER: HCPCS | Mod: U3

## 2018-09-17 PROCEDURE — 34300033 ZZH RX 343

## 2018-09-17 RX ADMIN — Medication 27 MCI.: at 15:44

## 2018-09-17 RX ADMIN — Medication 762 UCI.: at 12:00

## 2018-09-19 ENCOUNTER — TELEPHONE (OUTPATIENT)
Dept: ENDOCRINOLOGY | Facility: CLINIC | Age: 58
End: 2018-09-19

## 2018-09-19 DIAGNOSIS — K59.00 CONSTIPATION, UNSPECIFIED CONSTIPATION TYPE: ICD-10-CM

## 2018-09-19 RX ORDER — ZOLEDRONIC ACID 5 MG/100ML
5 INJECTION, SOLUTION INTRAVENOUS ONCE
Status: CANCELLED
Start: 2018-09-19 | End: 2018-09-19

## 2018-09-19 NOTE — TELEPHONE ENCOUNTER
Please get PS for Zoledronic acid and once approved, please call patient to schedule infusion. Patient is aware of the plan, I was just waiting for her DXA result, which shows osteoporosis with significant decline since the last DXA.. She will need an  when you call her.       Noah Hoskins MD  Endocrinology, Diabetes and Metabolism  [facility]

## 2018-09-19 NOTE — TELEPHONE ENCOUNTER

## 2018-09-20 ENCOUNTER — TELEPHONE (OUTPATIENT)
Dept: ENDOCRINOLOGY | Facility: CLINIC | Age: 58
End: 2018-09-20

## 2018-09-20 RX ORDER — AMOXICILLIN 250 MG
2 CAPSULE ORAL 2 TIMES DAILY
Qty: 60 TABLET | Refills: 1 | Status: SHIPPED | OUTPATIENT
Start: 2018-09-20 | End: 2018-10-03

## 2018-09-20 NOTE — TELEPHONE ENCOUNTER
PA for Reclast  5 mg  approved .Please contact patient and help schedule  In infusion center when convenient for them .

## 2018-09-27 ENCOUNTER — HOSPITAL ENCOUNTER (EMERGENCY)
Facility: CLINIC | Age: 58
Discharge: HOME | End: 2018-09-27
Attending: EMERGENCY MEDICINE | Admitting: EMERGENCY MEDICINE
Payer: COMMERCIAL

## 2018-09-27 VITALS
TEMPERATURE: 97.7 F | RESPIRATION RATE: 16 BRPM | OXYGEN SATURATION: 94 % | DIASTOLIC BLOOD PRESSURE: 68 MMHG | WEIGHT: 160 LBS | SYSTOLIC BLOOD PRESSURE: 116 MMHG | BODY MASS INDEX: 28.71 KG/M2 | HEART RATE: 73 BPM

## 2018-09-27 DIAGNOSIS — G89.29 CHRONIC BILATERAL LOW BACK PAIN WITH RIGHT-SIDED SCIATICA: ICD-10-CM

## 2018-09-27 DIAGNOSIS — M54.41 CHRONIC BILATERAL LOW BACK PAIN WITH RIGHT-SIDED SCIATICA: ICD-10-CM

## 2018-09-27 PROCEDURE — 99282 EMERGENCY DEPT VISIT SF MDM: CPT | Performed by: EMERGENCY MEDICINE

## 2018-09-27 PROCEDURE — 99283 EMERGENCY DEPT VISIT LOW MDM: CPT | Mod: Z6 | Performed by: EMERGENCY MEDICINE

## 2018-09-27 ASSESSMENT — ENCOUNTER SYMPTOMS
BACK PAIN: 1
DIFFICULTY URINATING: 0
NECK STIFFNESS: 0
ABDOMINAL PAIN: 0
ARTHRALGIAS: 0
SHORTNESS OF BREATH: 0
FEVER: 0
COLOR CHANGE: 0
HEADACHES: 0
CONFUSION: 0
EYE REDNESS: 0

## 2018-09-27 NOTE — ED PROVIDER NOTES
History     Chief Complaint   Patient presents with     Back Pain     low back pain for the past 4 months that radiates down the right leg. Denies any trauma     HPI  Cindy Espinoza is a 58 year old female with a history of chronic low back pain who presents for evaluation of low back pain with radiation down right leg.  Patient states that her pain has worsened over the last several days.  This is the same pain that she has had off and on for more than 4 months.  She states that it is a dull pain that is at times worse with certain movements.  She has been using Tylenol intermittently and notes that in the past physical therapy has helped.  However, she is too busy currently to follow-up with physical therapy or her primary physician and so presents to the emergency room.  She denies new or other injury or trauma causing her pain.  She denies bowel or bladder dysfunction or inability to walk.  She likewise denies other ongoing symptoms.    I have reviewed the Medications, Allergies, Past Medical and Surgical History, and Social History in the Epic system.    Review of Systems   Constitutional: Negative for fever.   HENT: Negative for congestion.    Eyes: Negative for redness.   Respiratory: Negative for shortness of breath.    Cardiovascular: Negative for chest pain.   Gastrointestinal: Negative for abdominal pain.   Genitourinary: Negative for difficulty urinating.   Musculoskeletal: Positive for back pain. Negative for arthralgias and neck stiffness.   Skin: Negative for color change.   Neurological: Negative for headaches.   Psychiatric/Behavioral: Negative for confusion.       Physical Exam   BP: 116/68  Pulse: 73  Temp: 97.7  F (36.5  C)  Resp: 16  Weight: 72.6 kg (160 lb)  SpO2: 94 %      Physical Exam   Constitutional: No distress.   HENT:   Head: Atraumatic.   Mouth/Throat: Oropharynx is clear and moist. No oropharyngeal exudate.   Eyes: Pupils are equal, round, and reactive to light. No scleral icterus.    Cardiovascular: Normal heart sounds and intact distal pulses.    Pulmonary/Chest: Breath sounds normal. No respiratory distress.   Abdominal: Soft. Bowel sounds are normal. There is no tenderness.   Musculoskeletal: She exhibits no edema.        Lumbar back: She exhibits tenderness and pain. She exhibits normal range of motion, no bony tenderness, no swelling, no edema, no deformity, no laceration, no spasm and normal pulse.        Back:    Skin: Skin is warm. No rash noted. She is not diaphoretic.       ED Course     ED Course     Procedures             Labs Ordered and Resulted from Time of ED Arrival Up to the Time of Departure from the ED - No data to display         Assessments & Plan (with Medical Decision Making)   58-year-old female who presents with recurrence of worsening lower back pain with radiation down right leg.  Her exam is largely unremarkable with exception of tenderness to palpation throughout her lower back.  She has had success in the past with physical therapy.  I do not suspect any new or serious pathology such as cauda equina, epidural abscess.  I have recommended scheduled use of Tylenol, physical therapy and follow-up with primary physician.    I have reviewed the nursing notes.    I have reviewed the findings, diagnosis, plan and need for follow up with the patient.    Discharge Medication List as of 9/27/2018  7:47 AM          Final diagnoses:   Chronic bilateral low back pain with right-sided sciatica       9/27/2018   [facility], [place], EMERGENCY DEPARTMENT     Alvin Ocasio MD  09/27/18 0759

## 2018-09-27 NOTE — DISCHARGE INSTRUCTIONS

## 2018-09-27 NOTE — ED AVS SNAPSHOT
[facility], Emergency Department    [address]    [place] [address]    Phone:  [phone]    Fax:  [phone]                                       Cindy Espinoza   MRN: [id number]    Department:  [facility], Emergency Department   Date of Visit:  9/27/2018           Patient Information     Date Of Birth          1960        Your diagnoses for this visit were:     Chronic bilateral low back pain with right-sided sciatica        You were seen by Alvin Ocasio MD.      Follow-up Information     Follow up with Walter Kulkarni MD.    Specialty:  Student in organized health care education/training program    Contact information:    [facility]  [address]  [facility] [address]  [phone]          Discharge Instructions         General Neck and Back Pain    Both neck and back pain are usually caused by injury to the muscles or ligaments of the spine. Sometimes the disks that separate each bone of the spine may cause pain by pressing on a nearby nerve. Back and neck pain may appear after a sudden twisting or bending force (such as in a car accident), or sometimes after a simple awkward movement. In either case, muscle spasm is often present and adds to the pain.  Acute neck and back pain usually gets better in 1 to 2 weeks. Pain related to disk disease, arthritis in the spinal joints or spinal stenosis (narrowing of the spinal canal) can become chronic and last for months or years.  Back and neck pain are common problems. Most people feel better in 1 or 2 weeks, and most of the rest in 1 to 2 months. Most people can remain active.  People have and describe pain differently.    Pain can be sharp, stabbing, shooting, aching, cramping, or burning    Movement, standing, bending, lifting, sitting, or walking may worsen the pain    Pain can be localized to one spot or area, or it can be more generalized    Pain can spread or radiate upwards, downwards, to the front, or  go down your arms    Muscle spasm may occur.  Most of the time mechanical problems with the muscles or spine cause the pain. it is usually caused by an injury, whether known or not, to the muscles or ligaments. While illnesses can cause back pain, it is usually not caused by a serious illness. Pain is usually related to physical activity, whether sports, exercise, work, or normal activity. Sometimes it can occur without an identifiable cause. This can happen simply by stretching or moving wrong, without noting pain at the time. Other causes include:    Overexertion, lifting, pushing, pulling incorrectly or too aggressively.    Sudden twisting, bending or stretching from an accident (car or fall), or accidental movement.    Poor posture    Poor conditioning, lack of regular exercise    Spinal disc disease or arthritis    Stress    Pregnancy, or illness like appendicitis, bladder or kidney infection, pelvic infections   Home care    For neck pain: Use a comfortable pillow that supports the head and keeps the spine in a neutral position. The position of the head should not be tilted forward or backward.    When in bed, try to find a position of comfort. A firm mattress is best. Try lying flat on your back with pillows under your knees. You can also try lying on your side with your knees bent up towards your chest and a pillow between your knees.    At first, do not try to stretch out the sore spots. If there is a strain, it is not like the good soreness you get after exercising without an injury. In this case, stretching may make it worse.    Don't sit for long periods, as in long car rides or other travel. This puts more stress on the lower back than standing or walking.    During the first 24 to 72 hours after an injury, apply an ice pack to the painful area for 20 minutes and then remove it for 20 minutes over a period of 60 to 90 minutes or several times a day.     You can alternate ice and heat therapies. Talk  with your healthcare provider about the best treatment for your back or neck pain. As a safety precaution, do not use a heating pad at bedtime. Sleeping with a heating pad can lead to skin burns or tissue damage.    Therapeutic massage can help relax the back and neck muscles without stretching them.    Be aware of safe lifting methods and do not lift anything over 15 pounds until all the pain is gone.  Medicines  Talk to your healthcare provider before using medicine, especially if you have other medical problems or are taking other medicines.    You may use over-the-counter medicine to control pain, unless another pain medicine was prescribed. If you have chronic conditions like diabetes, liver or kidney disease, stomach ulcers,  gastrointestinal bleeding, or are taking blood thinner medicines.    Be careful if you are given pain medicines, narcotics, or medicine for muscle spasm. They can cause drowsiness, and can affect your coordination, reflexes, and judgment. Do not drive or operate heavy machinery.  Follow-up care  Follow up with your healthcare provider, or as advised. Physical therapy or further tests may be needed.  If X-rays were taken, you will be notified of any new findings that may affect your care.  Call 911  Call 911 if any of the following occur:    Trouble breathing    Confusion    Very drowsy or trouble awakening    Fainting or loss of consciousness    Rapid or very slow heart rate    Loss of bowel or bladder control  When to seek medical advice  Call your healthcare provider right away if any of these occur:    Pain becomes worse or spreads into your arms or legs    Weakness, numbness or pain in one or both arms or legs    Numbness in the groin area    Difficulty walking    Fever of 100.4 F (38 C) or higher, or as directed by your healthcare provider  Date Last Reviewed: 7/1/2016 2000-2017 [employer]. [address] [place] [address]. All rights reserved. This  information is not intended as a substitute for professional medical care. Always follow your healthcare professional's instructions.          Your next 10 appointments already scheduled     Oct 01, 2018  2:00 PM CDT   (Arrive by 1:45 PM)   New Patient Visit with Edith Le MD   [facility] Nose [facility] Throat ([facility])    [address]  [address]  [facility] [id number] [phone]            Oct 02, 2018  2:00 PM CDT   [facility] Draw with  [facility]   [facility] Draw ([facility])    [address]  [address]  [facility] [id number] [phone]            Oct 02, 2018  3:00 PM CDT   Infusion 60 with UC SPEC INFUSION, UC 46 ATC   [facility] [place] [facility] ([facility])    [address]  [address]  [facility] [id number] [phone]              24 Hour Appointment Hotline       To make an appointment at any [facility], call [phone] ([phone]). If you don't have a family doctor or clinic, we will help you find one. [facility] clinics are conveniently located to serve the needs of you and your family.             Review of your medicines      Our records show that you are taking the medicines listed below. If these are incorrect, please call your family doctor or clinic.        Dose / Directions Last dose taken    acetaminophen 500 MG tablet   Commonly known as:  TYLENOL   Dose:  1000 mg   Quantity:  100 tablet        Take 2 tablets (1,000 mg) by mouth every 8 hours as needed for pain   Refills:  11        amoxicillin-clavulanate 875-125 MG per tablet   Commonly known as:  AUGMENTIN   Dose:  1 tablet   Quantity:  20 tablet        Take 1 tablet by mouth 2 times daily   Refills:  0        Calcium-D 600-400 MG-UNIT Tabs   Quantity:  180 tablet        TAKE 1 TAB BY MOUTH TWICE DAILY   Refills:  3        capsaicin 0.025 % Crea cream    Commonly known as:  ZOSTRIX   Dose:  1 g   Quantity:  45 g        Apply 1 g topically 3 times daily as needed Wash hands with soap and water after applications.   Refills:  3        ibuprofen 600 MG tablet   Commonly known as:  ADVIL/MOTRIN   Dose:  600 mg   Quantity:  90 tablet        Take 1 tablet (600 mg) by mouth every 6 hours as needed for moderate pain   Refills:  1        multivitamin, therapeutic with minerals Tabs tablet   Dose:  1 tablet   Quantity:  100 tablet        Take 1 tablet by mouth daily   Refills:  3        omega 3 1000 MG Caps   Dose:  1 g   Quantity:  90 capsule        Take 1 g by mouth daily   Refills:  0        polyethylene glycol powder   Commonly known as:  MIRALAX   Dose:  1 capful   Quantity:  510 g        Take 17 g (1 capful) by mouth daily   Refills:  1        senna-docusate 8.6-50 MG per tablet   Commonly known as:  SENOKOT-S;PERICOLACE   Dose:  2 tablet   Quantity:  60 tablet        Take 2 tablets by mouth 2 times daily   Refills:  1        vitamin B-Complex   Dose:  1 tablet   Quantity:  90 tablet        Take 1 tablet by mouth daily   Refills:  3        vitamin D 2000 units tablet   Dose:  2000 Units   Quantity:  100 tablet        Take 2,000 Units by mouth daily   Refills:  3                Orders Needing Specimen Collection     None      Pending Results     No orders found from 9/25/2018 to 9/28/2018.            Pending Culture Results     No orders found from 9/25/2018 to 9/28/2018.            Pending Results Instructions     If you had any lab results that were not finalized at the time of your Discharge, you can call the ED Lab Result RN at [phone]. You will be contacted by this team for any positive Lab results or changes in treatment. The nurses are available 7 days a week from 10A to 6:30P.  You can leave a message 24 hours per day and they will return your call.        Thank you for choosing [facility]       Thank you for choosing [facility] for your care. Our goal is  "always to provide you with excellent care. Hearing back from our patients is one way we can continue to improve our services. Please take a few minutes to complete the written survey that you may receive in the mail after you visit with us. Thank you!        [employer] Information     [employer] lets you send messages to your doctor, view your test results, renew your prescriptions, schedule appointments and more. To sign up, go to [email or web address].[email or web address] . Click on \"Log in\" on the left side of the screen, which will take you to the Welcome page. Then click on \"Sign up Now\" on the right side of the page.     You will be asked to enter the access code listed below, as well as some personal information. Please follow the directions to create your username and password.     Your access code is: [email or web address]  Expires: 2018  6:30 AM     Your access code will  in 90 days. If you need help or a new code, please call your [place] clinic or [phone].        Care EveryWhere ID     This is your Care EveryWhere ID. This could be used by other organizations to access your [place] medical records  [id number]        Equal Access to Services     SIERRA BELTRE : Anabel oHng, waishmael champion, lakeisha albert, cooper lind. So [facility] [phone].    ATENCIÓN: Si habla español, tiene a lopez disposición servicios gratuitos de asistencia lingüística. Cl al [phone].    We comply with applicable federal civil rights laws and [place] laws. We do not discriminate on the basis of race, color, national origin, age, disability, sex, sexual orientation, or gender identity.            After Visit Summary       This is your record. Keep this with you and show to your community pharmacist(s) and doctor(s) at your next visit.                  "

## 2018-09-27 NOTE — ED AVS SNAPSHOT
[facility], [place], Emergency Department    [address]    [facility] [address]    Phone:  [phone]    Fax:  [phone]                                       Cindy Espinoza   MRN: [id number]    Department:  [facility], Emergency Department   Date of Visit:  9/27/2018           After Visit Summary Signature Page     I have received my discharge instructions, and my questions have been answered. I have discussed any challenges I see with this plan with the nurse or doctor.    ..........................................................................................................................................  Patient/Patient Representative Signature      ..........................................................................................................................................  Patient Representative Print Name and Relationship to Patient    ..................................................               ................................................  Date                                   Time    ..........................................................................................................................................  Reviewed by Signature/Title    ...................................................              ..............................................  Date                                               Time          22EPIC Rev 08/18

## 2018-10-01 ENCOUNTER — PRE VISIT (OUTPATIENT)
Dept: OTOLARYNGOLOGY | Facility: CLINIC | Age: 58
End: 2018-10-01

## 2018-10-01 ENCOUNTER — OFFICE VISIT (OUTPATIENT)
Dept: OTOLARYNGOLOGY | Facility: CLINIC | Age: 58
End: 2018-10-01
Attending: INTERNAL MEDICINE
Payer: COMMERCIAL

## 2018-10-01 VITALS — HEIGHT: 63 IN | BODY MASS INDEX: 28.17 KG/M2 | WEIGHT: 159 LBS

## 2018-10-01 DIAGNOSIS — E21.3 HYPERPARATHYROIDISM (H): Primary | ICD-10-CM

## 2018-10-01 RX ORDER — ZOLEDRONIC ACID 5 MG/100ML
5 INJECTION, SOLUTION INTRAVENOUS ONCE
Status: CANCELLED
Start: 2018-10-01 | End: 2018-10-01

## 2018-10-01 ASSESSMENT — PAIN SCALES - GENERAL: PAINLEVEL: NO PAIN (0)

## 2018-10-01 NOTE — LETTER
10/1/2018       RE: Cindy Espinoza  [address]   [facility] [address]     Dear Colleague,    Thank you for referring your patient, Cindy Espinoza, to the [facility] EAR NOSE AND THROAT at [facility]. Please see a copy of my visit note below.    Service Date: 10/01/2018      Greater than 50% of this 30 minute visit was spent in the care and consultation of this patient for surgical options for hyperparathyroidism.      This visit was carried out with an  present.      I was referred to this patient by Dr. Hoskins.      HISTORY OF PRESENT ILLNESS:  This is a 58-year-old female who has had known hyperparathyroidism since at least 2012.  She was diagnosed with hypercalcemia at that time as well as an elevated parathyroid hormone.  A 24 hour urine calcium was not significantly elevated at 180 mg per 24 hours.      An ultrasound performed in 2012 identified a 2.8 x 0.4 x 1.4 cm hypoechoic lesion superior to the thyroid in the left neck.  A nuclear medicine scan was not performed.        The patient's symptoms associated with hyperparathyroidism include osteoporosis.  There is no history of nephrolithiasis.  She denies any constipation, dyspepsia or mental status changes.  She has no joint or muscle aches.  There is no family history or previous history of thyroid or parathyroid disease.      PAST MEDICAL HISTORY, SURGICAL HISTORY AND MEDICATIONS:  Reviewed and are available in the EMR.      REVIEW OF SYSTEMS:  A 10 point review of systems is pertinent for that noted in the HPI.      PHYSICAL EXAMINATION:  Her neck is soft.  The thyroid gland is within normal limits, and there are no palpable masses in the neck.      Most recent laboratory data from 09/07/2018 include a parathyroid hormone level 108, calcium 9.5, phosphorus 2.7, vitamin D 28.  There is no albumin drawn with this most recent calcium.  There is not a 24 hour urine calcium.      Radiographic images  include a nuclear medicine scan that does not localize a parathyroid adenoma.  DEXA scan is consistent with osteoporosis, primarily in the lumbar spine.      ASSESSMENT:  Elevated parathyroid hormone with a normal calcium.      PLAN:  First, I would like to repeat calcium, vitamin D and PTH.  I believe Dr. Hoskins has that ordered.  If the patient remains with only a mildly elevated parathyroid hormone, I think it is reasonable to continue monitoring this patient.  However, if she does have elevated calcium, vitamin D and PTH with a normal albumin and an elevated 24 hour urine calcium, this patient would then be diagnosed with primary hyperparathyroidism.  If that is the case, then I would recommend surgery.  I discussed with the patient the surgical procedure, including a neck exploration and resection of parathyroid adenoma.  We discussed the risks, including, but not limited to, bleeding, infection, injury to the recurrent laryngeal nerve or nerves, potential permanent hypocalcemia or missed parathyroid adenoma.      At the conclusion of the meeting, the patient stated that she would like to discuss with her family if they would like to proceed with surgery.  She will contact our office with any further questions.         SHAWNA WHITE MD             D: 10/14/2018   T: 10/15/2018   MT: vielka      Name:     BENITA DUENAS   MRN:      [id number]        Account:      [id number]   :      1960           Service Date: 10/01/2018      Document: [id number]

## 2018-10-01 NOTE — NURSING NOTE
Teaching Flowsheet - ENT   Person(s) involved in teaching:  Patient     Motivation Level:  Asks Questions:   Yes  Eager to Learn:   Yes  Cooperative:   Yes  Receptive (willing/able to accept information):   Yes  Comments: Reviewed pre-op H and P,  NPO prior to  surgery,  pre-op scrub (given Hibiclens)  Reviewed post-op  cares including; wound care, activity and pain.     Patient demonstrates understanding of the following:  Reason for the appointment, diagnosis and treatment plan:   Yes  Knowledge of proper use of medications and conditions for which they are ordered (with special attention to potential side effects or drug interactions):  stop aspirin products 1 week before surgery Yes  Which situations necessitate calling provider and whom to contact:   Yes  Nutritional needs and diet plan:   Yes  Pain management techniques:   Yes  Patient instructed on hand hygiene:  Yes  How and/when to access community resources:   Yes     Infection Prevention:  Patient   demonstrates understanding of the following:  Surgical procedure site care taught Yes  Signs and symptoms of infection taught Yes  Wound care taught Yes  Instructional Materials Used/Given: pre- op booklet, verbal  Instruction.  Kecia HENDERSON RNCC

## 2018-10-01 NOTE — PATIENT INSTRUCTIONS
BEFORE SURGERY:    -NO IBUPROFEN , MOTRIN, ALEVE, GARLIC SUPPLEMENTS, ASPIRIN PRODUCTS  OR FISH OIL FOR 7 DAYS PRIOR TO SURGERY. Tylenol is fine, generally.( You will need specific instructions from primary care if on blood thinners).    -Read pre-operative pamphlets/ booklets, call or contact us with questions.    - Pre-op History and Physical to be done by primary care physician within 30 days of surgery date. This form is in the packet.    - Restrictions on food and fluid the day of surgery as per packet. Generally, nothing solid to eat 8 hours prior to the procedure. Okay to have clear liquids up to 2 hours before (this includes; water, apple juice, black coffee without any cream or sugar).    - Pre-op scrub, neck down, directions are in your packet. Use the night before surgery and the day of surgery.    - You will need a  the day of surgery.        AFTER SURGERY:    - You will follow up after surgery in approximately 3 week(s).    - Call [phone] for scheduling or general questions     For urgent needs after hours call [phone]. You will speak with the hospital  and should ask to have the ENT resident on call paged.    - Please contact our clinic if you note any of the following:          -drainage soaking 3 cotton balls in one hour for 3 consecutive hours          -persistent pain after pain medication          -Fever>100 degrees x 24 hours or longer          -Significant dizziness, especially of new onset          -Any questions or concerns about your care    Kecia HENDERSON RNCC [phone]

## 2018-10-01 NOTE — MR AVS SNAPSHOT
After Visit Summary   10/1/2018    Cindy Espinoza    MRN: [id number]           Patient Information     Date Of Birth          1960        Visit Information        Provider Department      10/1/2018 1:45 PM Edith Le MD; [facility] Ear Nose and Throat        Care Instructions      BEFORE SURGERY:    -NO IBUPROFEN , MOTRIN, ALEVE, GARLIC SUPPLEMENTS, ASPIRIN PRODUCTS  OR FISH OIL FOR 7 DAYS PRIOR TO SURGERY. Tylenol is fine, generally.( You will need specific instructions from primary care if on blood thinners).    -Read pre-operative pamphlets/ booklets, call or contact us with questions.    - Pre-op History and Physical to be done by primary care physician within 30 days of surgery date. This form is in the packet.    - Restrictions on food and fluid the day of surgery as per packet. Generally, nothing solid to eat 8 hours prior to the procedure. Okay to have clear liquids up to 2 hours before (this includes; water, apple juice, black coffee without any cream or sugar).    - Pre-op scrub, neck down, directions are in your packet. Use the night before surgery and the day of surgery.    - You will need a  the day of surgery.        AFTER SURGERY:    - You will follow up after surgery in approximately 3 week(s).    - Call [phone] for scheduling or general questions     For urgent needs after hours call [phone]. You will speak with the hospital  and should ask to have the ENT resident on call paged.    - Please contact our clinic if you note any of the following:          -drainage soaking 3 cotton balls in one hour for 3 consecutive hours          -persistent pain after pain medication          -Fever>100 degrees x 24 hours or longer          -Significant dizziness, especially of new onset          -Any questions or concerns about your care    Kecia HENDERSON RNCC [phone]          Follow-ups after your visit        Your next 10 appointments  "already scheduled     Oct 02, 2018  2:00 PM CDT   Masonic Lab Draw with UC MASONIC LAB DRAW   [facility] Masonic Lab Draw ([facility])    [address]  [address]  [place] [address] [phone]            Oct 02, 2018  3:00 PM CDT   Infusion 60 with UC SPEC INFUSION, UC 46 ATC   [facility] [place] Specialty and Procedure ([facility])    [address]  [address]  [place] [address] [phone]              Who to contact     Please call your clinic at [phone] to:    Ask questions about your health    Make or cancel appointments    Discuss your medicines    Learn about your test results    Speak to your doctor            Additional Information About Your Visit        Dashbookhart Information     Saranas is an electronic gateway that provides easy, online access to your medical records. With Saranas, you can request a clinic appointment, read your test results, renew a prescription or communicate with your care team.     To sign up for Saranas visit the website at [email or web address]   You will be asked to enter the access code listed below, as well as some personal information. Please follow the directions to create your username and password.     Your access code is: [email or web address]  Expires: 2018  6:30 AM     Your access code will  in 90 days. If you need help or a new code, please contact your [facility] or call [phone] for assistance.        Care EveryWhere ID     This is your Care EveryWhere ID. This could be used by other organizations to access your [facility] medical records  [id number]        Your Vitals Were     Height BMI (Body Mass Index)                1.6 m (5' 3\") 28.17 kg/m2           Blood Pressure from Last 3 Encounters:   18 116/68   09/10/18 122/81   18 110/74    Weight from Last 3 Encounters:   10/01/18 72.1 kg (159 lb)   18 72.6 " kg (160 lb)   09/10/18 72.1 kg (159 lb)              Today, you had the following     No orders found for display       Primary Care Provider Office Phone # Fax #    Walter Kulkarni [phone]       [facility] [address]   [facility] [address]        Equal Access to Services     SIERRA Diamond Grove CenterREBECCA : Hadii aad ku hadasho Soomaali, waaxda luqadaha, qaybta kaalmada adeegyada, waxay idiin hayaan adejames masters laklaus . So [facility] [phone].    ATENCIÓN: Si habla español, tiene a lopez disposición servicios gratuitos de asistencia lingüística. Cl al [phone].    We comply with applicable federal civil rights laws and [place] laws. We do not discriminate on the basis of race, color, national origin, age, disability, sex, sexual orientation, or gender identity.            Thank you!     Thank you for choosing [facility] EAR NOSE AND THROAT  for your care. Our goal is always to provide you with excellent care. Hearing back from our patients is one way we can continue to improve our services. Please take a few minutes to complete the written survey that you may receive in the mail after your visit with us. Thank you!             Your Updated Medication List - Protect others around you: Learn how to safely use, store and throw away your medicines at www.disposemymeds.org.          This list is accurate as of 10/1/18  2:26 PM.  Always use your most recent med list.                   Brand Name Dispense Instructions for use Diagnosis    acetaminophen 500 MG tablet    TYLENOL    100 tablet    Take 2 tablets (1,000 mg) by mouth every 8 hours as needed for pain    Chronic bilateral low back pain with right-sided sciatica       amoxicillin-clavulanate 875-125 MG per tablet    AUGMENTIN    20 tablet    Take 1 tablet by mouth 2 times daily    Acute non-recurrent maxillary sinusitis       Calcium-D 600-400 MG-UNIT Tabs     180 tablet    TAKE 1 TAB BY MOUTH TWICE DAILY    Age-related  osteoporosis without current pathological fracture       capsaicin 0.025 % Crea cream    ZOSTRIX    45 g    Apply 1 g topically 3 times daily as needed Wash hands with soap and water after applications.    Chronic bilateral low back pain with right-sided sciatica       ibuprofen 600 MG tablet    ADVIL/MOTRIN    90 tablet    Take 1 tablet (600 mg) by mouth every 6 hours as needed for moderate pain    Lumbar back pain with radiculopathy affecting right lower extremity       multivitamin, therapeutic with minerals Tabs tablet     100 tablet    Take 1 tablet by mouth daily    Fatigue, unspecified type       omega 3 1000 MG Caps     90 capsule    Take 1 g by mouth daily    Health maintenance alteration       polyethylene glycol powder    MIRALAX    510 g    Take 17 g (1 capful) by mouth daily    Slow transit constipation       senna-docusate 8.6-50 MG per tablet    SENOKOT-S;PERICOLACE    60 tablet    Take 2 tablets by mouth 2 times daily    Constipation, unspecified constipation type       vitamin B-Complex     90 tablet    Take 1 tablet by mouth daily    Chronic bilateral low back pain with right-sided sciatica       vitamin D 2000 units tablet     100 tablet    Take 2,000 Units by mouth daily    Vitamin D deficiency

## 2018-10-02 ENCOUNTER — APPOINTMENT (OUTPATIENT)
Dept: LAB | Facility: CLINIC | Age: 58
End: 2018-10-02
Attending: INTERNAL MEDICINE
Payer: COMMERCIAL

## 2018-10-02 ENCOUNTER — INFUSION THERAPY VISIT (OUTPATIENT)
Dept: INFUSION THERAPY | Facility: CLINIC | Age: 58
End: 2018-10-02
Attending: INTERNAL MEDICINE
Payer: COMMERCIAL

## 2018-10-02 VITALS
OXYGEN SATURATION: 96 % | RESPIRATION RATE: 16 BRPM | DIASTOLIC BLOOD PRESSURE: 63 MMHG | HEART RATE: 84 BPM | TEMPERATURE: 97.8 F | SYSTOLIC BLOOD PRESSURE: 97 MMHG

## 2018-10-02 DIAGNOSIS — M81.0 AGE-RELATED OSTEOPOROSIS WITHOUT CURRENT PATHOLOGICAL FRACTURE: Primary | ICD-10-CM

## 2018-10-02 LAB
CALCIUM SERPL-MCNC: 9.8 MG/DL (ref 8.5–10.1)
CREAT SERPL-MCNC: 0.52 MG/DL (ref 0.52–1.04)
GFR SERPL CREATININE-BSD FRML MDRD: >90 ML/MIN/1.7M2

## 2018-10-02 PROCEDURE — 25000128 H RX IP 250 OP 636: Mod: ZF | Performed by: INTERNAL MEDICINE

## 2018-10-02 PROCEDURE — 96365 THER/PROPH/DIAG IV INF INIT: CPT

## 2018-10-02 PROCEDURE — 82310 ASSAY OF CALCIUM: CPT | Performed by: INTERNAL MEDICINE

## 2018-10-02 PROCEDURE — 82565 ASSAY OF CREATININE: CPT | Performed by: INTERNAL MEDICINE

## 2018-10-02 RX ORDER — ZOLEDRONIC ACID 5 MG/100ML
5 INJECTION, SOLUTION INTRAVENOUS ONCE
Status: COMPLETED | OUTPATIENT
Start: 2018-10-02 | End: 2018-10-02

## 2018-10-02 RX ADMIN — SODIUM CHLORIDE 50 ML: 9 INJECTION, SOLUTION INTRAVENOUS at 16:36

## 2018-10-02 RX ADMIN — ZOLEDRONIC ACID 5 MG: 0.05 INJECTION, SOLUTION INTRAVENOUS at 16:15

## 2018-10-02 ASSESSMENT — PAIN SCALES - GENERAL: PAINLEVEL: MILD PAIN (2)

## 2018-10-02 NOTE — MR AVS SNAPSHOT
After Visit Summary   10/2/2018    Cindy Espinoza    MRN: [id number]           Patient Information     Date Of Birth          1960        Visit Information        Provider Department      10/2/2018 3:00 PM [place] [facility];  46 ATC; UC SPEC INFUSION [facility] Specialty [facility] Procedure        Today's Diagnoses     Age-related osteoporosis without current pathological fracture    -  1      Care Instructions    Dear Cindy Espinoza    Thank you for choosing [facility] ([facility]) for your infusion.  The following information is a summary of our appointment as well as important reminders.      Zoledronic Acid injection (Paget's Disease, Osteoporosis)  Brand Names: Reclast, Zometa  What is this medicine?  ZOLEDRONIC ACID (BENNETT le dron ik AS id) lowers the amount of calcium loss from bone. It is used to treat Paget's disease and osteoporosis in women.  How should I use this medicine?  This medicine is for infusion into a vein. It is given by a health care professional in a hospital or clinic setting.  Talk to your pediatrician regarding the use of this medicine in children. This medicine is not approved for use in children.  What side effects may I notice from receiving this medicine?  Side effects that you should report to your doctor or health care professional as soon as possible:    allergic reactions like skin rash, itching or hives, swelling of the face, lips, or tongue    anxiety, confusion, or depression    breathing problems    changes in vision    eye pain    feeling faint or lightheaded, falls    jaw pain, especially after dental work    mouth sores    muscle cramps, stiffness, or weakness    redness, blistering, peeling or loosening of the skin, including inside the mouth    trouble passing urine or change in the amount of urine  Side effects that usually do not require medical attention (report to  your doctor or health care professional if they continue or are bothersome):    bone, joint, or muscle pain    constipation    diarrhea    fever    hair loss    irritation at site where injected    loss of appetite    nausea, vomiting    stomach upset    trouble sleeping    trouble swallowing    weak or tired  What may interact with this medicine?    certain antibiotics given by injection    NSAIDs, medicines for pain and inflammation, like ibuprofen or naproxen    some diuretics like bumetanide, furosemide    teriparatide    What if I miss a dose?  It is important not to miss your dose. Call your doctor or health care professional if you are unable to keep an appointment.  Where should I keep my medicine?  This drug is given in a hospital or clinic and will not be stored at home.  What should I tell my health care provider before I take this medicine?  They need to know if you have any of these conditions:    aspirin-sensitive asthma    cancer, especially if you are receiving medicines used to treat cancer    dental disease or wear dentures    infection    kidney disease    low levels of calcium in the blood    past surgery on the parathyroid gland or intestines    receiving corticosteroids like dexamethasone or prednisone    an unusual or allergic reaction to zoledronic acid, other medicines, foods, dyes, or preservatives    pregnant or trying to get pregnant    breast-feeding  What should I watch for while using this medicine?  Visit your doctor or health care professional for regular checkups. It may be some time before you see the benefit from this medicine. Do not stop taking your medicine unless your doctor tells you to. Your doctor may order blood tests or other tests to see how you are doing.  Women should inform their doctor if they wish to become pregnant or think they might be pregnant. There is a potential for serious side effects to an unborn child. Talk to your health care professional or pharmacist  for more information.  You should make sure that you get enough calcium and vitamin D while you are taking this medicine. Discuss the foods you eat and the vitamins you take with your health care professional.  Some people who take this medicine have severe bone, joint, and/or muscle pain. This medicine may also increase your risk for jaw problems or a broken thigh bone. Tell your doctor right away if you have severe pain in your jaw, bones, joints, or muscles. Tell your doctor if you have any pain that does not go away or that gets worse.  Tell your dentist and dental surgeon that you are taking this medicine. You should not have major dental surgery while on this medicine. See your dentist to have a dental exam and fix any dental problems before starting this medicine. Take good care of your teeth while on this medicine. Make sure you see your dentist for regular follow-up appointments.  NOTE:This sheet is a summary. It may not cover all possible information. If you have questions about this medicine, talk to your doctor, pharmacist, or health care provider. Copyright  2018 Elsevier        We look forward in seeing you on your next appointment here at [facility].  Please don t hesitate to call us at [phone] to reschedule any of your appointments or to speak with one of the [facility] registered nurses.  It was a pleasure taking care of you today.    Sincerely,    [facility]  Specialty Infusion [facility] Procedure Center  [address] [place] [address]  [address]  Phone:  [phone]                    Follow-ups after your visit        Your next 10 appointments already scheduled     Oct 03, 2018 10:20 AM CDT   Return Visit with Shad Chadwick MD   [facility] ([facility])    [address],  [address]  [facility] [address]   [phone]              Who to contact     If you have questions or need follow up information about today's clinic visit or your  "schedule please contact [facility] SPECIALTY AND PROCEDURE directly at [phone].  Normal or non-critical lab and imaging results will be communicated to you by MyChart, letter or phone within 4 business days after the clinic has received the results. If you do not hear from us within 7 days, please contact the clinic through StarGenhart or phone. If you have a critical or abnormal lab result, we will notify you by phone as soon as possible.  Submit refill requests through I-lighting or call your pharmacy and they will forward the refill request to us. Please allow 3 business days for your refill to be completed.          Additional Information About Your Visit        [facility] Consumer Information     I-lighting lets you send messages to your doctor, view your test results, renew your prescriptions, schedule appointments and more. To sign up, go to [email or web address] . Click on \"Log in\" on the left side of the screen, which will take you to the Welcome page. Then click on \"Sign up Now\" on the right side of the page.     You will be asked to enter the access code listed below, as well as some personal information. Please follow the directions to create your username and password.     Your access code is: [email or web address]  Expires: 2018  6:30 AM     Your access code will  in 90 days. If you need help or a new code, please call your [place] clinic or [phone].        Care EveryWhere ID     This is your Care EveryWhere ID. This could be used by other organizations to access your [place] medical records  [id number]        Your Vitals Were     Pulse Temperature Pulse Oximetry             84 97.8  F (36.6  C) (Oral) 96%          Blood Pressure from Last 3 Encounters:   10/02/18 97/63   18 116/68   09/10/18 122/81    Weight from Last 3 Encounters:   10/01/18 72.1 kg (159 lb)   18 72.6 kg (160 lb)   09/10/18 72.1 kg (159 lb)              Today, you had the following     No orders found " for display       Primary Care Provider Office Phone # Fax #    Walter Kulkarni [phone]       [facility] [address]   [facility] [address]        Equal Access to Services     GYPSY BELTRE : Hadii aad ku hadkayodeo Sosherrie, wadeniseda luqadaha, qaybta kaalmada bety, cooper rivera abijames masters terry lind. So [facility] [phone].    ATENCIÓN: Si habla español, tiene a lopez disposición servicios gratuitos de asistencia lingüística. LlTri[facility] [phone].    We comply with applicable federal civil rights laws and [place] laws. We do not discriminate on the basis of race, color, national origin, age, disability, sex, sexual orientation, or gender identity.            Thank you!     Thank you for choosing [facility] SPECIALTY AND PROCEDURE  for your care. Our goal is always to provide you with excellent care. Hearing back from our patients is one way we can continue to improve our services. Please take a few minutes to complete the written survey that you may receive in the mail after your visit with us. Thank you!             Your Updated Medication List - Protect others around you: Learn how to safely use, store and throw away your medicines at www.disposemymeds.org.          This list is accurate as of 10/2/18  3:20 PM.  Always use your most recent med list.                   Brand Name Dispense Instructions for use Diagnosis    acetaminophen 500 MG tablet    TYLENOL    100 tablet    Take 2 tablets (1,000 mg) by mouth every 8 hours as needed for pain    Chronic bilateral low back pain with right-sided sciatica       amoxicillin-clavulanate 875-125 MG per tablet    AUGMENTIN    20 tablet    Take 1 tablet by mouth 2 times daily    Acute non-recurrent maxillary sinusitis       Calcium-D 600-400 MG-UNIT Tabs     180 tablet    TAKE 1 TAB BY MOUTH TWICE DAILY    Age-related osteoporosis without current pathological fracture       capsaicin 0.025 % Crea  cream    ZOSTRIX    45 g    Apply 1 g topically 3 times daily as needed Wash hands with soap and water after applications.    Chronic bilateral low back pain with right-sided sciatica       ibuprofen 600 MG tablet    ADVIL/MOTRIN    90 tablet    Take 1 tablet (600 mg) by mouth every 6 hours as needed for moderate pain    Lumbar back pain with radiculopathy affecting right lower extremity       multivitamin, therapeutic with minerals Tabs tablet     100 tablet    Take 1 tablet by mouth daily    Fatigue, unspecified type       omega 3 1000 MG Caps     90 capsule    Take 1 g by mouth daily    Health maintenance alteration       polyethylene glycol powder    MIRALAX    510 g    Take 17 g (1 capful) by mouth daily    Slow transit constipation       senna-docusate 8.6-50 MG per tablet    SENOKOT-S;PERICOLACE    60 tablet    Take 2 tablets by mouth 2 times daily    Constipation, unspecified constipation type       vitamin B-Complex     90 tablet    Take 1 tablet by mouth daily    Chronic bilateral low back pain with right-sided sciatica       vitamin D 2000 units tablet     100 tablet    Take 2,000 Units by mouth daily    Vitamin D deficiency

## 2018-10-02 NOTE — PROGRESS NOTES
Nursing Note  Cindy Espinoza [facility] for:   Chief Complaint   Patient presents with     Infusion     Reclast     During today's [facility] appointment, orders from Dr. Hoskins were completed.  Frequency: once    Progress note:  Patient identification verified by name and date of birth.  Assessment completed.  Vitals recorded in Doc Flowsheets.  Patient was provided with education regarding infusion and possible side effects.  Patient verbalized understanding.      needed: Yes, present for visit.  Premedications: were not ordered.  Infusion Rates: 400 ml/hr  Approximate Infusion length:15 minutes.   Labs: were drawn per orders.   Vascular access: peripheral IV placed today.  Treatment Conditions: patient s Creatinine Clearance is within paramaters to administer medication per orders.  Patient tolerated infusion: well. Patient observed for 15 minutes post infusion.     Discharge Plan:   AVS given to patient.   Follow up plan of care with: primary medical doctor.  Discharge instructions were reviewed with patient.  Patient/representative verbalized understanding of discharge instructions and all questions answered.  Patient discharged from [facility] in stable condition.    Emily Dan RN    Administrations This Visit     0.9% sodium chloride BOLUS     Admin Date Action Dose Route Administered By             10/02/2018 New Bag 50 mL Intravenous Emily Dan RN                    zoledronic Acid (RECLAST) infusion 5 mg     Admin Date Action Dose Rate Route Administered By          10/02/2018 New Bag 5 mg 400 mL/hr Intravenous Emily Dan RN                        BP 97/63  Pulse 84  Temp 97.8  F (36.6  C) (Oral)  SpO2 96%

## 2018-10-02 NOTE — PATIENT INSTRUCTIONS
Dear Cindy Espinoza    Thank you for choosing [facility] ([facility]) for your infusion.  The following information is a summary of our appointment as well as important reminders.      Zoledronic Acid injection (Paget's Disease, Osteoporosis)  Brand Names: Reclast, Zometa  What is this medicine?  ZOLEDRONIC ACID (BENNETT le dron ik AS id) lowers the amount of calcium loss from bone. It is used to treat Paget's disease and osteoporosis in women.  How should I use this medicine?  This medicine is for infusion into a vein. It is given by a health care professional in a hospital or clinic setting.  Talk to your pediatrician regarding the use of this medicine in children. This medicine is not approved for use in children.  What side effects may I notice from receiving this medicine?  Side effects that you should report to your doctor or health care professional as soon as possible:    allergic reactions like skin rash, itching or hives, swelling of the face, lips, or tongue    anxiety, confusion, or depression    breathing problems    changes in vision    eye pain    feeling faint or lightheaded, falls    jaw pain, especially after dental work    mouth sores    muscle cramps, stiffness, or weakness    redness, blistering, peeling or loosening of the skin, including inside the mouth    trouble passing urine or change in the amount of urine  Side effects that usually do not require medical attention (report to your doctor or health care professional if they continue or are bothersome):    bone, joint, or muscle pain    constipation    diarrhea    fever    hair loss    irritation at site where injected    loss of appetite    nausea, vomiting    stomach upset    trouble sleeping    trouble swallowing    weak or tired  What may interact with this medicine?    certain antibiotics given by injection    NSAIDs, medicines for pain and inflammation, like ibuprofen or  naproxen    some diuretics like bumetanide, furosemide    teriparatide    What if I miss a dose?  It is important not to miss your dose. Call your doctor or health care professional if you are unable to keep an appointment.  Where should I keep my medicine?  This drug is given in a hospital or clinic and will not be stored at home.  What should I tell my health care provider before I take this medicine?  They need to know if you have any of these conditions:    aspirin-sensitive asthma    cancer, especially if you are receiving medicines used to treat cancer    dental disease or wear dentures    infection    kidney disease    low levels of calcium in the blood    past surgery on the parathyroid gland or intestines    receiving corticosteroids like dexamethasone or prednisone    an unusual or allergic reaction to zoledronic acid, other medicines, foods, dyes, or preservatives    pregnant or trying to get pregnant    breast-feeding  What should I watch for while using this medicine?  Visit your doctor or health care professional for regular checkups. It may be some time before you see the benefit from this medicine. Do not stop taking your medicine unless your doctor tells you to. Your doctor may order blood tests or other tests to see how you are doing.  Women should inform their doctor if they wish to become pregnant or think they might be pregnant. There is a potential for serious side effects to an unborn child. Talk to your health care professional or pharmacist for more information.  You should make sure that you get enough calcium and vitamin D while you are taking this medicine. Discuss the foods you eat and the vitamins you take with your health care professional.  Some people who take this medicine have severe bone, joint, and/or muscle pain. This medicine may also increase your risk for jaw problems or a broken thigh bone. Tell your doctor right away if you have severe pain in your jaw, bones, joints, or  muscles. Tell your doctor if you have any pain that does not go away or that gets worse.  Tell your dentist and dental surgeon that you are taking this medicine. You should not have major dental surgery while on this medicine. See your dentist to have a dental exam and fix any dental problems before starting this medicine. Take good care of your teeth while on this medicine. Make sure you see your dentist for regular follow-up appointments.  NOTE:This sheet is a summary. It may not cover all possible information. If you have questions about this medicine, talk to your doctor, pharmacist, or health care provider. Copyright  2018 [employer]        We look forward in seeing you on your next appointment here at [facility].  Please don t hesitate to call us at [phone] to reschedule any of your appointments or to speak with one of the [facility] registered nurses.  It was a pleasure taking care of you today.    Sincerely,    [facility]  [address] [place] [address]  [id number]  Phone:  [phone]

## 2018-10-03 ENCOUNTER — OFFICE VISIT (OUTPATIENT)
Dept: FAMILY MEDICINE | Facility: CLINIC | Age: 58
End: 2018-10-03
Payer: COMMERCIAL

## 2018-10-03 ENCOUNTER — DOCUMENTATION ONLY (OUTPATIENT)
Dept: OTOLARYNGOLOGY | Facility: CLINIC | Age: 58
End: 2018-10-03

## 2018-10-03 VITALS
DIASTOLIC BLOOD PRESSURE: 82 MMHG | TEMPERATURE: 98.1 F | WEIGHT: 163.2 LBS | HEART RATE: 106 BPM | OXYGEN SATURATION: 100 % | SYSTOLIC BLOOD PRESSURE: 118 MMHG | RESPIRATION RATE: 16 BRPM | BODY MASS INDEX: 28.91 KG/M2

## 2018-10-03 DIAGNOSIS — R11.2 NAUSEA AND VOMITING, INTRACTABILITY OF VOMITING NOT SPECIFIED, UNSPECIFIED VOMITING TYPE: Primary | ICD-10-CM

## 2018-10-03 DIAGNOSIS — E21.3 HYPERPARATHYROIDISM (H): ICD-10-CM

## 2018-10-03 DIAGNOSIS — K59.00 CONSTIPATION, UNSPECIFIED CONSTIPATION TYPE: ICD-10-CM

## 2018-10-03 LAB
BASOPHILS # BLD AUTO: 0 10E9/L (ref 0–0.2)
BASOPHILS NFR BLD AUTO: 0.6 %
DIFFERENTIAL METHOD BLD: ABNORMAL
EOSINOPHIL # BLD AUTO: 0.1 10E9/L (ref 0–0.7)
EOSINOPHIL NFR BLD AUTO: 2.1 %
ERYTHROCYTE [DISTWIDTH] IN BLOOD BY AUTOMATED COUNT: 13.1 % (ref 10–15)
HCT VFR BLD AUTO: 39 % (ref 35–47)
HGB BLD-MCNC: 12.6 G/DL (ref 11.7–15.7)
IMM GRANULOCYTES # BLD: 0 10E9/L (ref 0–0.4)
IMM GRANULOCYTES NFR BLD: 0 %
LYMPHOCYTES # BLD AUTO: 0.5 10E9/L (ref 0.8–5.3)
LYMPHOCYTES NFR BLD AUTO: 13.5 %
MCH RBC QN AUTO: 30.1 PG (ref 26.5–33)
MCHC RBC AUTO-ENTMCNC: 32.3 G/DL (ref 31.5–36.5)
MCV RBC AUTO: 93 FL (ref 78–100)
MONOCYTES # BLD AUTO: 0.2 10E9/L (ref 0–1.3)
MONOCYTES NFR BLD AUTO: 6.8 %
NEUTROPHILS # BLD AUTO: 2.6 10E9/L (ref 1.6–8.3)
NEUTROPHILS NFR BLD AUTO: 77 %
NRBC # BLD AUTO: 0 10*3/UL
NRBC BLD AUTO-RTO: 0 /100
PLATELET # BLD AUTO: 193 10E9/L (ref 150–450)
RBC # BLD AUTO: 4.19 10E12/L (ref 3.8–5.2)
WBC # BLD AUTO: 3.4 10E9/L (ref 4–11)

## 2018-10-03 RX ORDER — AMOXICILLIN 250 MG
2 CAPSULE ORAL
Qty: 60 TABLET | Refills: 3 | Status: SHIPPED | OUTPATIENT
Start: 2018-10-03 | End: 2019-10-23

## 2018-10-03 RX ORDER — ONDANSETRON 4 MG/1
4 TABLET, FILM COATED ORAL EVERY 8 HOURS PRN
Qty: 20 TABLET | Refills: 1 | Status: SHIPPED | OUTPATIENT
Start: 2018-10-03 | End: 2019-01-02

## 2018-10-03 RX ORDER — CYCLOSPORINE 0.5 MG/ML
EMULSION OPHTHALMIC
Refills: 3 | COMMUNITY
Start: 2018-10-01 | End: 2019-10-23

## 2018-10-03 RX ORDER — LATANOPROST 50 UG/ML
SOLUTION/ DROPS OPHTHALMIC
Refills: 1 | COMMUNITY
Start: 2018-10-01 | End: 2020-07-24

## 2018-10-03 NOTE — PROGRESS NOTES
HPI  58 year old female here for evaluation of several issues.  A Surinamese  was used for  this visit.      1. Nausea / body aches  Patient with at least 2 weeks of nausea and vomiting.  It appears to have started after taking some pain medications.  Although on repeat of the history she does not endorse this.  Nonetheless she is having nausea and vomiting with drinking water.  She can eat some foods without nausea or vomiting but liquids are a problem.  She is wondering if there is a blood test for gastric problems.        2. Pending surgery on neck  Patient also planning on parathyroid surgery in the near future.  She is asking questions about this.  Quick review of the chart shows elevated PTH and a DEXA scan with osteoporosis.    3. Back pain  Continued back pain which is chronic.  Also with fibromyalgia.  Difficult to really get a handle on her situation.        ROS  6 point ROS neg other than the symptoms noted above in the HPI.    MEDS  Current Outpatient Prescriptions   Medication     acetaminophen (TYLENOL) 500 MG tablet     Calcium Carbonate-Vitamin D (CALCIUM-D) 600-400 MG-UNIT TABS     capsaicin (ZOSTRIX) 0.025 % CREA cream     Cholecalciferol (VITAMIN D) 2000 units tablet     ibuprofen (ADVIL/MOTRIN) 600 MG tablet     latanoprost (XALATAN) 0.005 % ophthalmic solution     multivitamin, therapeutic with minerals (MULTI-VITAMIN) TABS tablet     omega 3 1000 MG CAPS     polyethylene glycol (MIRALAX) powder     RESTASIS 0.05 % ophthalmic emulsion     senna-docusate (SENOKOT-S;PERICOLACE) 8.6-50 MG per tablet     vitamin B-Complex     amoxicillin-clavulanate (AUGMENTIN) 875-125 MG per tablet     No current facility-administered medications for this visit.          SOC      FHx  Family History   Problem Relation Age of Onset     Other - See Comments Son      xeroderma pigmentosum       PHYSICAL EXAMINATION:  Vital signs: /82 (BP Location: Left arm, Patient Position: Sitting, Cuff Size: Adult  Regular)  Pulse 106  Temp 98.1  F (36.7  C) (Oral)  Resp 16  Wt 163 lb 3.2 oz (74 kg)  SpO2 100%  BMI 28.91 kg/m2    Gen: no distress  Lungs: clear  Cor: RRR, no S3 S4 murmur or rub  Abd: soft, mild tenderness in epigastrim, no HSM  Ext: no edema        LABS  Results for orders placed or performed in visit on 10/02/18 (from the past 24 hour(s))   Creatinine   Result Value Ref Range    Creatinine 0.52 0.52 - 1.04 mg/dL    GFR Estimate >90 >60 mL/min/1.7m2    GFR Estimate If Black >90 >60 mL/min/1.7m2   Calcium   Result Value Ref Range    Calcium 9.8 8.5 - 10.1 mg/dL     Results for orders placed or performed in visit on 10/03/18   CBC with platelets differential   Result Value Ref Range    WBC 3.4 (L) 4.0 - 11.0 10e9/L    RBC Count 4.19 3.8 - 5.2 10e12/L    Hemoglobin 12.6 11.7 - 15.7 g/dL    Hematocrit 39.0 35.0 - 47.0 %    MCV 93 78 - 100 fl    MCH 30.1 26.5 - 33.0 pg    MCHC 32.3 31.5 - 36.5 g/dL    RDW 13.1 10.0 - 15.0 %    Platelet Count 193 150 - 450 10e9/L    Diff Method Automated Method     % Neutrophils 77.0 %    % Lymphocytes 13.5 %    % Monocytes 6.8 %    % Eosinophils 2.1 %    % Basophils 0.6 %    % Immature Granulocytes 0.0 %    Nucleated RBCs 0 0 /100    Absolute Neutrophil 2.6 1.6 - 8.3 10e9/L    Absolute Lymphocytes 0.5 (L) 0.8 - 5.3 10e9/L    Absolute Monocytes 0.2 0.0 - 1.3 10e9/L    Absolute Eosinophils 0.1 0.0 - 0.7 10e9/L    Absolute Basophils 0.0 0.0 - 0.2 10e9/L    Abs Immature Granulocytes 0.0 0 - 0.4 10e9/L    Absolute Nucleated RBC 0.0            ASSESSMENT/PLAN    1. Nausea and vomiting, intractability of vomiting not specified, unspecified vomiting type  Patient with unusual symptoms of nausea and vomiting with drinking water. She is able to eat some food without problems.  It is a little unclear how it started.  Long discussion regarding options.  We will offer her Zofran as needed and Zantac twice daily for 2 weeks to help heal the gastric lining.  She also is asking for blood test  to test for GERD and gastric lining problems.  We discussed that there is no single test.  We will check a CBC at her request.    - ondansetron (ZOFRAN) 4 MG tablet; Take 1 tablet (4 mg) by mouth every 8 hours as needed for nausea  Dispense: 20 tablet; Refill: 1  - ranitidine (ZANTAC) 150 MG tablet; Take 1 tablet (150 mg) by mouth 2 times daily  Dispense: 30 tablet; Refill: 1  - CBC with platelets differential    2. Constipation, unspecified constipation type  She needs a refill of her senna.    - senna-docusate (SENOKOT-S;PERICOLACE) 8.6-50 MG per tablet; Take 2 tablets by mouth nightly as needed for constipation  Dispense: 60 tablet; Refill: 3    3. Hyperparathyroidism (H)  Patient with hyperparathyroidism.  Has seen the surgeon and is planning on surgery.  She did ask questions about the necessity of the surgery and whether pills could take care of this.  Discussed that her bones are thinning (osteoporosis) and that this would continue unless the hyperparathyroidism is taking care of.  Also assured the patient that the surgeons do very good work in this type of surgery.      CAMI PALACIOS

## 2018-10-03 NOTE — NURSING NOTE
Due to patient being non-English speaking/uses sign language, an  was used for this visit. Only for face-to-face interpretation by an external agency, date and length of interpretation can be found on the scanned worksheet.     name: Sarahi KNIGHT  Agency: [employer]  Language: Mauritian   Telephone number: [phone]  Type of interpretation: Face-to-face, spoken     ANYA Lima 10:09 AM October 3, 2018

## 2018-10-03 NOTE — PATIENT INSTRUCTIONS
1. Nausea and vomiting    Nausea medication (ondansetron - Zofran) - take up to 3 times a day for nausea    Stomach medicine (Ranitidine) - one pill twice  Day for 14 days then stop      - ondansetron (ZOFRAN) 4 MG tablet; Take 1 tablet (4 mg) by mouth every 8 hours as needed for nausea  Dispense: 20 tablet; Refill: 1  - ranitidine (ZANTAC) 150 MG tablet; Take 1 tablet (150 mg) by mouth 2 times daily  Dispense: 30 tablet; Refill: 1      [facility]

## 2018-10-03 NOTE — PROGRESS NOTES
Patient is scheduled for surgery with Dr. Le      Spoke or left message with: patient with [facility]  service    Date of Surgery: 10/30/18    Location: [facility]    H&P: Scheduled with PCP: Walter Kulkarni    Post op : 11/12/18  1:30 pm    Surgery packet: given at appt    Additional comments: mailed out letter of confirmation for surgery and post op dates       Nhung Waters   [facility] Coordinator  [phone]

## 2018-10-03 NOTE — MR AVS SNAPSHOT
After Visit Summary   10/3/2018    Cindy Espinoza    MRN: [id number]           Patient Information     Date Of Birth          1960        Visit Information        Provider Department      10/3/2018 10:20 AM Shad hCadwick MD [facility]        Today's Diagnoses     Nausea and vomiting, intractability of vomiting not specified, unspecified vomiting type    -  1      Care Instructions    1. Nausea and vomiting    Nausea medication (ondansetron - Zofran) - take up to 3 times a day for nausea    Stomach medicine (Ranitidine) - one pill twice  Day for 14 days then stop      - ondansetron (ZOFRAN) 4 MG tablet; Take 1 tablet (4 mg) by mouth every 8 hours as needed for nausea  Dispense: 20 tablet; Refill: 1  - ranitidine (ZANTAC) 150 MG tablet; Take 1 tablet (150 mg) by mouth 2 times daily  Dispense: 30 tablet; Refill: 1      PH[facility]              Follow-ups after your visit        Who to contact     Please call your clinic at [phone] to:    Ask questions about your health    Make or cancel appointments    Discuss your medicines    Learn about your test results    Speak to your doctor            Additional Information About Your Visit        MyChart Information     [email or web address] is an electronic gateway that provides easy, online access to your medical records. With Off & Away, you can request a clinic appointment, read your test results, renew a prescription or communicate with your care team.     To sign up for [email or web address] visit the website at [email or web address]   You will be asked to enter the access code listed below, as well as some personal information. Please follow the directions to create your username and password.     Your access code is: [email or web address]  Expires: 2018  6:30 AM     Your access code will  in 90 days. If you need help or a new code, please contact your [facility] or call [phone] for assistance.        Care  EveryWhere ID     This is your Care EveryWhere ID. This could be used by other organizations to access your [place] medical records  [id number]        Your Vitals Were     Pulse Temperature Respirations Pulse Oximetry BMI (Body Mass Index)       106 98.1  F (36.7  C) (Oral) 16 100% 28.91 kg/m2        Blood Pressure from Last 3 Encounters:   10/03/18 118/82   10/02/18 (P) 121/81   09/27/18 116/68    Weight from Last 3 Encounters:   10/03/18 163 lb 3.2 oz (74 kg)   10/01/18 159 lb (72.1 kg)   09/27/18 160 lb (72.6 kg)              Today, you had the following     No orders found for display         Today's Medication Changes          These changes are accurate as of 10/3/18 10:40 AM.  If you have any questions, ask your nurse or doctor.               Start taking these medicines.        Dose/Directions    ondansetron 4 MG tablet   Commonly known as:  ZOFRAN   Used for:  Nausea and vomiting, intractability of vomiting not specified, unspecified vomiting type   Started by:  Shad Chadwick MD        Dose:  4 mg   Take 1 tablet (4 mg) by mouth every 8 hours as needed for nausea   Quantity:  20 tablet   Refills:  1       ranitidine 150 MG tablet   Commonly known as:  ZANTAC   Used for:  Nausea and vomiting, intractability of vomiting not specified, unspecified vomiting type   Started by:  Shad Chadwick MD        Dose:  150 mg   Take 1 tablet (150 mg) by mouth 2 times daily   Quantity:  30 tablet   Refills:  1         Stop taking these medicines if you haven't already. Please contact your care team if you have questions.     amoxicillin-clavulanate 875-125 MG per tablet   Commonly known as:  AUGMENTIN   Stopped by:  Shad Chadwick MD                Where to get your medicines      These medications were sent to [facility] - [address] [address]     Phone:  [phone]     ondansetron 4 MG tablet    ranitidine 150 MG tablet                Primary Care  Provider Office Phone # Fax #    Walter Qi Kulkarni [phone]       [facility] [address] [place] [address]        Equal Access to Services     GYPSY BELTRE : Hadii aad ku hadkayodekatherine Hal, wadeniseda luqadaha, qaybta kaalmada bety, cooper rivera abijames corbinthomas lind. So [facility] [phone].    ATENCIÓN: Si habla español, tiene a lopez disposición servicios gratuitos de asistencia lingüística. Llame al [phone].    We comply with applicable federal civil rights laws and [place] laws. We do not discriminate on the basis of race, color, national origin, age, disability, sex, sexual orientation, or gender identity.            Thank you!     Thank you for choosing [facility]  for your care. Our goal is always to provide you with excellent care. Hearing back from our patients is one way we can continue to improve our services. Please take a few minutes to complete the written survey that you may receive in the mail after your visit with us. Thank you!             Your Updated Medication List - Protect others around you: Learn how to safely use, store and throw away your medicines at www.disposemymeds.org.          This list is accurate as of 10/3/18 10:40 AM.  Always use your most recent med list.                   Brand Name Dispense Instructions for use Diagnosis    acetaminophen 500 MG tablet    TYLENOL    100 tablet    Take 2 tablets (1,000 mg) by mouth every 8 hours as needed for pain    Chronic bilateral low back pain with right-sided sciatica       Calcium-D 600-400 MG-UNIT Tabs     180 tablet    TAKE 1 TAB BY MOUTH TWICE DAILY    Age-related osteoporosis without current pathological fracture       capsaicin 0.025 % Crea cream    ZOSTRIX    45 g    Apply 1 g topically 3 times daily as needed Wash hands with soap and water after applications.    Chronic bilateral low back pain with right-sided sciatica       ibuprofen 600 MG tablet     ADVIL/MOTRIN    90 tablet    Take 1 tablet (600 mg) by mouth every 6 hours as needed for moderate pain    Lumbar back pain with radiculopathy affecting right lower extremity       latanoprost 0.005 % ophthalmic solution    XALATAN     1 DROP IN BOTH EYES NIGHTLY        multivitamin, therapeutic with minerals Tabs tablet     100 tablet    Take 1 tablet by mouth daily    Fatigue, unspecified type       omega 3 1000 MG Caps     90 capsule    Take 1 g by mouth daily    Health maintenance alteration       ondansetron 4 MG tablet    ZOFRAN    20 tablet    Take 1 tablet (4 mg) by mouth every 8 hours as needed for nausea    Nausea and vomiting, intractability of vomiting not specified, unspecified vomiting type       polyethylene glycol powder    MIRALAX    510 g    Take 17 g (1 capful) by mouth daily    Slow transit constipation       ranitidine 150 MG tablet    ZANTAC    30 tablet    Take 1 tablet (150 mg) by mouth 2 times daily    Nausea and vomiting, intractability of vomiting not specified, unspecified vomiting type       RESTASIS 0.05 % ophthalmic emulsion   Generic drug:  cycloSPORINE      1 DROP IN BOTH EYES 2 TIMES DAILY        senna-docusate 8.6-50 MG per tablet    SENOKOT-S;PERICOLACE    60 tablet    Take 2 tablets by mouth 2 times daily    Constipation, unspecified constipation type       vitamin B-Complex     90 tablet    Take 1 tablet by mouth daily    Chronic bilateral low back pain with right-sided sciatica       vitamin D 2000 units tablet     100 tablet    Take 2,000 Units by mouth daily    Vitamin D deficiency

## 2018-10-03 NOTE — LETTER
October 3, 2018      Christincristy Olga  [address]   [facility] [address]        Dear Cindy,    Thank you for getting your care at [facility]. Please see below for your test results.  Your blood counts are good.     Resulted Orders   CBC with platelets differential   Result Value Ref Range    WBC 3.4 (L) 4.0 - 11.0 10e9/L    RBC Count 4.19 3.8 - 5.2 10e12/L    Hemoglobin 12.6 11.7 - 15.7 g/dL    Hematocrit 39.0 35.0 - 47.0 %    MCV 93 78 - 100 fl    MCH 30.1 26.5 - 33.0 pg    MCHC 32.3 31.5 - 36.5 g/dL    RDW 13.1 10.0 - 15.0 %    Platelet Count 193 150 - 450 10e9/L    Diff Method Automated Method     % Neutrophils 77.0 %    % Lymphocytes 13.5 %    % Monocytes 6.8 %    % Eosinophils 2.1 %    % Basophils 0.6 %    % Immature Granulocytes 0.0 %    Nucleated RBCs 0 0 /100    Absolute Neutrophil 2.6 1.6 - 8.3 10e9/L    Absolute Lymphocytes 0.5 (L) 0.8 - 5.3 10e9/L    Absolute Monocytes 0.2 0.0 - 1.3 10e9/L    Absolute Eosinophils 0.1 0.0 - 0.7 10e9/L    Absolute Basophils 0.0 0.0 - 0.2 10e9/L    Abs Immature Granulocytes 0.0 0 - 0.4 10e9/L    Absolute Nucleated RBC 0.0        Sincerely,   Shad Chadwick MD

## 2018-10-15 NOTE — PROGRESS NOTES
Service Date: 10/01/2018      Greater than 50% of this 30 minute visit was spent in the care and consultation of this patient for surgical options for hyperparathyroidism.      This visit was carried out with an  present.      I was referred to this patient by Dr. Hoskins.      HISTORY OF PRESENT ILLNESS:  This is a 58-year-old female who has had known hyperparathyroidism since at least 2012.  She was diagnosed with hypercalcemia at that time as well as an elevated parathyroid hormone.  A 24 hour urine calcium was not significantly elevated at 180 mg per 24 hours.      An ultrasound performed in 2012 identified a 2.8 x 0.4 x 1.4 cm hypoechoic lesion superior to the thyroid in the left neck.  A nuclear medicine scan was not performed.        The patient's symptoms associated with hyperparathyroidism include osteoporosis.  There is no history of nephrolithiasis.  She denies any constipation, dyspepsia or mental status changes.  She has no joint or muscle aches.  There is no family history or previous history of thyroid or parathyroid disease.      PAST MEDICAL HISTORY, SURGICAL HISTORY AND MEDICATIONS:  Reviewed and are available in the EMR.      REVIEW OF SYSTEMS:  A 10 point review of systems is pertinent for that noted in the HPI.      PHYSICAL EXAMINATION:  Her neck is soft.  The thyroid gland is within normal limits, and there are no palpable masses in the neck.      Most recent laboratory data from 09/07/2018 include a parathyroid hormone level 108, calcium 9.5, phosphorus 2.7, vitamin D 28.  There is no albumin drawn with this most recent calcium.  There is not a 24 hour urine calcium.      Radiographic images include a nuclear medicine scan that does not localize a parathyroid adenoma.  DEXA scan is consistent with osteoporosis, primarily in the lumbar spine.      ASSESSMENT:  Elevated parathyroid hormone with a normal calcium.      PLAN:  First, I would like to repeat calcium, vitamin D and PTH.  I  believe Dr. Hoskins has that ordered.  If the patient remains with only a mildly elevated parathyroid hormone, I think it is reasonable to continue monitoring this patient.  However, if she does have elevated calcium, vitamin D and PTH with a normal albumin and an elevated 24 hour urine calcium, this patient would then be diagnosed with primary hyperparathyroidism.  If that is the case, then I would recommend surgery.  I discussed with the patient the surgical procedure, including a neck exploration and resection of parathyroid adenoma.  We discussed the risks, including, but not limited to, bleeding, infection, injury to the recurrent laryngeal nerve or nerves, potential permanent hypocalcemia or missed parathyroid adenoma.      At the conclusion of the meeting, the patient stated that she would like to discuss with her family if they would like to proceed with surgery.  She will contact our office with any further questions.         SHAWNA WHITE MD             D: 10/14/2018   T: 10/15/2018   MT: vielka      Name:     BENITA DUENAS   MRN:      [id number]        Account:      [id number]   :      1960           Service Date: 10/01/2018      Document: [id number]

## 2018-10-16 ENCOUNTER — TELEPHONE (OUTPATIENT)
Dept: OTOLARYNGOLOGY | Facility: CLINIC | Age: 58
End: 2018-10-16

## 2018-10-16 DIAGNOSIS — E21.3 HYPERPARATHYROIDISM (H): Primary | ICD-10-CM

## 2018-10-16 NOTE — TELEPHONE ENCOUNTER
Contacted pt via . Message was left relaying per Dr. Le surgery is not indicated at this time. We would like pt to follow up for labwork with a clinic visit after this has been completed in 1-2 months. Provided number for scheduling and direct contact information should she have any questions or concerns. Kecia HENDERSON RNCC

## 2018-10-17 ENCOUNTER — OFFICE VISIT (OUTPATIENT)
Dept: FAMILY MEDICINE | Facility: CLINIC | Age: 58
End: 2018-10-17
Payer: COMMERCIAL

## 2018-10-17 VITALS
HEIGHT: 63 IN | RESPIRATION RATE: 14 BRPM | DIASTOLIC BLOOD PRESSURE: 76 MMHG | SYSTOLIC BLOOD PRESSURE: 116 MMHG | OXYGEN SATURATION: 97 % | HEART RATE: 79 BPM | BODY MASS INDEX: 29.09 KG/M2 | WEIGHT: 164.2 LBS

## 2018-10-17 DIAGNOSIS — R10.13 ABDOMINAL PAIN, EPIGASTRIC: ICD-10-CM

## 2018-10-17 DIAGNOSIS — M54.41 CHRONIC RIGHT-SIDED LOW BACK PAIN WITH RIGHT-SIDED SCIATICA: Primary | ICD-10-CM

## 2018-10-17 DIAGNOSIS — G89.29 CHRONIC RIGHT-SIDED LOW BACK PAIN WITH RIGHT-SIDED SCIATICA: Primary | ICD-10-CM

## 2018-10-17 NOTE — PATIENT INSTRUCTIONS
Here is the plan from today's visit    1. Chronic right-sided low back pain with right-sided sciatica  Schedule an appointment to return to see Dr Pryor for OMT.     Please call or return to clinic if your symptoms don't go away.    Follow up plan  As above.     Thank you for coming to [facility] today.  Lab Testing:  **If you had lab testing today and your results are reassuring or normal they will be mailed to you or sent through Table8 within 7 days.   **If the lab tests need quick action we will call you with the results.  The phone number we will call with results is # [phone] (home) . If this is not the best number please call our clinic and change the number.  Medication Refills:  If you need any refills please call your pharmacy and they will contact us.   If you need to  your refill at a new pharmacy, please contact the new pharmacy directly. The new pharmacy will help you get your medications transferred faster.   Scheduling:  If you have any concerns about today's visit or wish to schedule another appointment please call our office during normal business hours [phone] (8-5:00 M-F)  If a referral was made to a [facility] and you don't get a call from central scheduling please call [phone].  If a Mammogram was ordered for you at [facility] call [phone] to schedule or change your appointment.  If you had an XRay/CT/Ultrasound/MRI ordered the number is [phone] to schedule or change your radiology appointment.   Medical Concerns:  If you have urgent medical concerns please call [phone] at any time of the day.    Stef Ortiz MD

## 2018-10-17 NOTE — PROGRESS NOTES
"       HPI       Cindy Espinoza is a 58 year old  who presents for   Chief Complaint   Patient presents with     Abdominal Pain     Back Pain     LOWER  BACK PAIN     Patient is a 58-year-old female with a history of of osteoporosis who is here for evaluation of back pain and abdominal pain.  The patient also has a history of untreated hyperparathyroidism with plans for a parathyroidectomy in November.  She reports that she has been experiencing right-sided low back pain since May.  She was seen here by Dr. Pryor on 1 visit for this but has not pursued any treatment to this point.  She is not taking anything for her pain.  The pain is localized to the right side of her low back with radiation down her right leg past her right knee.  She denies any associated bowel or bladder incontinence.  She denies any neurological deficits in her lower extremities.  No history of trauma.    Patient is also complaining of upper abdominal pain.  Her pain is localized to the epigastric area without any radiation.  The pain is not associated with eating or drinking and does not change with oral intake.  She is not taking anything for this pain either.  She denies any fevers, chills, nausea, vomiting.  She does have a history of GERD and takes ranitidine and his pain is unchanged with this.  No changes in her bowel movements including no black or tarry stools.    A [employer]  was used for  this visit.    +++++++  Problem, Medication and Allergy Lists were reviewed and updated if needed..    Patient is an established patient of this clinic..         Review of Systems:   Review of Systems  A full six-point review of systems was completed and negative other than noted in HPI.       Physical Exam:     Vitals:    10/17/18 1304   BP: 116/76   Pulse: 79   Resp: 14   SpO2: 97%   Weight: 164 lb 3.2 oz (74.5 kg)   Height: 5' 2.99\" (160 cm)     Body mass index is 29.09 kg/(m^2).  Vitals were reviewed and were normal     Physical Exam "   Constitutional: She is oriented to person, place, and time. She appears well-developed and well-nourished. No distress.   HENT:   Head: Normocephalic and atraumatic.   Eyes: Right eye exhibits no discharge. Left eye exhibits no discharge. No scleral icterus.   Neck: Normal range of motion.   Cardiovascular: Normal rate, regular rhythm and normal heart sounds.  Exam reveals no gallop and no friction rub.    No murmur heard.  Pulmonary/Chest: Effort normal. No respiratory distress. She has no wheezes. She has no rales.   Abdominal: Soft. She exhibits no distension. There is tenderness (epigastric, mild). There is no rebound and no guarding.   Musculoskeletal: Normal range of motion.   Neurological: She is alert and oriented to person, place, and time. No cranial nerve deficit.   Skin: Skin is warm and dry. She is not diaphoretic.   Psychiatric: She has a normal mood and affect.         Results:   Results are ordered and pending    Assessment and Plan        1. Chronic right-sided low back pain with right-sided sciatica  Patient has chronic right-sided low back pain with right-sided sciatica in setting of osteoporosis and untreated hyperparathyroidism.  This amounts to a significant risk for an osteoporotic fracture.  She has not tried conservative management and does not have any red flag symptoms so imaging was not obtained at this visit.  She did not want to pursue physical therapy so was referred for OMT with  who she saw before.  If her pain is not improved within 6 weeks she will require imaging given her relatively high risk for fracture.    2. Abdominal pain, epigastric  Considering etiologies for her epigastric abdominal pain including pancreatitis, peptic ulcer disease, gastritis, biliary colic I feel that this is most likely a gastritis.  Exam was benign with normal vitals.  No worrisome signs for bleeding.  We will do an H. pylori stool test and follow-up after that is completed.  - H Pylori  antigen stool; Future       There are no discontinued medications.    Options for treatment and follow-up care were reviewed with the patient. Cindy Espinoza  engaged in the decision making process and verbalized understanding of the options discussed and agreed with the final plan.    Stef Ortiz MD

## 2018-10-17 NOTE — MR AVS SNAPSHOT
After Visit Summary   10/17/2018    Cindy Espinoza    MRN: [id number]           Patient Information     Date Of Birth          1960        Visit Information        Provider Department      10/17/2018 2:20 PM Stef Ortiz MD [facility]        Today's Diagnoses     Chronic right-sided low back pain with right-sided sciatica    -  1    Abdominal pain, epigastric          Care Instructions    Here is the plan from today's visit    1. Chronic right-sided low back pain with right-sided sciatica  Schedule an appointment to return to see Dr Pryor for OMT.     Please call or return to clinic if your symptoms don't go away.    Follow up plan  As above.     Thank you for coming to [facility] today.  Lab Testing:  **If you had lab testing today and your results are reassuring or normal they will be mailed to you or sent through realSociable within 7 days.   **If the lab tests need quick action we will call you with the results.  The phone number we will call with results is # [phone] (home) . If this is not the best number please call our clinic and change the number.  Medication Refills:  If you need any refills please call your pharmacy and they will contact us.   If you need to  your refill at a new pharmacy, please contact the new pharmacy directly. The new pharmacy will help you get your medications transferred faster.   Scheduling:  If you have any concerns about today's visit or wish to schedule another appointment please call our office during normal business hours [phone] (8-5:00 M-F)  If a referral was made to a [facility] and you don't get a call from central scheduling please call [phone].  If a Mammogram was ordered for you at [facility] call [phone] to schedule or change your appointment.  If you had an XRay/CT/Ultrasound/MRI ordered the number is [phone] to schedule or change your radiology appointment.    Medical Concerns:  If you have urgent medical concerns please call [phone] at any time of the day.    Stef Ortiz MD            Follow-ups after your visit        Your next 10 appointments already scheduled     Oct 17, 2018  2:20 PM CDT   Return Visit with Stef Ortiz MD   [facility]     [address],  [address]  [place] [address]   [phone]              Future tests that were ordered for you today     Open Future Orders        Priority Expected Expires Ordered    H Pylori antigen stool Routine  2018 10/17/2018    Parathyroid Hormone Intact Routine  10/16/2019 10/16/2018    Calcium ionized Routine  10/17/2019 10/16/2018    Vitamin D Deficiency Routine  10/16/2019 10/16/2018            Who to contact     Please call your clinic at [phone] to:    Ask questions about your health    Make or cancel appointments    Discuss your medicines    Learn about your test results    Speak to your doctor            Additional Information About Your Visit        iCurrenthart Information     [employer] is an electronic gateway that provides easy, online access to your medical records. With [employer], you can request a clinic appointment, read your test results, renew a prescription or communicate with your care team.     To sign up for [employer] visit the website at [email or web address]   You will be asked to enter the access code listed below, as well as some personal information. Please follow the directions to create your username and password.     Your access code is: [email or web address]  Expires: 2018  6:30 AM     Your access code will  in 90 days. If you need help or a new code, please contact your [facility] or call [phone] for assistance.        Care EveryWhere ID     This is your Care EveryWhere ID. This could be used by other organizations to access your [facility] medical records  [id number]       "  Your Vitals Were     Pulse Respirations Height Pulse Oximetry BMI (Body Mass Index)       79 14 5' 2.99\" (160 cm) 97% 29.09 kg/m2        Blood Pressure from Last 3 Encounters:   10/17/18 116/76   10/03/18 118/82   10/02/18 (P) 121/81    Weight from Last 3 Encounters:   10/17/18 164 lb 3.2 oz (74.5 kg)   10/03/18 163 lb 3.2 oz (74 kg)   10/01/18 159 lb (72.1 kg)               Primary Care Provider Office Phone # Fax #    Walter Qi Kulkarni [phone]       [facility] [address] [address]        Equal Access to Services     GYPSY BELTRE : Anabel carmeno Sosherrie, waaxda luqadaha, qaybta kaalmada adeegyada, cooper lind. So [facility] [phone].    ATENCIÓN: Si habla español, tiene a lopez disposición servicios gratuitos de asistencia lingüística. [facility] [phone].    We comply with applicable federal civil rights laws and [place] laws. We do not discriminate on the basis of race, color, national origin, age, disability, sex, sexual orientation, or gender identity.            Thank you!     Thank you for choosing [facility]  for your care. Our goal is always to provide you with excellent care. Hearing back from our patients is one way we can continue to improve our services. Please take a few minutes to complete the written survey that you may receive in the mail after your visit with us. Thank you!             Your Updated Medication List - Protect others around you: Learn how to safely use, store and throw away your medicines at www.disposemymeds.org.          This list is accurate as of 10/17/18  1:53 PM.  Always use your most recent med list.                   Brand Name Dispense Instructions for use Diagnosis    acetaminophen 500 MG tablet    TYLENOL    100 tablet    Take 2 tablets (1,000 mg) by mouth every 8 hours as needed for pain    Chronic bilateral low back pain with right-sided sciatica       " amoxicillin-clavulanate 875-125 MG per tablet    AUGMENTIN     TAKE 1 TABLET BY MOUTH 2 TIMES DAILY        Calcium-D 600-400 MG-UNIT Tabs     180 tablet    TAKE 1 TAB BY MOUTH TWICE DAILY    Age-related osteoporosis without current pathological fracture       capsaicin 0.025 % Crea cream    ZOSTRIX    45 g    Apply 1 g topically 3 times daily as needed Wash hands with soap and water after applications.    Chronic bilateral low back pain with right-sided sciatica       ibuprofen 600 MG tablet    ADVIL/MOTRIN    90 tablet    Take 1 tablet (600 mg) by mouth every 6 hours as needed for moderate pain    Lumbar back pain with radiculopathy affecting right lower extremity       latanoprost 0.005 % ophthalmic solution    XALATAN     1 DROP IN BOTH EYES NIGHTLY        multivitamin, therapeutic with minerals Tabs tablet     100 tablet    Take 1 tablet by mouth daily    Fatigue, unspecified type       omega 3 1000 MG Caps     90 capsule    Take 1 g by mouth daily    Health maintenance alteration       ondansetron 4 MG tablet    ZOFRAN    20 tablet    Take 1 tablet (4 mg) by mouth every 8 hours as needed for nausea    Nausea and vomiting, intractability of vomiting not specified, unspecified vomiting type       polyethylene glycol powder    MIRALAX    510 g    Take 17 g (1 capful) by mouth daily    Slow transit constipation       ranitidine 150 MG tablet    ZANTAC    30 tablet    Take 1 tablet (150 mg) by mouth 2 times daily    Nausea and vomiting, intractability of vomiting not specified, unspecified vomiting type       RESTASIS 0.05 % ophthalmic emulsion   Generic drug:  cycloSPORINE      1 DROP IN BOTH EYES 2 TIMES DAILY        senna-docusate 8.6-50 MG per tablet    SENOKOT-S;PERICOLACE    60 tablet    Take 2 tablets by mouth nightly as needed for constipation    Constipation, unspecified constipation type       vitamin B-Complex     90 tablet    Take 1 tablet by mouth daily    Chronic bilateral low back pain with  right-sided sciatica       vitamin D 2000 units tablet     100 tablet    Take 2,000 Units by mouth daily    Vitamin D deficiency

## 2018-10-17 NOTE — PROGRESS NOTES
Preceptor Attestation:   Patient seen, evaluated and discussed with the resident. I have verified the content of the note, which accurately reflects my assessment of the patient and the plan of care.   Supervising Physician:  Erich Lincoln MD

## 2018-10-17 NOTE — NURSING NOTE
Due to patient being non-English speaking/uses sign language, an  was used for this visit. Only for face-to-face interpretation by an external agency, date and length of interpretation can be found on the scanned worksheet.     name: ANGELIA  Agency: [employer]  Language: Kittitian   Telephone number: [phone]  Type of interpretation: Face-to-face, spoken   ANYA Chatman

## 2018-10-18 DIAGNOSIS — E21.3 HYPERPARATHYROIDISM (H): ICD-10-CM

## 2018-10-18 DIAGNOSIS — R10.13 ABDOMINAL PAIN, EPIGASTRIC: ICD-10-CM

## 2018-10-18 LAB
CA-I SERPL ISE-MCNC: 5.1 MG/DL (ref 4.4–5.2)
PTH-INTACT SERPL-MCNC: 92 PG/ML (ref 18–80)

## 2018-10-19 LAB
DEPRECATED CALCIDIOL+CALCIFEROL SERPL-MC: 32 UG/L (ref 20–75)
H PYLORI AG STL QL IA: NORMAL
SPECIMEN SOURCE: NORMAL

## 2018-11-20 ENCOUNTER — OFFICE VISIT (OUTPATIENT)
Dept: FAMILY MEDICINE | Facility: CLINIC | Age: 58
End: 2018-11-20
Payer: COMMERCIAL

## 2018-11-20 ENCOUNTER — RADIANT APPOINTMENT (OUTPATIENT)
Dept: GENERAL RADIOLOGY | Facility: CLINIC | Age: 58
End: 2018-11-20
Attending: FAMILY MEDICINE
Payer: COMMERCIAL

## 2018-11-20 VITALS
SYSTOLIC BLOOD PRESSURE: 127 MMHG | HEART RATE: 85 BPM | TEMPERATURE: 97.7 F | WEIGHT: 162.2 LBS | RESPIRATION RATE: 16 BRPM | BODY MASS INDEX: 28.74 KG/M2 | DIASTOLIC BLOOD PRESSURE: 84 MMHG | OXYGEN SATURATION: 95 %

## 2018-11-20 DIAGNOSIS — M79.671 RIGHT FOOT PAIN: ICD-10-CM

## 2018-11-20 DIAGNOSIS — S92.501A CLOSED FRACTURE OF PHALANX OF RIGHT FIFTH TOE, INITIAL ENCOUNTER: Primary | ICD-10-CM

## 2018-11-20 LAB — RADIOLOGIST FLAGS: NORMAL

## 2018-11-20 ASSESSMENT — ENCOUNTER SYMPTOMS
ACTIVITY CHANGE: 0
WEAKNESS: 0
NUMBNESS: 0
WOUND: 0

## 2018-11-20 NOTE — MR AVS SNAPSHOT
After Visit Summary   11/20/2018    Cindy Espinoza    MRN: [id number]           Patient Information     Date Of Birth          1960        Visit Information        Provider Department      11/20/2018 10:40 AM Ayanna Zimmerman MD [facility]        Today's Diagnoses     Right foot pain    -  1      Care Instructions    Here is the plan from today's visit    1. Right pinky toe fracture  XR shows acute fracture of right 5th digit.   - wear a shoe with a wide toe box and hard bottom.   - around the house, wear a thick sock and the sandal provided.    - elevate the foot and apply ice  - tape the toe to the nearby toe for added support  - ibuprofen for pain  - this will improve slowly with time, expect it to heal over about 4 weeks.       Please call or return to clinic if your symptoms don't go away.  Thank you for coming to [facility] today.  Lab Testing:  **If you had lab testing today and your results are reassuring or normal they will be mailed to you or sent through P2P-Next within 7 days.   **If the lab tests need quick action we will call you with the results.  The phone number we will call with results is # [phone] (home) . If this is not the best number please call our clinic and change the number.  Medication Refills:  If you need any refills please call your pharmacy and they will contact us.   If you need to  your refill at a new pharmacy, please contact the new pharmacy directly. The new pharmacy will help you get your medications transferred faster.   Scheduling:  If you have any concerns about today's visit or wish to schedule another appointment please call our office during normal business hours [phone] (8-5:00 M-F)  If a referral was made to a [facility] and you don't get a call from central scheduling please call [phone].  If a Mammogram was ordered for you at [facility] call [phone] to  schedule or change your appointment.  If you had an XRay/CT/Ultrasound/MRI ordered the number is [phone] to schedule or change your radiology appointment.   Medical Concerns:  If you have urgent medical concerns please call [phone] at any time of the day.    Ayanna Zimmerman MD             Follow-ups after your visit        Who to contact     Please call your clinic at [phone] to:    Ask questions about your health    Make or cancel appointments    Discuss your medicines    Learn about your test results    Speak to your doctor            Additional Information About Your Visit        Care EveryWhere ID     This is your Care EveryWhere ID. This could be used by other organizations to access your [facility] medical records  [id number]        Your Vitals Were     Pulse Temperature Respirations Pulse Oximetry BMI (Body Mass Index)       85 97.7  F (36.5  C) (Oral) 16 95% 28.74 kg/m2        Blood Pressure from Last 3 Encounters:   11/20/18 127/84   10/17/18 116/76   10/03/18 118/82    Weight from Last 3 Encounters:   11/20/18 162 lb 3.2 oz (73.6 kg)   10/17/18 164 lb 3.2 oz (74.5 kg)   10/03/18 163 lb 3.2 oz (74 kg)               Primary Care Provider Office Phone # Fax #    Walter Qi Kulkarni [phone]       [facility] [address] [address]        Equal Access to Services     [facility] HospitalREBECCA AH: Hadii aad ku hadasho Soomaali, waaxda luqadaha, qaybta kaalmada adeegyada, cooper stewart . So [facility] [phone].    ATENCIÓN: Si habla español, tiene a lopez disposición servicios gratuitos de asistencia lingüística. Llame al [phone].    We comply with applicable federal civil rights laws and [place] laws. We do not discriminate on the basis of race, color, national origin, age, disability, sex, sexual orientation, or gender identity.            Thank you!     Thank you for choosing [facility]  for  your care. Our goal is always to provide you with excellent care. Hearing back from our patients is one way we can continue to improve our services. Please take a few minutes to complete the written survey that you may receive in the mail after your visit with us. Thank you!             Your Updated Medication List - Protect others around you: Learn how to safely use, store and throw away your medicines at www.disposemymeds.org.          This list is accurate as of 11/20/18 12:20 PM.  Always use your most recent med list.                   Brand Name Dispense Instructions for use Diagnosis    acetaminophen 500 MG tablet    TYLENOL    100 tablet    Take 2 tablets (1,000 mg) by mouth every 8 hours as needed for pain    Chronic bilateral low back pain with right-sided sciatica       amoxicillin-clavulanate 875-125 MG per tablet    AUGMENTIN     TAKE 1 TABLET BY MOUTH 2 TIMES DAILY        Calcium-D 600-400 MG-UNIT Tabs     180 tablet    TAKE 1 TAB BY MOUTH TWICE DAILY    Age-related osteoporosis without current pathological fracture       capsaicin 0.025 % Crea cream    ZOSTRIX    45 g    Apply 1 g topically 3 times daily as needed Wash hands with soap and water after applications.    Chronic bilateral low back pain with right-sided sciatica       ibuprofen 600 MG tablet    ADVIL/MOTRIN    90 tablet    Take 1 tablet (600 mg) by mouth every 6 hours as needed for moderate pain    Lumbar back pain with radiculopathy affecting right lower extremity       latanoprost 0.005 % ophthalmic solution    XALATAN     1 DROP IN BOTH EYES NIGHTLY        multivitamin, therapeutic with minerals Tabs tablet     100 tablet    Take 1 tablet by mouth daily    Fatigue, unspecified type       omega 3 1000 MG Caps     90 capsule    Take 1 g by mouth daily    Health maintenance alteration       ondansetron 4 MG tablet    ZOFRAN    20 tablet    Take 1 tablet (4 mg) by mouth every 8 hours as needed for nausea    Nausea and vomiting,  intractability of vomiting not specified, unspecified vomiting type       polyethylene glycol powder    MIRALAX    510 g    Take 17 g (1 capful) by mouth daily    Slow transit constipation       ranitidine 150 MG tablet    ZANTAC    30 tablet    Take 1 tablet (150 mg) by mouth 2 times daily    Nausea and vomiting, intractability of vomiting not specified, unspecified vomiting type       RESTASIS 0.05 % ophthalmic emulsion   Generic drug:  cycloSPORINE      1 DROP IN BOTH EYES 2 TIMES DAILY        senna-docusate 8.6-50 MG per tablet    SENOKOT-S;PERICOLACE    60 tablet    Take 2 tablets by mouth nightly as needed for constipation    Constipation, unspecified constipation type       vitamin B-Complex     90 tablet    Take 1 tablet by mouth daily    Chronic bilateral low back pain with right-sided sciatica       vitamin D3 2000 units tablet    CHOLECALCIFEROL    100 tablet    Take 2,000 Units by mouth daily    Vitamin D deficiency

## 2018-11-20 NOTE — PROGRESS NOTES
HPI       Cindy Espinoza is a 58 year old F who presents for   Chief Complaint   Patient presents with     Musculoskeletal Problem     right foot pain due to hitting the lateral side of her foot/pinky toe at home. Achey/throbbing pain x's 1 day     Last evening struck her left little toe on the bedpost. Immediately developed severe pain, which has persisted since onset despite ibuprofen. No swelling or bruising noted. Able to ambulate, but has to limp as bearing weight on the toe causes pain to worsen.     Did not sustain any other injuries.     A [employer]  was used for  this visit.    +++++++      Problem, Medication and Allergy Lists were reviewed and updated if needed..    Patient is an established patient of this clinic..         Review of Systems:   Review of Systems   Constitutional: Negative for activity change.   Musculoskeletal: Positive for gait problem (2/2 toe pain).        Positive for right 5th toe pain   Skin: Negative for rash and wound.   Neurological: Negative for weakness and numbness.            Physical Exam:     Vitals:    11/20/18 1048   BP: 127/84   Pulse: 85   Resp: 16   Temp: 97.7  F (36.5  C)   TempSrc: Oral   SpO2: 95%   Weight: 162 lb 3.2 oz (73.6 kg)     Body mass index is 28.74 kg/(m^2).  Vitals were reviewed and were normal     Physical Exam   Constitutional: She is oriented to person, place, and time. She appears well-developed and well-nourished. No distress.   HENT:   Head: Normocephalic and atraumatic.   Eyes: EOM are normal. Pupils are equal, round, and reactive to light.   Neck: Normal range of motion.   Pulmonary/Chest: Effort normal. No respiratory distress.   Musculoskeletal:        Right foot: There is decreased range of motion (2/2 pain, passive and active flexion and extension of right 5th digit limited.) and tenderness. There is no swelling, normal capillary refill, no deformity and no laceration.        Feet:    Neurological: She is alert and oriented to  person, place, and time.   Skin: Skin is warm and dry.   Psychiatric: She has a normal mood and affect.   Nursing note and vitals reviewed.     Ortho Exam      Results:   XR of right foot:  Reviewed by myself and Dr. Zimmerman, official read pending  - fracture of the 5th proximal phalanx      Assessment and Plan      1. Closed fracture of phalanx of right fifth toe, initial encounter  XR shows fracture of proximal phalanx of right fifth toe, sustained from injury.   - patient given post op shoe to wear around house  - recommend wide-toe box and hard soled shoe  - ibuprofen for pain, elevate and ice the toe  - ru tapping for support  - should heal over time, estimate about 4 weeks.          There are no discontinued medications.    Options for treatment and follow-up care were reviewed with the patient. Cindy Espinoza  engaged in the decision making process and verbalized understanding of the options discussed and agreed with the final plan.    Scribed by Sayda Tabor MD     I have personally examined and discussed this plan with the resident and patient.  Pt will RTC prn.  I agree with the above documentation.    DEJAN Zimmerman MD

## 2018-11-20 NOTE — NURSING NOTE
Due to patient being non-English speaking/uses sign language, an  was used for this visit. Only for face-to-face interpretation by an external agency, date and length of interpretation can be found on the scanned worksheet.     name: Paradise Olmstead  Agency: [employer]  Language: Swiss   Telephone number: [phone]  Type of interpretation: Face-to-face, spoken    Stephanie Quintanilla CMA

## 2018-11-20 NOTE — PATIENT INSTRUCTIONS
Here is the plan from today's visit    1. Right pinky toe fracture  XR shows acute fracture of right 5th digit.   - wear a shoe with a wide toe box and hard bottom.   - around the house, wear a thick sock and the sandal provided.    - elevate the foot and apply ice  - tape the toe to the nearby toe for added support  - ibuprofen for pain  - this will improve slowly with time, expect it to heal over about 4 weeks.       Please call or return to clinic if your symptoms don't go away.  Thank you for coming to [facility] today.  Lab Testing:  **If you had lab testing today and your results are reassuring or normal they will be mailed to you or sent through [employer] within 7 days.   **If the lab tests need quick action we will call you with the results.  The phone number we will call with results is # [phone] (home) . If this is not the best number please call our clinic and change the number.  Medication Refills:  If you need any refills please call your pharmacy and they will contact us.   If you need to  your refill at a new pharmacy, please contact the new pharmacy directly. The new pharmacy will help you get your medications transferred faster.   Scheduling:  If you have any concerns about today's visit or wish to schedule another appointment please call our office during normal business hours [phone] (8-5:00 M-F)  If a referral was made to a [facility] and you don't get a call from central scheduling please call [phone].  If a Mammogram was ordered for you at [facility] call [phone] to schedule or change your appointment.  If you had an XRay/CT/Ultrasound/MRI ordered the number is [phone] to schedule or change your radiology appointment.   Medical Concerns:  If you have urgent medical concerns please call [phone] at any time of the day.    Ayanna Zimmerman MD

## 2018-12-26 ENCOUNTER — OFFICE VISIT (OUTPATIENT)
Dept: FAMILY MEDICINE | Facility: CLINIC | Age: 58
End: 2018-12-26
Payer: COMMERCIAL

## 2018-12-26 VITALS
WEIGHT: 164.4 LBS | SYSTOLIC BLOOD PRESSURE: 127 MMHG | BODY MASS INDEX: 29.13 KG/M2 | OXYGEN SATURATION: 99 % | DIASTOLIC BLOOD PRESSURE: 84 MMHG | TEMPERATURE: 97.6 F | HEART RATE: 88 BPM

## 2018-12-26 DIAGNOSIS — R10.11 RUQ ABDOMINAL PAIN: Primary | ICD-10-CM

## 2018-12-26 DIAGNOSIS — S92.511D CLOSED DISPLACED FRACTURE OF PROXIMAL PHALANX OF LESSER TOE OF RIGHT FOOT WITH ROUTINE HEALING, SUBSEQUENT ENCOUNTER: ICD-10-CM

## 2018-12-26 RX ORDER — SENNOSIDES 8.6 MG/1
TABLET, FILM COATED ORAL
Refills: 0 | COMMUNITY
Start: 2018-10-22 | End: 2020-12-23

## 2018-12-26 NOTE — PATIENT INSTRUCTIONS
Right Foot  1. It can take 6 weeks before it's better.  2. Do exercises were you stand only on the foot and put your other leg up.  3. We will image your toe at your next visit.    Abdominal Pain  1. I will order an Ultra Sound to determine if you have gallstones. Someone will call you to schedule.  2. Follow up with me after the ultrasound, in about a week, to talk about your results.

## 2018-12-26 NOTE — PROGRESS NOTES
HPI       Cindy Espinoza is a 58 year old  who presents for   Chief Complaint   Patient presents with     RECHECK     thyroid     Toe Fracture  Xray from 11/20/18 shows: mildly displaced fracture proximal phalanx of the right fifth toe. Is taking vitamin D and calcium supplements.     Abd Pain  Reports she has been experiencing colicky RUQ abd pain for the past several months, but it's worsened. Points to 3 finger below costal margin as the greatest area of pain. Hurts to touch. Sx are better if she lays on her back. Does not eat fatty foods. Eats meat, but sx are not exacerbated after. Denies burning in the throat.     Has questions about parathyroid adenoma results, wants a second opinion whether or not she needs surgery.    A [employer]  was used for  this visit.    +++++++      Problem, Medication and Allergy Lists were reviewed and updated if needed..    Patient is an established patient of this clinic..         Review of Systems:   Review of Systems    Positive for: RUQ pain, right toe pain    PHQ 2 Negative.    See HPI for additional sx.    This document serves as a record of the services and decisions personally performed and made by Arleth Phelps MD. It was created on his/her behalf by Marcus Isbell, a trained medical scribe. The creation of this document is based the provider's statements to the medical scribe.  Scrdileep Isbell 4:58 PM, December 26, 2018       Physical Exam:     Vitals:    12/26/18 1611   BP: 127/84   Pulse: 88   Temp: 97.6  F (36.4  C)   TempSrc: Oral   SpO2: 99%   Weight: 74.6 kg (164 lb 6.4 oz)     Body mass index is 29.13 kg/m .  Vitals were reviewed and were normal     Physical Exam    BMI= Body mass index is 29.13 kg/m .   GENERAL: healthy, alert and no distress  ABDOMEN: 3 finger below costal margin: no hepatosplenomegaly. Positive Paige's sign. No rebound, abd is soft.  MS: Right Foot: phalanx is still swollen with some discoloration over the proximal joint but  not tender to palpation.  PSYCH: Alert and oriented times 3; speech- coherent , normal rate and volume; able to articulate logical thoughts, affect- normal      Results:   Results are ordered and pending    Assessment and Plan   Cindy was seen today for recheck.    Diagnoses and all orders for this visit:    RUQ abdominal pain  -     US Abdomen Limited; Future. Follow up with me in one week after US.    Closed displaced fracture of proximal phalanx of lesser toe of right foot with routine healing, subsequent encounter  - Will re-image in one week.   - Continue exercises: standing on right foot while lifting left foot off the ground.    There are no discontinued medications.    Patient Instructions   Right Foot  1. It can take 6 weeks before it's better.  2. Do exercises were you stand only on the foot and put your other leg up.  3. We will image your toe at your next visit.    Abdominal Pain  1. I will order an Ultra Sound to determine if you have gallstones. Someone will call you to schedule.  2. Follow up with me after the ultrasound, in about a week, to talk about your results.    Options for treatment and follow-up care were reviewed with the patient. Cindy Espinoza  engaged in the decision making process and verbalized understanding of the options discussed and agreed with the final plan.    The information in this document, created by the medical scribe for me, accurately reflects the services I personally performed and the decisions made by me. I have reviewed and approved this document for accuracy prior to leaving the patient care area.    Arleth Phelps MD  4:58 PM, 12/26/18

## 2018-12-26 NOTE — NURSING NOTE
Due to patient being non-English speaking/uses sign language, an  was used for this visit. Only for face-to-face interpretation by an external agency, date and length of interpretation can be found on the scanned worksheet.     name: amy Olmstead  Agency: [employer]  Language: [place] Islander   Telephone number: [phone]  Type of interpretation: Face-to-face, spoken

## 2018-12-27 ENCOUNTER — TELEPHONE (OUTPATIENT)
Dept: FAMILY MEDICINE | Facility: CLINIC | Age: 58
End: 2018-12-27

## 2018-12-27 NOTE — TELEPHONE ENCOUNTER
I called patient and informed her of her appointment at the [facility] December 31st at 3pm.  Alsom informed the patient that she needs to fast 8 hrs prior to the Ultrasound. Patient stated understanding.    Due to patient being non-English speaking/uses sign language, an  was used for this visit. Only for face-to-face interpretation by an external agency, date and length of interpretation can be found on the scanned worksheet.     name: [id number]  Agency: [employer] - telephone  Language: Citizen of [place]   Telephone number: na  Type of interpretation: Face-to-face, spoken

## 2018-12-31 ENCOUNTER — ANCILLARY PROCEDURE (OUTPATIENT)
Dept: ULTRASOUND IMAGING | Facility: CLINIC | Age: 58
End: 2018-12-31
Attending: FAMILY MEDICINE
Payer: COMMERCIAL

## 2018-12-31 DIAGNOSIS — R10.11 RUQ ABDOMINAL PAIN: ICD-10-CM

## 2019-01-02 ENCOUNTER — ANCILLARY PROCEDURE (OUTPATIENT)
Dept: GENERAL RADIOLOGY | Facility: CLINIC | Age: 59
End: 2019-01-02
Payer: COMMERCIAL

## 2019-01-02 ENCOUNTER — OFFICE VISIT (OUTPATIENT)
Dept: FAMILY MEDICINE | Facility: CLINIC | Age: 59
End: 2019-01-02
Payer: COMMERCIAL

## 2019-01-02 VITALS
RESPIRATION RATE: 20 BRPM | BODY MASS INDEX: 29.55 KG/M2 | TEMPERATURE: 97.6 F | SYSTOLIC BLOOD PRESSURE: 113 MMHG | WEIGHT: 166.8 LBS | OXYGEN SATURATION: 95 % | HEART RATE: 82 BPM | DIASTOLIC BLOOD PRESSURE: 77 MMHG

## 2019-01-02 DIAGNOSIS — Z12.31 ENCOUNTER FOR SCREENING MAMMOGRAM FOR BREAST CANCER: ICD-10-CM

## 2019-01-02 DIAGNOSIS — M81.0 AGE-RELATED OSTEOPOROSIS WITHOUT CURRENT PATHOLOGICAL FRACTURE: ICD-10-CM

## 2019-01-02 DIAGNOSIS — E55.9 VITAMIN D DEFICIENCY: ICD-10-CM

## 2019-01-02 DIAGNOSIS — K80.20 CALCULUS OF GALLBLADDER WITHOUT CHOLECYSTITIS WITHOUT OBSTRUCTION: ICD-10-CM

## 2019-01-02 DIAGNOSIS — S92.511G CLOSED DISPLACED FRACTURE OF PROXIMAL PHALANX OF LESSER TOE OF RIGHT FOOT WITH DELAYED HEALING, SUBSEQUENT ENCOUNTER: ICD-10-CM

## 2019-01-02 DIAGNOSIS — S92.511D CLOSED DISPLACED FRACTURE OF PROXIMAL PHALANX OF LESSER TOE OF RIGHT FOOT WITH ROUTINE HEALING, SUBSEQUENT ENCOUNTER: Primary | ICD-10-CM

## 2019-01-02 RX ORDER — CHOLECALCIFEROL (VITAMIN D3) 50 MCG
2000 TABLET ORAL DAILY
Qty: 100 TABLET | Refills: 3 | Status: SHIPPED | OUTPATIENT
Start: 2019-01-02 | End: 2020-07-24

## 2019-01-02 NOTE — PATIENT INSTRUCTIONS
Care Coordination  1. Charleen will help you enroll in [facility] today. She can also answer questions regarding who the  is for Osbaldo.    Preventative  1. Referral for mammogram today.  2. I will provide a referral for Vitamin D and calcium today.     Abdominal Pain  1. Referral to Dr. Burk. Talk to Dr. Burk about your back pain.    General Surgery referral  This patient is scheduled for an appointment on 2/19/2019 at 10am, Dr. Burk.      Thank you!   Imani   [facility] Referrals

## 2019-01-02 NOTE — NURSING NOTE
Due to patient being non-English speaking/uses sign language, an  was used for this visit. Only for face-to-face interpretation by an external agency, date and length of interpretation can be found on the scanned worksheet.     name: Sarahi KNIGHT  Agency: [employer]  Language: Yemeni   Telephone number: [phone]  Type of interpretation: Face-to-face, spoken     ANYA Lima 4:28 PM January 2, 2019

## 2019-01-02 NOTE — PROGRESS NOTES
HPI       Cindy Espinoza is a 59 year old  who presents for   Chief Complaint   Patient presents with     RECHECK     results on her abdominal ultrasound done 12/31/18     Preventative  Has received her flu shot this year.    Mood    Cb-7 Score Today: not completed  PHQ-9 Score Today: 0    Abd Pain  Pt presents today to discuss results, results showed gallstones. Has RUQ pain that radiates to her back. No diff with foods but very little fatty/oily foods in diet.     Toe Fracture  Reviewed imaging today -pain limiting her some but better ovrerally. Still ru taping. . Is taking Vitamin D and calcium. Is tolerating both well. Request refill on both.    Social Hx  Is interested in enrolling in [facility].    +++++++  A [employer]  was used for  this visit.      Problem, Medication and Allergy Lists were reviewed and updated if needed..    Patient is an established patient of this clinic..         Review of Systems:   Review of Systems    Positive for: lower back pain > on the right than the left, RUQ abd pain    See HPI for additional sx.    This document serves as a record of the services and decisions personally performed and made by Arleth Phelps MD. It was created on his/her behalf by Marcus Isbell, a trained medical scribe. The creation of this document is based the provider's statements to the medical scribe.  Scribmita Isbell 4:56 PM, January 2, 2019       Physical Exam:     Vitals:    01/02/19 1626   BP: 113/77   BP Location: Left arm   Patient Position: Sitting   Cuff Size: Adult Large   Pulse: 82   Resp: 20   Temp: 97.6  F (36.4  C)   TempSrc: Oral   SpO2: 95%   Weight: 75.7 kg (166 lb 12.8 oz)     Body mass index is 29.55 kg/m .  Vitals were reviewed and were normal     Physical Exam    BMI= Body mass index is 29.55 kg/m .   GENERAL: healthy, alert and no distress  MS: Right Foot: No swelling, no redness, some discoloration at base of pinky toe,   PSYCH: Alert and oriented times 3;  speech- coherent , normal rate and volume; able to articulate logical thoughts, able to abstract reason, affect- normal      Results:     Reviewed images:   Old image: proximal fifth phalanx fracture with some displacement and angulation.    New image:  With good healing  Assessment and Plan   Cindy was seen today for recheck.    Diagnoses and all orders for this visit:    Closed displaced fracture of proximal phalanx of lesser toe of right foot with routine healing, subsequent encounter  -     XR TOE RT; Future  Improved  No ru taping required   No limitations     Calculus of gallbladder without cholecystitis without obstruction  -     GENERAL SURGERY ADULT REFERRAL - INTERNAL  - Reviewed Google images for gallstones and cholecystitis.  - Cholelithiasis: Discussed surgical vs. non surgical options. Educated about laparoscopy.   - If she chooses behavioral management she would have to monitor her pain. If she has pain and a fever advised her to go to the ED.    Age-related osteoporosis without current pathological fracture  -     Calcium Carbonate-Vitamin D (CALCIUM-D) 600-400 MG-UNIT TABS; 1 tablet by Oral or Feeding Tube route 2 times daily    Vitamin D deficiency  -     vitamin D3 (CHOLECALCIFEROL) 2000 units tablet; Take 1 tablet by mouth daily    Encounter for screening mammogram for breast cancer  -     MA Screening Digital Bilateral; Future     - Charleen will coordinate enrollment in [facility] for son.    Medications Discontinued During This Encounter   Medication Reason     Calcium Carbonate-Vitamin D (CALCIUM-D) 600-400 MG-UNIT TABS Reorder     Cholecalciferol (VITAMIN D) 2000 units tablet Reorder     ondansetron (ZOFRAN) 4 MG tablet      polyethylene glycol (MIRALAX) powder      amoxicillin-clavulanate (AUGMENTIN) 875-125 MG per tablet        Patient Instructions   Care Coordination  1. Charleen will help you enroll in [facility] today. She can also answer questions regarding who the case  manager is for Osbaldo.    Preventative  1. Referral for mammogram today.  2. I will provide a referral for Vitamin D and calcium today.     Abdominal Pain  1. Referral to Dr. Burk. Talk to Dr. Burk about your back pain.    Options for treatment and follow-up care were reviewed with the patient. Cindy Espinoza  engaged in the decision making process and verbalized understanding of the options discussed and agreed with the final plan.    The information in this document, created by the medical scribe for me, accurately reflects the services I personally performed and the decisions made by me. I have reviewed and approved this document for accuracy prior to leaving the patient care area.    Arleth Phelps MD  4:56 PM, 01/02/19

## 2019-01-08 ENCOUNTER — ANCILLARY PROCEDURE (OUTPATIENT)
Dept: MAMMOGRAPHY | Facility: CLINIC | Age: 59
End: 2019-01-08
Payer: COMMERCIAL

## 2019-01-08 DIAGNOSIS — Z12.31 ENCOUNTER FOR SCREENING MAMMOGRAM FOR BREAST CANCER: ICD-10-CM

## 2019-02-19 ENCOUNTER — OFFICE VISIT (OUTPATIENT)
Dept: SURGERY | Facility: CLINIC | Age: 59
End: 2019-02-19
Attending: FAMILY MEDICINE
Payer: COMMERCIAL

## 2019-02-19 ENCOUNTER — PATIENT OUTREACH (OUTPATIENT)
Dept: SURGERY | Facility: CLINIC | Age: 59
End: 2019-02-19

## 2019-02-19 VITALS
DIASTOLIC BLOOD PRESSURE: 89 MMHG | SYSTOLIC BLOOD PRESSURE: 132 MMHG | TEMPERATURE: 97.5 F | WEIGHT: 163.5 LBS | HEART RATE: 75 BPM | HEIGHT: 64 IN | BODY MASS INDEX: 27.91 KG/M2 | OXYGEN SATURATION: 98 %

## 2019-02-19 DIAGNOSIS — K80.20 GALLSTONES: Primary | ICD-10-CM

## 2019-02-19 RX ORDER — CEFAZOLIN SODIUM 1 G/50ML
1 INJECTION, SOLUTION INTRAVENOUS SEE ADMIN INSTRUCTIONS
Status: CANCELLED | OUTPATIENT
Start: 2019-02-19

## 2019-02-19 RX ORDER — CEFAZOLIN SODIUM 2 G/50ML
2 SOLUTION INTRAVENOUS
Status: CANCELLED | OUTPATIENT
Start: 2019-02-19

## 2019-02-19 ASSESSMENT — ENCOUNTER SYMPTOMS
BACK PAIN: 1
NECK PAIN: 0
ARTHRALGIAS: 0
MUSCLE WEAKNESS: 0
JOINT SWELLING: 0
STIFFNESS: 1
MYALGIAS: 0

## 2019-02-19 ASSESSMENT — MIFFLIN-ST. JEOR: SCORE: 1295.01

## 2019-02-19 ASSESSMENT — PAIN SCALES - GENERAL: PAINLEVEL: NO PAIN (0)

## 2019-02-19 NOTE — NURSING NOTE
"Chief Complaint   Patient presents with     Consult     NEW - Calculus of gallbladder       Vitals:    02/19/19 1003   BP: 132/89   Pulse: 75   Temp: 97.5  F (36.4  C)   TempSrc: Oral   SpO2: 98%   Weight: 74.2 kg (163 lb 8 oz)   Height: 1.615 m (5' 3.58\")       Body mass index is 28.43 kg/m .      Vic Dueñas, EMT on 2/19/2019 at 10:09 AM                          "

## 2019-02-19 NOTE — PROGRESS NOTES
Cindy Espinoza is a 59 year old female with a 1 year history of abdominal pain localized to the right upper quadrant.  Symptoms are associated with fatty food intake.  Associated symptoms: no  Other constitutional symptoms: no  Common duct symptoms:  None  Diet tolerance:  good  Patient was not seen in the Emergency Room for these symptoms.  LFT's:  not addressed    Image studies included:  Ultrasound  Findings:  Gallstones seen    Past medical and surgical history, medications, allergies, family history, and social history were reviewed with the patient. ( kids, 7 alive.  Past Medical History:   Diagnosis Date     Ear pain     Left     Vitamin D deficiency      Past Surgical History:   Procedure Laterality Date     NO HISTORY OF SURGERY       ROS: 10 point review of systems negative except noted in HPI  PHYSICAL EXAM  General appearance- healthy, alert, and in no distress.  Skin- Skin color and turgor normal.  No obvious rashes.  Neck- Neck is supple without obvious adenopathy.  Lungs- Respiratory effort unlabored.  Gait- Normal.  Abdomen - soft non distended, mild tender RUQ    Impression:  Symptomatic cholelithiasis  Recommendation:  Laparoscopic Cholecystectomy and a low to nonfat diet until the patient undergoes surgery.    A full discussion regarding the alternatives, risks, goals, and potential complications for this surgery was completed today.  The patient understood that the potential problems included but are not limited to:  Infection, bleeding, bile leak, injury to structures about the gallbladder, possible common duct stone requiring further procedures. Most current review of literature confirm the more common specific risks related to laparoscopic cholecystectomy include bile duct injury (3/1000), bile leak (10/1000), retained common bile duct stone (10/1000), postcholecystectomy diarrhea (1-2%) and these complications may require additional treatment.    The patient verbally expressed understanding,  was given the opportunity for questions, and gives full informed consent for the procedure.      Today the patient was instructed on the need for a preoperative H&P, NPO status prior to surgery, and the need to stop anticoagulants prior to surgery.  Additional educational material, soap, and instructions will be mailed out to the patients home.    The total time spent with this patient and  was 30 minutes.  Of this time, greater than 50% was spent counseling and coordinating care.      Lovenox:  No          Answers for HPI/ROS submitted by the patient on 2/19/2019   General Symptoms: No  Skin Symptoms: No  HENT Symptoms: No  EYE SYMPTOMS: No  HEART SYMPTOMS: No  LUNG SYMPTOMS: No  INTESTINAL SYMPTOMS: No  URINARY SYMPTOMS: No  GYNECOLOGIC SYMPTOMS: No  BREAST SYMPTOMS: No  SKELETAL SYMPTOMS: Yes  BLOOD SYMPTOMS: No  NERVOUS SYSTEM SYMPTOMS: No  MENTAL HEALTH SYMPTOMS: No  Back pain: Yes  Muscle aches: No  Neck pain: No  Swollen joints: No  Joint pain: No  Bone pain: No  Muscle weakness: No  Joint stiffness: Yes  Bone fracture: No

## 2019-02-19 NOTE — LETTER
2/19/2019       RE: Cindy Espinoza  [address]   [place] [facility] [id number]     Dear Colleague,    Thank you for referring your patient, Cindy Espinoza, to the [facility] GENERAL SURGERY at [facility]. Please see a copy of my visit note below.    Cindy Espinoza is a 59 year old female with a 1 year history of abdominal pain localized to the right upper quadrant.  Symptoms are associated with fatty food intake.  Associated symptoms: no  Other constitutional symptoms: no  Common duct symptoms:  None  Diet tolerance:  good  Patient was not seen in the Emergency Room for these symptoms.  LFT's:  not addressed    Image studies included:  Ultrasound  Findings:  Gallstones seen    Past medical and surgical history, medications, allergies, family history, and social history were reviewed with the patient. ( kids, 7 alive.  Past Medical History:   Diagnosis Date     Ear pain     Left     Vitamin D deficiency      Past Surgical History:   Procedure Laterality Date     NO HISTORY OF SURGERY       ROS: 10 point review of systems negative except noted in HPI  PHYSICAL EXAM  General appearance- healthy, alert, and in no distress.  Skin- Skin color and turgor normal.  No obvious rashes.  Neck- Neck is supple without obvious adenopathy.  Lungs- Respiratory effort unlabored.  Gait- Normal.  Abdomen - soft non distended, mild tender RUQ    Impression:  Symptomatic cholelithiasis  Recommendation:  Laparoscopic Cholecystectomy and a low to nonfat diet until the patient undergoes surgery.    A full discussion regarding the alternatives, risks, goals, and potential complications for this surgery was completed today.  The patient understood that the potential problems included but are not limited to:  Infection, bleeding, bile leak, injury to structures about the gallbladder, possible common duct stone requiring further procedures. Most current review of literature confirm the more common  specific risks related to laparoscopic cholecystectomy include bile duct injury (3/1000), bile leak (10/1000), retained common bile duct stone (10/1000), postcholecystectomy diarrhea (1-2%) and these complications may require additional treatment.    The patient verbally expressed understanding, was given the opportunity for questions, and gives full informed consent for the procedure.      Today the patient was instructed on the need for a preoperative H&P, NPO status prior to surgery, and the need to stop anticoagulants prior to surgery.  Additional educational material, soap, and instructions will be mailed out to the patients home.    The total time spent with this patient and  was 30 minutes.  Of this time, greater than 50% was spent counseling and coordinating care.      Lovenox:  No    Again, thank you for allowing me to participate in the care of your patient.      Sincerely,    Alvaro Burk MD

## 2019-02-19 NOTE — PATIENT INSTRUCTIONS
You met with Dr. Alvaro Burk.      Today's visit instructions:    You will need to see your PCP for a pre op history and physical prior to surgery. This needs to be done within 30 days of your surgery date.        If you have questions please contact Timothy RN or Dali RN during regular clinic hours, Monday through Friday 7:30 AM - 4:00 PM, or you can contact us via Gient at anytime.       If you have urgent needs after-hours, weekends, or holidays please call the hospital at [phone] and ask to speak with our on-call General Surgery Team.    Appointment [phone], option #1   Nurse Advice (Timothy or Dali): [phone]   Surgery Scheduler (Rosalina): [phone]  Fax: [phone]      After your Laparoscopic Cholecystectomy          Incision care     You may take a shower the day after surgery. Carefully wash your incision with soap and water. Do not submerge yourself in water (bath, whirlpool, hot tub, pool, lake) for 14 days after surgery.     Remove the bandage the day after surgery, but leave the medical tape (Steri-Strips) or glue in place. These will loosen and fall off on their own 5 to 7 days after surgery.       Always wash your hands before touching your incisions or removing bandages.     It is not unusual to form a collection of fluid or blood under your incision that may feel firm or squishy- it can take several weeks to months for your body to reabsorb it.  At times, it may even drain.  If that should happen keep the area clean with soap, water,  and cover with a clean gauze dressing. You can change this daily or as needed.       Other medicines     Wait to start aspirin or blood thinners until the day after surgery. You can continue your regular medicines at your normal time the day after surgery.      Your pain medicine may cause constipation (hard, dry stools). To help with this, take the stool softener your doctor gave you or an over-the-counter stool softener or laxative. You  can stop taking this when you are no longer taking pain medicine and your bowel movements are back to normal.      For pain or discomfort     Take the narcotic pain medicine your doctor gave you as needed and as instructed on the bottle. If you prefer to use over-the-counter medication, use acetaminophen (Tylenol) or ibuprofen (Advil, Motrin) as instructed on the box. Do not take Tylenol if it is in your narcotic pain medication.     Use an ice pack on your abdomen (belly) for 20 minutes at a time as needed for the first 24 hours. Be sure to protect your skin by putting a cloth between the ice pack and your skin.     After 24 hours you can switch to heat for 20 minutes as needed. Be sure to protect your skin by putting a cloth between the heat pack and your skin.       Activities     No driving until you feel it s safe to do so. Don t drive while taking narcotic pain medicine.     Don t lift anything heavier than 20 pounds for 3 to 4 weeks after surgery.      Special equipment     If you left the hospital in MICHEEL socks (compression stockings), you may take them off the day after surgery.      Diet     You can eat your regular meals after surgery.      When to call the doctor   Call your doctor if you have:     A fever above 101 F (38.3 C) (taken under the tongue), or a fever or chills lasting more than a day.     Redness at the incision site.     Any fluid or blood draining from the incision, especially if it smells bad.      Severe pain that doesn t improve with pain medicine.          We will call you 2 to 4 days after surgery to review this handout, answer questions and help arrange after-surgery care. If you have questions or concerns, please call [phone] during regular office hours. If you need to call after business hours, call [phone] and ask to page the surgeon on-call.         Transversus Abdominis Plane (TAP) Pain Block      What is a TAP block?   A TAP block can help you manage your pain after  surgery. TAP stands for transversus abdominis plane, which is a muscle layer in your abdomen (belly). The TAP block uses numbing medicine similar to Novocaine to block pain near the site of your surgery.       Why get a TAP block?     To better manage your pain after surgery. A tap block will help keep the pain from getting severe and out of control.     To block pain signals from the nerve, which helps decrease pain after surgery.     To help you sleep, easily breathe deeply, walk and visit with others.      How is it done?   You will lie still on a table. We will use an ultrasound machine to help us see the correct muscle layer of your abdomen. Then, we ll use a needle to inject the medicine. We may also give you some sleep medicine to lessen the pain of the injection.       The procedure takes between 5 and 15 minutes. It is usually done right before surgery, but will sometimes be after. It depends on your surgery and care needs.      What can I expect?     You may feel numbness, tingling or a heaviness in your abdomen.      You may have pain control up to 72 hours after surgery.     The TAP block may not lessen all of your surgery pain. But most patients feel 50 to 75 percent less pain than without the block.       Tell your nurse if you have:     Numbness or tingling in areas other than where the injection was     Blurry vision     Ringing in your ears     A metallic taste in your mouth

## 2019-02-19 NOTE — LETTER
Cindy Espinoza  [address] [facility]   [facility] [address]      SURGERY PACKET            Your surgery is scheduled:    Date: Monday, March 18  ________________________________    Time: 11 am  ________________________________    Please arrive at:  9:30 am  ______________________    Surgeon's Name: Dr. Alvaro Burk  _______________________        Pre-Op Physical Fax Numbers:         [facility] Fax: [phone] / Phone:  [phone]        Your surgery is located at:  [facility]  [address] [place] [address]  [phone]       Before Your Surgery  For Patients and Visitors at [facility]    Welcome  As you get ready for surgery, you may have a lot of questions.  This brochure will help you know what to expect before and after surgery.  You and your family are the most important members of your health care team.  You will need to take an active role in your care.    Be sure to ask questions and learn all that you can about your surgery.  If you have any safety concerns, we urge you to tell a nurse as soon as possible.   This brochure is for information only.  It does not replace the advice of your doctor.  Always follow your doctor's advice.    Please tell us if you need a .    GETTING READY FOR SURGERY  Always follow your surgeon's instructions.  If you don't, your surgery could be canceled.  Please use the following checklist.    Within 30 Days of Surgery:    Have a pre-surgery physical exam with your family doctor or partner.    If you use a [facility], all of your information from the pre-op physical will be in the [employer] computer system.    Ask the doctor to send all of your results to the hospital before the surgery.  The doctor also may ask you to bring the results with you on the day of surgery or you can fax them to [phone].  Tell the doctor if:    You are allergic to latex  or rubber  (Latex and rubber gloves are often used in medical care).    You are taking any medicines (including aspirin), vitamins (Vitamin E, Fish Oil, Omegas) or herbal products.  You will need to stop taking some medicines before surgery.    You have any medical problems (allergies, diabetes or heart disease, for example).    You have a pacemaker or an AICD (automatic implanted cardiac defibrillator).  If you do, please bring the ID card with you on the day of surgery.    You are a smoker.  People who smoke have a higher risk of infection after surgery.  Ask your doctor how you can quit smoking.  Within 7 days of Surgery:    Pre-register with the hospital.  Please use the hospital's phone number listed on the first page of this brochure.  Or, to register online, go to [email or web address].      Prior to your surgical procedure, a nurse will be contacting you to obtain a health history ([phone]).  Additionally, someone from the Admissions Department will also contact you for preregistration ([phone]).      Call your insurance company.  Ask if you need pre-approval for your surgery.  If you do not have insurance, please let us know.      Arrange for someone to drive you home after surgery.  If you will have same-day surgery, you may not drive or take public transportation home by yourself.    Arrange for someone to stay with you for 24 hours after you go home.  This person must be a responsible adult (ie- Family member or friend).  The Day Before Surgery:     Call your surgeon if there are any changes in your health.  This includes signs of a cold or flu (such as a sore throat, runny nose, cough, rash or fever).    Do not smoke, drink alcohol or take over-the-counter medicine (unless your surgeon tells you to) for 24 hours before and after surgery.    If you take prescribed drugs:  You may need to stop them until after the surgery.  Follow your doctor's orders.  You may resume Aspirin and/or blood  thinners after your surgery as directed by your physician/surgeon.    NO FOOD OR DRINK AFTER MIDNIGHT.  Follow your surgeon's orders for eating and drinking.  You need to have an empty stomach before surgery.  This will make the surgery as safe as possible.  If you don't follow your doctor's orders, your surgery could be changed to another date.  A nurse will call you within a few days of surgery to go over these and other instructions.  If you do not hear from them, please call them at [facility] ([facility]) Fax: [phone] / Phone:  [phone]  The Day of Surgery:    Take a shower or bath the night before and the morning of surgery.  Use antiseptic soap or the soap your surgeon gave you.  Gently clean the skin.  Do not shave or scrub your surgery site.    Please remove deodorant, cologne, scented lotion, makeup, nail polish and jewelry (including rings and body piercings).  If you wear artificial nails, please remove at least one nail before coming to the hospital.    Wear clean, loose clothing to the hospital.    Bring these items to the hospital:  1. Your insurance card.  2. Money for parking and co-pays (for medicines or the surgery), if needed.   3. A list of all the medicines you take.  Include vitamins, minerals, herbs and over-the-counter drugs.  Note any drug allergies.  4. A copy of your advance health care directive, if you have one.  This tells us what treatment you would want -- and who would make health care decisions -- if you could no longer speak for yourself.  You may request this form in advance or download it from [email or web address].  5. A case for any glasses, contact lenses, hearing aids or dentures.  6. Your inhaler or CPAP machine, if you use these at home.  Leave extra cash, jewelry and other valuables at home.    When You Arrive:  When you get to the hospital, you will:    Check in.  If you are under age 18, you must be with a parent or legal guardian.    Sign consent  forms, if you haven't already.  These forms state that you know the risks and benefits of surgery.  When you sign the forms, you give us permission to do the surgery.  Do not sign them unless you understand what will happen during and after your surgery.  If you have any questions about your surgery, ask to speak with your doctor before you sign the forms.  If you don't understand the answers, ask again.    Receive a copy of the Patients Bill of Rights.  If you do not receive a copy, please ask for one.    Change into hospital clothes.  Your belongings will be placed in a bag.  We will return them to you after surgery.    Meet with the anesthesia provider.  He or she will tell you what kind of anesthesia (medicine) will be used to keep you comfortable during surgery.  Remember: It's okay to remind doctors and nurses to wash their hands before touching you.   In most cases, your surgeon will use a marker to write his or her initials on the surgery site.  This ensures that the exact site is operated on.  For safety reasons, we will ask you the same questions many times.  For example, we may ask your name and birth date over and over again.  Friends and family can stay with you until it's time for surgery.  While you're in surgery, they will be in the waiting area.  Please note that cell phones are not allowed in some patient care areas.  If you have questions about what will happen in the operating room, talk to your care team.  After Surgery:    We will move you to a recovery room where we will watch you closely.  If you have any pain or discomfort, tell your nurse.  He or she will try to make you comfortable.      If you are staying overnight we will move you to your hospital room after you are awake.    If you are going home we will move you to another room.  Friends and family may be able to join you.  The length of time you spend in recovery depends on the type of medicine you received, your medical condition,  "and the type of surgery you had.  Dealing with pain:  A nurse will check your comfort level often during your stay.  He or she will work with you to manage your pain.  Remember:    All pain is real.  There are many ways to control pain.  We can help you decide what works best for you.    Ask for pain medicine when you need it.  Don't try to \"tough it out\" -- this can make you feel worse.  Always take your medicine as ordered.    Medicine doesn't work the same for everyone.  If your medicine isn't working tell your nurse.  There may be other medicines or treatments we can try.  Going Home:  We will let you know when you're ready to leave the hospital.  Before you leave, we will tell you how to care for yourself at home and prevent infections.  If you do not understand something, please say so.  We will answer any questions you have.  We will then help you get ready to leave.  You must have an adult with you for the first 24 hours after you leave the hospital. Take it easy when you get home.  You will need some time to recover -- you may be more tired than you realize at first.  Rest and relax for at least the first 24 hours at home.  You'll feel better and heal faster if you take good care of yourself.                      Pre-Operative Surgery Scrub    Purpose:   The skin harbors a large variety of bacteria, both infectious and noninfectious.  Showering with an antiseptic soap prior to an invasive procedure will decrease the number of transient and resident (good and bad) bacteria that is normally found on the skin.    Procedure:      Shower or bathe with 1/2 of the bottle of antiseptic soap (enclosed) the evening before and 1/2 of the bottle the morning of surgery (bathing the day of the procedure is most important).       Apply the soap at full strength (use the entire bottle).  Gently clean the skin, rinse, and dry with a clean towel that is freshly laundered (out of the dryer) and then put on clean clothing that " is freshly laundered.        We have given you information regarding pre-op showering.  We recommend that patients wash with an antiseptic soap prior to surgery.  This cleanser will be given to you at the clinic or mailed to your home.  It is advised that you liberally wash the specific area surgery area the night before, and again in the morning before the surgery.  Do not apply lotion afterward.  We would like to keep the skin as clean as possible.    Thank you for following these important instructions.      You have been scheduled for surgery and we would like to give you some information that will assist in helping get the best possible outcome.      Before Surgery:   If for any reason you decide not to have the surgery, please contact our office.  We can easily cancel or reschedule the procedure. Please call the  at [phone].      Any pain related to the surgery that occurs before the surgery needs to be reported and managed by your primary care or referring doctor.      Please keep in mind that the time of surgery is subject to change.  Make sure you have nothing to eat or drink after midnight.  If your surgery is later in the afternoon, this recommendation might change, but not until the day before surgery after the actual time of the surgery has been established.    After Surgery:  When you are discharged from the recovery room, the nurses will review instructions with you and your caregiver.      Please wash your hands every time you touch the wound or change bandages or dressings.      Do not submerge the wound in water.  You may not use a bathtub or hot tub until the wound is closed.  The wait time frame is generally 2-3 weeks but any open area can be a source of incoming bacteria, so it is better to be on the safe side and avoid the tub until your wound is fully healed.      You may take a shower 24 hours after surgery.  Double check with your surgeon if it is ok for water to run over a  wound, whether it has been sutured, stapled, glued or is open.  You may gently wash the wound using the antiseptic soap provided for your pre-surgery showering (do not use a washcloth).  Any mild soap will work as well.      Many surgical wounds will have small white strips of tape on them called Steri Strips.  Do not remove these.  The edges will curl and fall off within 7-10 days with normal showering.      If you are going home with sutures (stitches) or staples, you must return to the clinic to have them taken out, usually within 1-2 weeks.      Signs and symptoms of infection include:  1. Fever, temperature over 101.5 ' F  2. Redness  3. Swelling  4. Increasing pain  5. Green or yellow drainage which may or may not have a foul odor.    Symptoms of infection need to be reported to your surgery office. Please call the nurse at [phone].   If you have had surgery with Dr. Fairchild or Dr. Pendleton please call [phone] (option # 4).    If you or your  are deaf or hard of hearing, or prefer a language other than English, please let us know.  We have many free services, including interpreters and other aids to help you communicate. You may ask for help  through any member of your care team or by calling Language Services at [phone], option 2.

## 2019-02-19 NOTE — PROGRESS NOTES
Pre and Post op Patient Education/Teaching Flowsheet  Relevant Diagnosis:  Biliary colic  Teaching Topic:  Pre and post op teaching  Person(s) Involved in teaching:  Patient and Iranian     Motivation Level:  Asks Questions:  Yes  Eager to Learn:  Yes  Cooperative:  Yes  Receptive (willing/able to accept information):  Yes  Any cultural factors/Congregation beliefs that may influence understanding or compliance?  No    Patient/caregiver/family demonstrates understanding of the following:  Reason for the appointment, diagnosis, and treatment plan:  Yes  Patient demonstrates understanding of the following:  Pre-op bowel prep:  No  Post-op pain management recommendations (medications, ice compress, binder/athletic supporter (if applicable), etc.:  Yes  Inguinal hernia patients:  Post-op urinary retention- discussed signs/symptoms and visit to ER for Miller catheter placement and to stay in place for at least 48 hours:  NA  Restrictions:  Yes  Medications to take the day of surgery:  Per PCP  Blood thinner medications discussed and when to stop (if applicable):  Yes  Wound care:  Yes  Diabetes medication management (if applicable):  Per PCP  Which situations necessitate calling provider and whom to contact:  Discussed how to contact the hospital, nurse, and clinic scheduling staff if necessary      Date and time of surgery:  Yes  Location of surgery: [facility]  History and Physical and any other testing necessary prior to surgery:  Yes  Required time line for completion of History and Physical and any pre-op testing:  Yes  Discuss need for someone to drive patient home and stay with them for 24 hours:  Yes  Pre-op showering/scrub information with Surgical Scrub:  Yes  NPO Guidelines:  NPO per Anesthesia Guidelines    Infection Prevention: Patient demonstrates understanding of the following:  Patient instructed on hand hygiene:  Yes  Surgical procedure site care will  be taught and will be reviewed at the time of discharge  Signs and symptoms of infection taught:  Yes  Wound care reviewed and will be taught at the time of discharge  Central venous catheter care will be taught at the time of discharge (if applicable)    Post-op follow-up:  Instructional materials used/given/mailed:  [facility] Booklet, post op teaching sheet, Map, Soap, and arrival/location information    Surgical instructions mailed to patient

## 2019-03-04 ENCOUNTER — DOCUMENTATION ONLY (OUTPATIENT)
Dept: SURGERY | Facility: CLINIC | Age: 59
End: 2019-03-04

## 2019-05-28 ENCOUNTER — TRANSFERRED RECORDS (OUTPATIENT)
Dept: HEALTH INFORMATION MANAGEMENT | Facility: CLINIC | Age: 59
End: 2019-05-28

## 2019-06-18 ENCOUNTER — TRANSFERRED RECORDS (OUTPATIENT)
Dept: HEALTH INFORMATION MANAGEMENT | Facility: CLINIC | Age: 59
End: 2019-06-18

## 2019-06-20 ENCOUNTER — OFFICE VISIT (OUTPATIENT)
Dept: FAMILY MEDICINE | Facility: CLINIC | Age: 59
End: 2019-06-20
Payer: COMMERCIAL

## 2019-06-20 VITALS
OXYGEN SATURATION: 97 % | TEMPERATURE: 97.7 F | BODY MASS INDEX: 27.83 KG/M2 | DIASTOLIC BLOOD PRESSURE: 84 MMHG | WEIGHT: 160 LBS | SYSTOLIC BLOOD PRESSURE: 127 MMHG | HEART RATE: 69 BPM

## 2019-06-20 DIAGNOSIS — Z12.11 SCREENING FOR COLON CANCER: ICD-10-CM

## 2019-06-20 DIAGNOSIS — G89.29 CHRONIC BILATERAL LOW BACK PAIN WITHOUT SCIATICA: Primary | ICD-10-CM

## 2019-06-20 DIAGNOSIS — M54.50 CHRONIC BILATERAL LOW BACK PAIN WITHOUT SCIATICA: Primary | ICD-10-CM

## 2019-06-20 RX ORDER — GABAPENTIN 100 MG/1
CAPSULE ORAL
Qty: 99 CAPSULE | Refills: 1 | Status: SHIPPED | OUTPATIENT
Start: 2019-06-20 | End: 2019-10-23

## 2019-06-20 NOTE — PATIENT INSTRUCTIONS
Back Pain  1. I recommend physical therapy.   2. Prescribed gabapentin today. Take one tablet once a day at night for three days. Then increase to two tablets once a day at night for three days. Then increase to three tablets once a day a night for one month.  - You may experience drowsiness on this medication.  3. Follow up with me in one month.

## 2019-06-20 NOTE — NURSING NOTE
Due to patient being non-English speaking/uses sign language, an  was used for this visit. Only for face-to-face interpretation by an external agency, date and length of interpretation can be found on the scanned worksheet.     name: Marcelo Newell  Language: Citizen of the [place]   Agency: [employer]   Phone number: [phone]  Type of interpretation: Face-to-face, spoken

## 2019-06-23 DIAGNOSIS — Z12.11 SPECIAL SCREENING FOR MALIGNANT NEOPLASMS, COLON: ICD-10-CM

## 2019-06-23 PROCEDURE — 82274 ASSAY TEST FOR BLOOD FECAL: CPT | Performed by: FAMILY MEDICINE

## 2019-06-26 DIAGNOSIS — Z12.11 SPECIAL SCREENING FOR MALIGNANT NEOPLASMS, COLON: Primary | ICD-10-CM

## 2019-06-27 LAB — HEMOCCULT STL QL IA: NEGATIVE

## 2019-10-23 ENCOUNTER — OFFICE VISIT (OUTPATIENT)
Dept: FAMILY MEDICINE | Facility: CLINIC | Age: 59
End: 2019-10-23
Payer: COMMERCIAL

## 2019-10-23 VITALS
OXYGEN SATURATION: 95 % | HEIGHT: 63 IN | WEIGHT: 164.2 LBS | SYSTOLIC BLOOD PRESSURE: 129 MMHG | HEART RATE: 79 BPM | BODY MASS INDEX: 29.09 KG/M2 | RESPIRATION RATE: 16 BRPM | TEMPERATURE: 97.7 F | DIASTOLIC BLOOD PRESSURE: 81 MMHG

## 2019-10-23 DIAGNOSIS — H04.123 DRY EYES: ICD-10-CM

## 2019-10-23 DIAGNOSIS — E21.3 HYPERPARATHYROIDISM (H): ICD-10-CM

## 2019-10-23 DIAGNOSIS — M54.41 CHRONIC BILATERAL LOW BACK PAIN WITH RIGHT-SIDED SCIATICA: ICD-10-CM

## 2019-10-23 DIAGNOSIS — G89.29 CHRONIC BILATERAL LOW BACK PAIN WITH RIGHT-SIDED SCIATICA: ICD-10-CM

## 2019-10-23 DIAGNOSIS — K59.00 CONSTIPATION, UNSPECIFIED CONSTIPATION TYPE: ICD-10-CM

## 2019-10-23 DIAGNOSIS — Z63.8 CAREGIVER ROLE STRAIN: ICD-10-CM

## 2019-10-23 DIAGNOSIS — M79.7 FIBROMYALGIA: Primary | ICD-10-CM

## 2019-10-23 LAB
BUN SERPL-MCNC: 13.3 MG/DL (ref 7–19)
CA-I BLD-MCNC: 5 MG/DL (ref 4.4–5.2)
CALCIUM SERPL-MCNC: 9.6 MG/DL (ref 8.5–10.1)
CHLORIDE SERPLBLD-SCNC: 102.3 MMOL/L (ref 98–110)
CO2 SERPL-SCNC: 32.3 MMOL/L (ref 20–32)
CREAT SERPL-MCNC: 0.7 MG/DL (ref 0.5–1)
GFR SERPL CREATININE-BSD FRML MDRD: >90 ML/MIN/1.7 M2
GLUCOSE SERPL-MCNC: 104 MG'DL (ref 70–99)
POTASSIUM SERPL-SCNC: 3.8 MMOL/DL (ref 3.3–4.5)
PTH-INTACT SERPL-MCNC: 96 PG/ML (ref 18–80)
SODIUM SERPL-SCNC: 134.3 MMOL/L (ref 132.6–141.4)

## 2019-10-23 RX ORDER — ACETAMINOPHEN 500 MG
1000 TABLET ORAL EVERY 8 HOURS PRN
Qty: 100 TABLET | Refills: 11 | Status: SHIPPED | OUTPATIENT
Start: 2019-10-23 | End: 2020-07-24

## 2019-10-23 RX ORDER — AMOXICILLIN 250 MG
2-3 CAPSULE ORAL
Qty: 180 TABLET | Refills: 3 | Status: SHIPPED | OUTPATIENT
Start: 2019-10-23 | End: 2020-07-24

## 2019-10-23 RX ORDER — CYCLOSPORINE 0.5 MG/ML
1 EMULSION OPHTHALMIC EVERY 4 HOURS PRN
Refills: 3
Start: 2019-10-23 | End: 2020-07-24

## 2019-10-23 RX ORDER — NORTRIPTYLINE HCL 10 MG
CAPSULE ORAL
Qty: 67 CAPSULE | Refills: 0 | Status: SHIPPED | OUTPATIENT
Start: 2019-10-23 | End: 2019-11-18

## 2019-10-23 SDOH — SOCIAL STABILITY - SOCIAL INSECURITY: OTHER SPECIFIED PROBLEMS RELATED TO PRIMARY SUPPORT GROUP: Z63.8

## 2019-10-23 ASSESSMENT — MIFFLIN-ST. JEOR: SCORE: 1288.94

## 2019-10-23 ASSESSMENT — PAIN SCALES - GENERAL: PAINLEVEL: SEVERE PAIN (6)

## 2019-10-23 NOTE — LETTER
October 28, 2019      Cindy Espinoza  [address] [place] [address]   [facility] [address]        Dear Cindy,    Thank you for getting your care at [facility]. Please see below for your test results.    Resulted Orders   Basic Metabolic Panel ([facility])   Result Value Ref Range    Calcium 9.6 8.5 - 10.1 mg/dL    Chloride 102.3 98.0 - 110.0 mmol/L    Carbon Dioxide 32.3 (H) 20.0 - 32.0 mmol/L    Creatinine 0.7 0.5 - 1.0 mg/dL    Glucose 104.0 (H) 70.0 - 99.0 mg'dL    Potassium 3.8 3.3 - 4.5 mmol/dL    Sodium 134.3 132.6 - 141.4 mmol/L    GFR Estimate >90 >60.0 mL/min/1.7 m2    GFR Estimate If Black >90 >60.0 mL/min/1.7 m2    Urea Nitrogen 13.3 7.0 - 19.0 mg/dL   Parathyroid Hormone Intact   Result Value Ref Range    Parathyroid Hormone Intact 96 (H) 18 - 80 pg/mL   Vitamin D Level   Result Value Ref Range    Vitamin D Deficiency screening 33 20 - 75 ug/L      Comment:      Season, race, dietary intake, and treatment affect the concentration of   25-hydroxy-Vitamin D. Values may decrease during winter months and increase   during summer months. Values 20-29 ug/L may indicate Vitamin D insufficiency   and values <20 ug/L may indicate Vitamin D deficiency.  Vitamin D determination is routinely performed by an immunoassay specific for   25 hydroxyvitamin D3.  If an individual is on vitamin D2 (ergocalciferol)   supplementation, please specify 25 OH vitamin D2 and D3 level determination by   LCMSMS test VITD23.     Calcium ionized whole blood   Result Value Ref Range    Calcium Ionized Whole Blood 5.0 4.4 - 5.2 mg/dL         Your calcium level is normal, which is the most important thing. I would want you to see the endocrine doctor IF IT GETS TOO HIGH. We will watch every 6 months for this.     Sincerely,    Arleth Phelps MD

## 2019-10-23 NOTE — PATIENT INSTRUCTIONS
Preventative  1. I recommend getting your flu shot this year.  2. Ordered labs today. Results via letter.    Fibromyalgia  1. Discontinued gabapentin today.   2. Prescribed nortriptyline. Take one tablet at night for one week, and then increase to two tablets at night.    Back Pain  1. Discontinue capsaicin cream today.  2. Prescribed BioFreeze today. Apply to the affected area three times a day as needed.    Constipation  1. Continue senna. You can take up to three tablets of senna.  - Provided refill on senna today.    Dry Eyes  1. Refilled Restasis eye drops. Place one drop into both eyes every 4 hours as needed.

## 2019-10-23 NOTE — NURSING NOTE
Due to patient being non-English speaking/uses sign language, an  was used for this visit. Only for face-to-face interpretation by an external agency, date and length of interpretation can be found on the scanned worksheet.     name: Ralph Burleson   Agency: [employer]  Language: Haitian   Telephone number: [phone]  Type of interpretation: Face-to-face, spoken    Carisa Rahman CMA

## 2019-10-23 NOTE — PROGRESS NOTES
"       HPI   Cindy Espinoza is a 59 year old  who presents for   Chief Complaint   Patient presents with     Refill Request     Patient is here for medication refill      Back Pain  Reports she no longer wants to increase on gabapentin because she states it's not working. Would like to discuss alternative medications for pain relief. Discontinued med after she ran out of rx. Is constipated, but eats a lot of vegetables and cereal with raisin for constipation, with improvement. Has used MiraLax in the past but discontinued because of a strange smell. Two tablets of senna improves constipation.     Medication Reconciliation  Doesn't need acid reflux medication because she's been asymptomatic. Does not want to take ibuprofen because she worries about impact on kidney health, is okay with Tylenol. Needs refill on Tylenol. Doesn't like capsaicin cream because it was too intense.     Has questions about feeding tube procedure for Osbaldo. Wonders about a mary jo lift at home for son.      A [employer]  was used for  this visit.    +++++++    Problem, Medication and Allergy Lists were reviewed and updated if needed.    Patient is an established patient of this clinic.         Review of Systems:   Review of Systems    Positive for: constipation,    See HPI for additional sx.    This document serves as a record of the services and decisions personally performed and made by Arleth Phelps MD. It was created on his/her behalf by Marcus Isbell, a trained medical scribe. The creation of this document is based the provider's statements to the medical scribe.  Mario Isbell 2:58 PM, October 23, 2019       Physical Exam:     Vitals:    10/23/19 1428   BP: 129/81   Pulse: 79   Resp: 16   Temp: 97.7  F (36.5  C)   TempSrc: Oral   SpO2: 95%   Weight: 74.5 kg (164 lb 3.2 oz)   Height: 1.6 m (5' 3\")     Body mass index is 29.09 kg/m .  Vitals were reviewed and were normal     Physical Exam    BMI= Body mass index is 29.09 kg/m . "   GENERAL: healthy, alert and no distress  PSYCH: affect- normal    Results:   Results are ordered and pending    Assessment and Plan   Cindy was seen today for refill request.    Diagnoses and all orders for this visit:    Fibromyalgia  -     nortriptyline (PAMELOR) 10 MG capsule; Take 1 capsule (10 mg) by mouth At Bedtime for 7 days, THEN 2 capsules (20 mg) At Bedtime.    Constipation, unspecified constipation type  -     senna-docusate (SENOKOT-S/PERICOLACE) 8.6-50 MG tablet; Take 2-3 tablets by mouth nightly as needed for constipation    Chronic bilateral low back pain with right-sided sciatica  -     acetaminophen (TYLENOL) 500 MG tablet; Take 2 tablets (1,000 mg) by mouth every 8 hours as needed for pain  -     Menthol, Topical Analgesic, (BIOFREEZE ROLL-ON) 4 % GEL; Apply to painful area of back three times daily as needed    Dry eyes  -     RESTASIS 0.05 % ophthalmic emulsion; Place 1 drop into both eyes every 4 hours as needed for dry eyes    Hyperparathyroidism (H)  -     Basic Metabolic Panel ([facility])  -     Cancel: Calcium ionized  -     Parathyroid Hormone Intact  -     Vitamin D Level  - Hx of hyperparathyroidism, saw endocrine Dr. Akilah Davila at [facility] in Feb 2018, when she was seeking second opinion after previous recommendation at the  to have parathyroiud adenoma surgically removed. He recommended Q6 monthsPTH, ionized calcium, and serum calcium.  - The last calcium was 10.9 with ionized of 1.45, which was only very mildly high. PTH was 92, and US of the thyroid was normal without any notation of thyroid abnormality.     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 Met with SW to determine next steps.      Medications Discontinued During This Encounter   Medication Reason     gabapentin (NEURONTIN) 100 MG capsule Stopped by Patient     ranitidine (ZANTAC) 150 MG tablet Stopped by Patient     ibuprofen  (ADVIL/MOTRIN) 600 MG tablet Stopped by Patient     capsaicin (ZOSTRIX) 0.025 % CREA cream Stopped by Patient     senna-docusate (SENOKOT-S;PERICOLACE) 8.6-50 MG per tablet      acetaminophen (TYLENOL) 500 MG tablet Reorder     RESTASIS 0.05 % ophthalmic emulsion Reorder       Patient Instructions   Preventative  1. I recommend getting your flu shot this year.  2. Ordered labs today. Results via letter.    Fibromyalgia  1. Discontinued gabapentin today.   2. Prescribed nortriptyline. Take one tablet at night for one week, and then increase to two tablets at night.    Back Pain  1. Discontinue capsaicin cream today.  2. Prescribed BioFreeze today. Apply to the affected area three times a day as needed.    Constipation  1. Continue senna. You can take up to three tablets of senna.  - Provided refill on senna today.    Dry Eyes  1. Refilled Restasis eye drops. Place one drop into both eyes every 4 hours as needed.    Options for treatment and follow-up care were reviewed with the patient. Cindy Espinoza  engaged in the decision making process and verbalized understanding of the options discussed and agreed with the final plan.    The information in this document, created by the medical scribe for me, accurately reflects the services I personally performed and the decisions made by me. I have reviewed and approved this document for accuracy prior to leaving the patient care area.    Arleth Phelps MD  2:58 PM, 10/23/19  End Time: 3:20 PM

## 2019-10-24 LAB — DEPRECATED CALCIDIOL+CALCIFEROL SERPL-MC: 33 UG/L (ref 20–75)

## 2019-11-18 ENCOUNTER — OFFICE VISIT (OUTPATIENT)
Dept: FAMILY MEDICINE | Facility: CLINIC | Age: 59
End: 2019-11-18
Payer: COMMERCIAL

## 2019-11-18 VITALS
OXYGEN SATURATION: 94 % | SYSTOLIC BLOOD PRESSURE: 118 MMHG | WEIGHT: 166 LBS | TEMPERATURE: 97.6 F | BODY MASS INDEX: 29.41 KG/M2 | RESPIRATION RATE: 16 BRPM | HEIGHT: 63 IN | HEART RATE: 90 BPM | DIASTOLIC BLOOD PRESSURE: 79 MMHG

## 2019-11-18 DIAGNOSIS — R10.11 RUQ ABDOMINAL PAIN: ICD-10-CM

## 2019-11-18 DIAGNOSIS — M79.7 FIBROMYALGIA: Primary | ICD-10-CM

## 2019-11-18 RX ORDER — NORTRIPTYLINE HYDROCHLORIDE 50 MG/1
50 CAPSULE ORAL AT BEDTIME
Qty: 30 CAPSULE | Refills: 3 | Status: SHIPPED | OUTPATIENT
Start: 2019-11-18 | End: 2020-07-23

## 2019-11-18 ASSESSMENT — MIFFLIN-ST. JEOR: SCORE: 1297.1

## 2019-11-18 ASSESSMENT — PAIN SCALES - GENERAL: PAINLEVEL: SEVERE PAIN (7)

## 2019-11-18 NOTE — PATIENT INSTRUCTIONS
Fibromyalgia  1. Increased your nortriptyline dose to 50mg today. Take one capsule at bedtime night.  2. Prescribed BioFreeze. Apply over the affected area up to three times a day as needed.    Back Pain  1. Warming up with hot packs, hot shower, and aerobic exercise can be helpful for pain.    Right-Sided Pain  1. Follow up sooner with me or at the ED if you develop a skin rash, or severe pain.

## 2019-11-18 NOTE — PROGRESS NOTES
"       HPI   Cindy Espinoza is a 59 year old  who presents for   Chief Complaint   Patient presents with     Medication Follow-up     Patient is here for medication follow up, complaining of lower back and right side of abdominal pain for x2days      Diabetes  Reports she would like a new glucometer, bought it without insurance, doesn't think it has warranty on it.    Fibromyalgia  Has been taking nortriptyline, increased to 20 at night without improvement. Has not tried BioFreeze for back pain though it was prescribed last visit. Denies constipation, dry mouth, changes in sleep. Endorses dry eyes, but is using eye drops with improvement.  Was with son in hospital for 10 days recently.     MSK  Has right-sided pinching flank pain that wraps around to the front, she worries it's r/t kidneys. Last BM was yesterday, stools were not difficult to pass. Pain is  tolerable sitting, better laying, but worse on the right. Denies dysuria, hematuria.       A [employer]  was used for  this visit.    +++++++    Problem, Medication and Allergy Lists were reviewed and updated if needed.    Patient is an established patient of this clinic.         Review of Systems:   Review of Systems    Positive for: back pain, dry eyes, right sided pain that wraps around to the front.    Negative for: constipation, dry mouth, dysuria, changes in bowel habits, hematuria    See HPI for additional sxs.    This document serves as a record of the services and decisions personally performed and made by Arleth Phelps MD. It was created on his/her behalf by Marcus Isbell, a trained medical scribe. The creation of this document is based the provider's statements to the medical scribe.  Scribe Marcus Isbell 11:46 AM, November 18, 2019       Physical Exam:     Vitals:    11/18/19 1044   BP: 118/79   Pulse: 90   Resp: 16   Temp: 97.6  F (36.4  C)   TempSrc: Oral   SpO2: 94%   Weight: 75.3 kg (166 lb)   Height: 1.6 m (5' 3\")     Body mass index is " 29.41 kg/m .  Vitals were reviewed and were normal.     Physical Exam    BMI= Body mass index is 29.41 kg/m .   GENERAL: healthy, alert and no distress  ABDOMEN: soft, no tenderness, no hepatosplenomegaly, no masses, normal bowel sounds, no rebound or guarding, but experiences pain when engages abdominal muscles to sit up on bench. No hernia.  PSYCH: affect- normal    Results:   No testing ordered today    Assessment and Plan   Cindy was seen today for medication follow-up.    Diagnoses and all orders for this visit:    Fibromyalgia  -     Menthol, Topical Analgesic, (BIOFREEZE) 4 % GEL; Externally apply topically 3 times daily as needed (pain)  -     nortriptyline (PAMELOR) 50 MG capsule; Take 1 capsule (50 mg) by mouth At Bedtime  Chronic back pain with this - hard for her to treat since providing 24 care for her disabled son. Would benefift from PT but hard for heto do.     RUQ abdominal pain  - Favors more benign pathology, without hepatosplenomegaly and without tendermess on exam and positional pain - hurts w/ use of abdominal muscles.  She did seen [facility] in March for biliary disease and was rec to have a lap jose elias. By Care everywhere, she had this done at [facility] for RUQ pain that wrapped around to the back, which is very consistent with her current sx.   She is going to try topical treatment and f/u.   Consider LFT's and lipase if continues    Medications Discontinued During This Encounter   Medication Reason     nortriptyline (PAMELOR) 10 MG capsule      Patient Instructions   Fibromyalgia  1. Increased your nortriptyline dose to 50mg today. Take one capsule at bedtime night.  2. Prescribed BioFreeze. Apply over the affected area up to three times a day as needed.    Back Pain  1. Warming up with hot packs, hot shower, and aerobic exercise can be helpful for pain.    Right-Sided Pain  1. Follow up sooner with me or at the ED if you develop a skin rash, or severe pain.    Options for treatment and  follow-up care were reviewed with the patient. Cindy Espinoza  engaged in the decision making process and verbalized understanding of the options discussed and agreed with the final plan.    The information in this document, created by the medical scribe for me, accurately reflects the services I personally performed and the decisions made by me. I have reviewed and approved this document for accuracy prior to leaving the patient care area.    Arleth Phelps MD

## 2019-11-18 NOTE — NURSING NOTE
Due to patient being non-English speaking/uses sign language, an  was used for this visit. Only for face-to-face interpretation by an external agency, date and length of interpretation can be found on the scanned worksheet.     name: Sarahi Olmstead  Language: Palestinian  Agency: [employer]  Phone number: [phone]  Type of interpretation: Face-to-face, spoken      Carisa Rahman CMA

## 2020-02-03 ENCOUNTER — APPOINTMENT (OUTPATIENT)
Dept: INTERPRETER SERVICES | Facility: CLINIC | Age: 60
End: 2020-02-03
Payer: COMMERCIAL

## 2020-07-22 ENCOUNTER — TELEPHONE (OUTPATIENT)
Dept: FAMILY MEDICINE | Facility: CLINIC | Age: 60
End: 2020-07-22

## 2020-07-22 DIAGNOSIS — M79.7 FIBROMYALGIA: ICD-10-CM

## 2020-07-22 NOTE — LETTER
July 23, 2020      Cindy Espinoza  [address] [place] [address]   [facility] [address]        Dear Cindy,      A request for a refill on your Nortryptiline prescription was sent to us from your pharmacy. I have notified the pharmacy to allow you one more refill. It is time for your recheck at the clinic so we can monitor the medication and continue to prescribe it safely. Please make clinic appointment with any provider at [facility] in the next one month    Thank you for choosing us as your healthcare provider.      Sincerely,    Earnestine Ruelas MD

## 2020-07-22 NOTE — TELEPHONE ENCOUNTER
[facility] phone call message-patient reporting a symptom:     Symptom: back pain dizzy fatige    When did symptoms begin? Last 2 days    Characteristics: (location on body, intensity, what makes it better or worse, associated symptoms):      Additional Details:       Same Day Visit Offered: Yes, not availble    Additional comments: pt her daughter called like to speak with triage nurse regarding pt symptom last two days not feeling  well feel dizzy fatigue would you please call    OK to leave message on voice mail? Yes    Advised patient that RN would call back within 3 hours, unless emergent   Primary language: French      needed? Yes    Call taken on July 22, 2020 at 1:46 PM by Manuel Isbell

## 2020-07-22 NOTE — TELEPHONE ENCOUNTER
"Request for medication refill: nortriptyline (PAMELOR) 50 MG capsule     Providers if patient needs an appointment and you are willing to give a one month supply please refill for one month and  send a letter/MyChart using \".SMILLIMITEDREFILL\" .smillimited and route chart to \"[facility] \" (Giving one month refill in non controlled medications is strongly recommended before denial)    If refill has been denied, meaning absolutely no refills without visit, please complete the smart phrase \".smirxrefuse\" and route it to the \"[facility] MED REFILLS\"  pool to inform the patient and the pharmacy.    Nataliya Hogue, [facility]        "

## 2020-07-22 NOTE — LETTER
Cindy Espinoza,    After several unsuccessful attempts to reach you by phone we are sending you this letter. Please call the clinic at [phone], thank you!    Martina BARLOW RN

## 2020-07-22 NOTE — TELEPHONE ENCOUNTER
With aid of  # [id number] left a message for patient to gather more information on her symptoms.    When patient returns call please transfer to any available RN, thank you!    Martina Alvarado RN

## 2020-07-23 RX ORDER — NORTRIPTYLINE HYDROCHLORIDE 50 MG/1
50 CAPSULE ORAL AT BEDTIME
Qty: 30 CAPSULE | Refills: 0 | Status: SHIPPED | OUTPATIENT
Start: 2020-07-23 | End: 2020-07-24

## 2020-07-23 NOTE — TELEPHONE ENCOUNTER
With aid of  # [id number] left a message to return call to gather more information.    No answer after 2 attempts, letter sent.    Martina Alvarado RN

## 2020-07-23 NOTE — TELEPHONE ENCOUNTER
1 month of nortryptiline reordered    Patient needs video visit or in-person visit within the next 1 month, especially given recent reported symptoms (back pain, dizziness, fatigue) which could be caused or relieved by this medication. Will send letter.     -Earnestine Ruelas MD

## 2020-07-24 ENCOUNTER — OFFICE VISIT (OUTPATIENT)
Dept: FAMILY MEDICINE | Facility: CLINIC | Age: 60
End: 2020-07-24

## 2020-07-24 VITALS
SYSTOLIC BLOOD PRESSURE: 120 MMHG | RESPIRATION RATE: 16 BRPM | BODY MASS INDEX: 29.41 KG/M2 | HEIGHT: 63 IN | WEIGHT: 166 LBS | HEART RATE: 71 BPM | OXYGEN SATURATION: 96 % | TEMPERATURE: 97.3 F | DIASTOLIC BLOOD PRESSURE: 83 MMHG

## 2020-07-24 DIAGNOSIS — E55.9 VITAMIN D DEFICIENCY: ICD-10-CM

## 2020-07-24 DIAGNOSIS — M54.9 BACK PAIN, UNSPECIFIED BACK LOCATION, UNSPECIFIED BACK PAIN LATERALITY, UNSPECIFIED CHRONICITY: ICD-10-CM

## 2020-07-24 DIAGNOSIS — M54.41 CHRONIC BILATERAL LOW BACK PAIN WITH RIGHT-SIDED SCIATICA: ICD-10-CM

## 2020-07-24 DIAGNOSIS — M79.7 FIBROMYALGIA: ICD-10-CM

## 2020-07-24 DIAGNOSIS — R42 DIZZINESS: ICD-10-CM

## 2020-07-24 DIAGNOSIS — H04.123 DRY EYES: ICD-10-CM

## 2020-07-24 DIAGNOSIS — Z78.9 HEALTH MAINTENANCE ALTERATION: ICD-10-CM

## 2020-07-24 DIAGNOSIS — R53.83 FATIGUE, UNSPECIFIED TYPE: ICD-10-CM

## 2020-07-24 DIAGNOSIS — G89.29 CHRONIC BILATERAL LOW BACK PAIN WITH RIGHT-SIDED SCIATICA: ICD-10-CM

## 2020-07-24 DIAGNOSIS — H81.12 BENIGN PAROXYSMAL POSITIONAL VERTIGO OF LEFT EAR: Primary | ICD-10-CM

## 2020-07-24 LAB
BILIRUBIN UR: NEGATIVE MG/DL
BLOOD UR: ABNORMAL MG/DL
BUN SERPL-MCNC: 18.4 MG/DL (ref 7–19)
CALCIUM SERPL-MCNC: 10.3 MG/DL (ref 8.5–10.1)
CHLORIDE SERPLBLD-SCNC: 101 MMOL/L (ref 98–110)
CO2 SERPL-SCNC: 23.8 MMOL/L (ref 20–32)
CREAT SERPL-MCNC: 0.6 MG/DL (ref 0.5–1)
GFR SERPL CREATININE-BSD FRML MDRD: >90 ML/MIN/1.7 M2
GLUCOSE SERPL-MCNC: 107.5 MG'DL (ref 70–99)
GLUCOSE URINE: NEGATIVE
HCT VFR BLD AUTO: 40.3 % (ref 35–47)
HEMOGLOBIN: 12.6 G/DL (ref 11.7–15.7)
KETONES UR QL: NEGATIVE MG/DL
LEUKOCYTE ESTERASE UR: NEGATIVE
MCH RBC QN AUTO: 30.1 PG (ref 26.5–35)
MCHC RBC AUTO-ENTMCNC: 31.3 G/DL (ref 32–36)
MCV RBC AUTO: 96.3 FL (ref 78–100)
NITRITE UR QL STRIP: NEGATIVE MG/DL
PH UR STRIP: 5.5 [PH] (ref 4.5–8)
PLATELET # BLD AUTO: 212 K/UL (ref 150–450)
POTASSIUM SERPL-SCNC: 4 MMOL/L (ref 3.3–4.5)
PROTEIN UR: NEGATIVE MG/DL
RBC # BLD AUTO: 4.18 M/UL (ref 3.8–5.2)
SODIUM SERPL-SCNC: 134.9 MMOL/L (ref 132.6–141.4)
SP GR UR STRIP: 1.02 (ref 1–1.03)
UROBILINOGEN UR STRIP-ACNC: ABNORMAL E.U./DL
WBC # BLD AUTO: 4.9 K/UL (ref 4–11)

## 2020-07-24 RX ORDER — CYCLOSPORINE 0.5 MG/ML
1 EMULSION OPHTHALMIC EVERY 4 HOURS PRN
Qty: 5.5 ML | Refills: 3 | Status: SHIPPED | OUTPATIENT
Start: 2020-07-24 | End: 2023-03-17

## 2020-07-24 RX ORDER — ACETAMINOPHEN 500 MG
1000 TABLET ORAL EVERY 8 HOURS PRN
Qty: 100 TABLET | Refills: 11 | Status: SHIPPED | OUTPATIENT
Start: 2020-07-24 | End: 2021-07-12

## 2020-07-24 RX ORDER — LATANOPROST 50 UG/ML
SOLUTION/ DROPS OPHTHALMIC
Qty: 2.5 ML | Refills: 4 | Status: SHIPPED | OUTPATIENT
Start: 2020-07-24 | End: 2023-03-17

## 2020-07-24 RX ORDER — MULTIPLE VITAMINS W/ MINERALS TAB 9MG-400MCG
1 TAB ORAL DAILY
Qty: 100 TABLET | Refills: 3 | Status: SHIPPED | OUTPATIENT
Start: 2020-07-24 | End: 2021-01-18

## 2020-07-24 RX ORDER — OMEGA-3 FATTY ACIDS/FISH OIL 300-1000MG
1 CAPSULE ORAL DAILY
Qty: 90 CAPSULE | Refills: 3 | Status: SHIPPED | OUTPATIENT
Start: 2020-07-24 | End: 2023-01-26

## 2020-07-24 RX ORDER — CHOLECALCIFEROL (VITAMIN D3) 50 MCG
50 TABLET ORAL DAILY
Qty: 100 TABLET | Refills: 3 | Status: SHIPPED | OUTPATIENT
Start: 2020-07-24 | End: 2022-04-19

## 2020-07-24 RX ORDER — NORTRIPTYLINE HYDROCHLORIDE 50 MG/1
50 CAPSULE ORAL AT BEDTIME
Qty: 30 CAPSULE | Refills: 0 | Status: SHIPPED | OUTPATIENT
Start: 2020-07-24 | End: 2020-10-13

## 2020-07-24 RX ORDER — MENTHOL 40 MG/ML
GEL TOPICAL
Qty: 1 TUBE | Refills: 11 | Status: SHIPPED | OUTPATIENT
Start: 2020-07-24 | End: 2020-12-23

## 2020-07-24 ASSESSMENT — MIFFLIN-ST. JEOR: SCORE: 1292.1

## 2020-07-24 NOTE — PROGRESS NOTES
Preceptor Attestation:   Patient seen, evaluated and discussed with the resident. I have verified the content of the note, which accurately reflects my assessment of the patient and the plan of care.   Supervising Physician:  Walter Paul MD

## 2020-07-24 NOTE — NURSING NOTE
Due to patient being non-English speaking/uses sign language, an  was used for this visit. Only for face-to-face interpretation by an external agency, date and length of interpretation can be found on the scanned worksheet.     name: Siomara Starkey  Agency: [employer]  Language: Nicaraguan   Telephone number: [phone]  Type of interpretation: Telephone, spoken      Deven Mora MA

## 2020-07-24 NOTE — LETTER
July 26, 2020      Cindy Espinoza  [address] [place] [address]   [facility] [address]        Dear Cindy,    Thank you for getting your care at [facility]. Please see below for your test results.    Resulted Orders   CBC with Plt ([facility])   Result Value Ref Range    WBC 4.9 4.0 - 11.0 K/uL    RBC 4.18 3.80 - 5.20 M/uL    Hemoglobin 12.6 11.7 - 15.7 g/dL    Hematocrit 40.3 35.0 - 47.0 %    MCV 96.3 78.0 - 100.0 fL    MCH 30.1 26.5 - 35.0 pg    MCHC 31.3 (L) 32.0 - 36.0 g/dL    Platelets 212.0 150.0 - 450.0 K/uL   Basic Metabolic Panel ([facility])   Result Value Ref Range    Calcium 10.3 (H) 8.5 - 10.1 mg/dL    Chloride 101.0 98.0 - 110.0 mmol/L    Carbon Dioxide 23.8 20.0 - 32.0 mmol/L    Creatinine 0.6 0.5 - 1.0 mg/dL    Glucose 107.5 (H) 70.0 - 99.0 mg'dL    Potassium 4.0 3.3 - 4.5 mmol/L    Sodium 134.9 132.6 - 141.4 mmol/L    GFR Estimate >90 >60.0 mL/min/1.7 m2    GFR Estimate If Black >90 >60.0 mL/min/1.7 m2    Urea Nitrogen 18.4 7.0 - 19.0 mg/dL   Urinalysis, Micro If (UA) ([facility])   Result Value Ref Range    Specific Gravity Urine 1.025 1.005 - 1.030    pH Urine 5.5 4.5 - 8.0    Leukocyte Esterase UR Negative NEGATIVE    Nitrite Urine Negative NEGATIVE mg/dL    Protein UR Negative NEGATIVE mg/dL    Glucose Urine Negative NEGATIVE    Ketones Urine Negative NEGATIVE mg/dL    Urobilinogen mg/dL 0.2 E.U./dL 0.2 E.U./dL E.U./dL    Bilirubin UR Negative NEGATIVE mg/dL    Blood UR Trace-intact (A) NEGATIVE mg/dL    Narrative    QNS for micro       Your results are reassuring.If you have questions please contact the clinic to make an appointment with  me or your primary doctor to discuss the results. British translation:tapan martinez xaqiijinay jawaabtaadii hadmaryan mclaughlin qabto noe posadasqtaliborio murphyo bran qaadebayoo renita jones jawaabtaada.      Sincerely,    Misael Simon DO

## 2020-07-24 NOTE — PROGRESS NOTES
"       HPI       iCndy Espinoza is a 60 year old  who presents for   Chief Complaint   Patient presents with     Dizziness     Has had dizziness for a while but since tuesday pt has felt that the symptoms have worsen and has felt dizziness in bed     Feeling bad, trying not to get up   Has been happening for the last 3 days  The room is spinning when she first wakes before she even tries to move  No spinning right now in the exam room  Holding the walls, someone has to help her walk  Never happened before  Denies HA, chest pain, dyspnea  No recent illness  No stuffiness, ear pain, hearing loss  No trauma  Eating and drinking normally, feels well hydrated  No recent med changes    A [employer]  was used for  this visit.    +++++++  Daughter was present during this exam    Problem, Medication and Allergy Lists were reviewed and updated if needed..    Patient is an established patient of this clinic..         Review of Systems:   Review of Systems         Physical Exam:     Vitals:    07/24/20 0851   BP: 120/83   Pulse: 71   Resp: 16   Temp: 97.3  F (36.3  C)   TempSrc: Oral   SpO2: 96%   Weight: 75.3 kg (166 lb)   Height: 1.6 m (5' 3\")     Body mass index is 29.41 kg/m .  Vitals were reviewed and were normal     Physical Exam  Constitutional:       General: She is not in acute distress.     Appearance: Normal appearance.   Cardiovascular:      Rate and Rhythm: Normal rate and regular rhythm.      Heart sounds: No murmur.   Pulmonary:      Effort: Pulmonary effort is normal. No respiratory distress.      Breath sounds: Normal breath sounds.   Musculoskeletal:      Right lower leg: No edema.      Left lower leg: No edema.   Neurological:      General: No focal deficit present.      Mental Status: She is alert and oriented to person, place, and time.      Comments: Epley positive: R-beating nystagmus   Psychiatric:         Mood and Affect: Mood normal.         Behavior: Behavior normal.           Results:      " Results from this visit  Results for orders placed or performed in visit on 07/24/20   CBC with Plt ([facility])     Status: Abnormal   Result Value Ref Range    WBC 4.9 4.0 - 11.0 K/uL    RBC 4.18 3.80 - 5.20 M/uL    Hemoglobin 12.6 11.7 - 15.7 g/dL    Hematocrit 40.3 35.0 - 47.0 %    MCV 96.3 78.0 - 100.0 fL    MCH 30.1 26.5 - 35.0 pg    MCHC 31.3 (L) 32.0 - 36.0 g/dL    Platelets 212.0 150.0 - 450.0 K/uL   Basic Metabolic Panel ([facility])     Status: Abnormal   Result Value Ref Range    Calcium 10.3 (H) 8.5 - 10.1 mg/dL    Chloride 101.0 98.0 - 110.0 mmol/L    Carbon Dioxide 23.8 20.0 - 32.0 mmol/L    Creatinine 0.6 0.5 - 1.0 mg/dL    Glucose 107.5 (H) 70.0 - 99.0 mg'dL    Potassium 4.0 3.3 - 4.5 mmol/L    Sodium 134.9 132.6 - 141.4 mmol/L    GFR Estimate >90 >60.0 mL/min/1.7 m2    GFR Estimate If Black >90 >60.0 mL/min/1.7 m2    Urea Nitrogen 18.4 7.0 - 19.0 mg/dL   Urinalysis, Micro If (UA) ([facility])     Status: Abnormal   Result Value Ref Range    Specific Gravity Urine 1.025 1.005 - 1.030    pH Urine 5.5 4.5 - 8.0    Leukocyte Esterase UR Negative NEGATIVE    Nitrite Urine Negative NEGATIVE mg/dL    Protein UR Negative NEGATIVE mg/dL    Glucose Urine Negative NEGATIVE    Ketones Urine Negative NEGATIVE mg/dL    Urobilinogen mg/dL 0.2 E.U./dL 0.2 E.U./dL E.U./dL    Bilirubin UR Negative NEGATIVE mg/dL    Blood UR Trace-intact (A) NEGATIVE mg/dL    Narrative    QNS for micro       Assessment and Plan        Cindy was seen today for dizziness.    Diagnoses and all orders for this visit:    Benign paroxysmal positional vertigo of left ear  positive testing today for BPPV.  Coached patient through [place]-Hallpike maneuver with good resolution in symptoms.  Walk patient and her daughter through again to teach them how to do this.  They agreed this was sufficient and did not require a referral to physical therapy at this time.  Educated patient this generally does a lot better than meclizine anyway for this type of  dizziness.  Did also get some labs to rule out electrolyte dysfunctions and anemia as the cause of her dizziness, but these are normal.  Vitals are negative for hypotension or bradycardia.  Also grabbed a UA that looks quite bland.      Patient needed all of her medications refilled, which was done today    Back pain, unspecified back location, unspecified back pain laterality, unspecified chronicity  -     Urinalysis, Micro If (UA) ([facility])  Dizziness  -     CBC with Plt ([facility])  -     Basic Metabolic Panel ([facility])  Vitamin D deficiency  -     vitamin D3 (CHOLECALCIFEROL) 50 mcg (2000 units) tablet; Take 1 tablet (50 mcg) by mouth daily    Chronic bilateral low back pain with right-sided sciatica  -     vitamin B-Complex; Take 1 tablet by mouth daily  -     Menthol, Topical Analgesic, (BIOFREEZE ROLL-ON) 4 % GEL; Apply to painful area of back three times daily as needed  -     acetaminophen (TYLENOL) 500 MG tablet; Take 2 tablets (1,000 mg) by mouth every 8 hours as needed for pain    Dry eyes  -     RESTASIS 0.05 % ophthalmic emulsion; Place 1 drop into both eyes every 4 hours as needed for dry eyes  -     latanoprost (XALATAN) 0.005 % ophthalmic solution; 1 DROP IN BOTH EYES NIGHTLY    Health maintenance alteration  -     omega 3 1000 MG CAPS; Take 1 g by mouth daily    Fibromyalgia  -     nortriptyline (PAMELOR) 50 MG capsule; Take 1 capsule (50 mg) by mouth At Bedtime  -     Menthol, Topical Analgesic, (BIOFREEZE) 4 % GEL; Externally apply topically 3 times daily as needed (pain)    Fatigue, unspecified type  -     multivitamin w/minerals (MULTI-VITAMIN) tablet; Take 1 tablet by mouth daily           There are no discontinued medications.    Options for treatment and follow-up care were reviewed with the patient. Cindy Espinoza  engaged in the decision making process and verbalized understanding of the options discussed and agreed with the final plan.    Misael Simon DO  [facility]  Resident PGY-3  [phone]

## 2020-07-24 NOTE — PATIENT INSTRUCTIONS
Here is the plan from today's visit    1. Benign paroxysmal positional vertigo of left ear  The ear balance crystal is out of place  When you feel dizzy, do the maneuver I taught you in clinic    Here is the link to the video:    [email or web address]    2. Back pain, unspecified back location, unspecified back pain laterality, unspecified chronicity    - Urinalysis, Micro If (UA) ([facility])    3. Dizziness    - CBC with Plt ([facility])  - Basic Metabolic Panel ([facility])      Please call or return to clinic if your symptoms don't go away.    Follow up plan  As needed    Thank you for coming to [facility] today.  Lab Testing:  **If you had lab testing today and your results are reassuring or normal they will be mailed to you or sent through [employer] within 7 days.   **If the lab tests need quick action we will call you with the results.  The phone number we will call with results is # [phone] (home) . If this is not the best number please call our clinic and change the number.  Medication Refills:  If you need any refills please call your pharmacy and they will contact us.   If you need to  your refill at a new pharmacy, please contact the new pharmacy directly. The new pharmacy will help you get your medications transferred faster.   Scheduling:  If you have any concerns about today's visit or wish to schedule another appointment please call our office during normal business hours [phone] (8-5:00 M-F)  If a referral was made to a [facility] and you don't get a call from central scheduling please call [phone].  If a Mammogram was ordered for you at [facility] call [phone] to schedule or change your appointment.  If you had an XRay/CT/Ultrasound/MRI ordered the number is [phone] to schedule or change your radiology appointment.   Medical  Concerns:  If you have urgent medical concerns please call [phone] at any time of the day.    Misael Simon, DO

## 2020-08-06 ENCOUNTER — NURSE TRIAGE (OUTPATIENT)
Dept: NURSING | Facility: CLINIC | Age: 60
End: 2020-08-06

## 2020-08-06 ENCOUNTER — OFFICE VISIT (OUTPATIENT)
Dept: FAMILY MEDICINE | Facility: CLINIC | Age: 60
End: 2020-08-06
Payer: COMMERCIAL

## 2020-08-06 VITALS
WEIGHT: 165.3 LBS | TEMPERATURE: 98.7 F | DIASTOLIC BLOOD PRESSURE: 82 MMHG | BODY MASS INDEX: 29.29 KG/M2 | SYSTOLIC BLOOD PRESSURE: 121 MMHG | RESPIRATION RATE: 15 BRPM | HEART RATE: 72 BPM | HEIGHT: 63 IN

## 2020-08-06 DIAGNOSIS — E21.3 HYPERPARATHYROIDISM (H): ICD-10-CM

## 2020-08-06 DIAGNOSIS — M54.2 NECK PAIN: Primary | ICD-10-CM

## 2020-08-06 PROCEDURE — 80053 COMPREHEN METABOLIC PANEL: CPT | Performed by: PHYSICIAN ASSISTANT

## 2020-08-06 PROCEDURE — 83970 ASSAY OF PARATHORMONE: CPT | Performed by: PHYSICIAN ASSISTANT

## 2020-08-06 PROCEDURE — 99213 OFFICE O/P EST LOW 20 MIN: CPT | Performed by: PHYSICIAN ASSISTANT

## 2020-08-06 PROCEDURE — 36415 COLL VENOUS BLD VENIPUNCTURE: CPT | Performed by: PHYSICIAN ASSISTANT

## 2020-08-06 PROCEDURE — 82306 VITAMIN D 25 HYDROXY: CPT | Performed by: PHYSICIAN ASSISTANT

## 2020-08-06 ASSESSMENT — MIFFLIN-ST. JEOR: SCORE: 1288.93

## 2020-08-06 NOTE — TELEPHONE ENCOUNTER
"Nellie Calloway calling requesting to schedule appointment today. Reporting new onset of back pain starting 8/5/20. Pain is localized to upper back and collar bone area. Reporting pain resolves at rest, occurring only when \"bending over and laying down.\" Patient denies pain during triage while sitting at rest.  Denies injury.   Denies other symptoms.  Afebrile. COVID 19 screening is negative.   Advised to be seen today in clinic.   Transferred to Central Scheduling.    COVID 19 Nurse Triage Plan/Patient Instructions    Please be aware that novel coronavirus (COVID-19) may be circulating in the community. If you develop symptoms such as fever, cough, or SOB or if you have concerns about the presence of another infection including coronavirus (COVID-19), please contact your health care provider or visit [email or web address].     Disposition/Instructions    In-Person Visit with provider recommended. Reference Visit Selection Guide.    Thank you for taking steps to prevent the spread of this virus.  o Limit your contact with others.  o Wear a simple mask to cover your cough.  o Wash your hands well and often.    Resources    [facility]: About COVID-19: [email or web address]    CDC: What to Do If You're Sick: www.cdc.gov/coronavirus/2019-ncov/about/steps-when-sick.html    CDC: Ending Home Isolation: www.cdc.gov/coronavirus/2019-ncov/hcp/disposition-in-home-patients.html     CDC: Caring for Someone: www.cdc.gov/coronavirus/2019-ncov/if-you-are-sick/care-for-someone.html     [facility]: Interim Guidance for Hospital Discharge to Home: www.health.[facility].mn.us/diseases/coronavirus/hcp/hospdischarge.pdf    [facility] clinical trials (COVID-19 research studies): clinicalaffairs.[facility].edu/um-clinical-trials     Below are the COVID-19 hotlines at the [place] Department of Health ([facility]). Interpreters are available.   o For health questions: Call 715-294-2856 or 1-746.833.7558 (7 a.m. to 7 p.m.)  o For questions about " schools and childcare: Call 602-421-9450 or 1-427.473.6209 (7 a.m. to 7 p.m.)                       Additional Information    Negative: Passed out (i.e., fainted, collapsed and was not responding)    Negative: Shock suspected (e.g., cold/pale/clammy skin, too weak to stand, low BP, rapid pulse)    Negative: Sounds like a life-threatening emergency to the triager    Negative: SEVERE back pain of sudden onset and age > 60    Negative: SEVERE abdominal pain (e.g., excruciating)    Negative: Abdominal pain and age > 60    Negative: Unable to urinate (or only a few drops) and bladder feels very full    Negative: Loss of bladder or bowel control (urine or bowel incontinence; wetting self, leaking stool) of new onset    Negative: Numbness (loss of sensation) in groin or rectal area    Negative: Pain radiates into groin, scrotum    Negative: Blood in urine (red, pink, or tea-colored)    Negative: Vomiting and pain over lower ribs of back (i.e., flank - kidney area)    Negative: Weakness of a leg or foot (e.g., unable to bear weight, dragging foot)    Negative: Patient sounds very sick or weak to the triager    Negative: Fever > 100.5 F (38.1 C) and flank pain    Negative: Pain or burning with passing urine (urination)    Negative: SEVERE back pain (e.g., excruciating, unable to do any normal activities) and not improved after pain medicine and CARE ADVICE    Negative: Numbness in an arm or hand (i.e., loss of sensation) and upper back pain    Negative: Numbness in a leg or foot (i.e., loss of sensation)    Negative: High-risk adult (e.g., history of cancer, history of HIV, or history of IV drug abuse)    Negative: Painful rash with multiple small blisters grouped together (i.e., dermatomal distribution or 'band' or 'stripe')    Negative: Pain radiates into the thigh or further down the leg, and in both legs    Age > 50 and no history of prior similar back pain    Protocols used: BACK PAIN-A-[place]

## 2020-08-06 NOTE — Clinical Note
Dr. Simon,  I saw Cindy for acute c/o neck pain that is positional.  I cannot say for sure I can fully explain.  I did update her labs to see if her calcium could be playing a role.  She may be following up with you if symptoms persist or worsen.  Thanks so much.    Jaguar Edmondson PA-C

## 2020-08-06 NOTE — PROGRESS NOTES
"Subjective     Cindy Espinoza is a 60 year old female who presents to clinic today for the following health issues:    HPI       Patient c/o shoulder and back pain with sore, swollen glands of her face x2 days.    New to me female here with daughter who does interpret for her.  Over the last couple of days, she has this odd neck discomfort that spreads to her trapezius and into her upper chest.  The interested aspect is that if she sits or stands normally, she feels just fine.  If she leans over or lies down, that is when she gets the symptoms.  Denies any other symptoms.  No fevers, chills, fatigue.  Denies any teeth or gum c/o.  Denies chest pains, SOB, palpitations.    She does have hx of some chronic pain issues, although she says this is different.  She has known hyperparathyroid and did recently have elevated calcium.  No current treatment for that.        BP Readings from Last 3 Encounters:   08/06/20 121/82   07/24/20 120/83   11/18/19 118/79    Wt Readings from Last 3 Encounters:   08/06/20 75 kg (165 lb 4.8 oz)   07/24/20 75.3 kg (166 lb)   11/18/19 75.3 kg (166 lb)                    Reviewed and updated as needed this visit by Provider         Review of Systems   Constitutional, HEENT, cardiovascular, pulmonary, gi and gu systems are negative, except as otherwise noted.      Objective    /82   Pulse 72   Temp 98.7  F (37.1  C) (Tympanic)   Resp 15   Ht 1.6 m (5' 3\")   Wt 75 kg (165 lb 4.8 oz)   LMP  (LMP Unknown)   BMI 29.28 kg/m    Body mass index is 29.28 kg/m .  Physical Exam   GENERAL: alert and no distress  EYES: Eyes grossly normal to inspection  HENT: ear canals and TM's normal, nose and mouth without ulcers or lesions  NECK: no adenopathy, no asymmetry, masses, or scars and thyroid normal to palpation  RESP: lungs clear to auscultation - no rales, rhonchi or wheezes  CV: regular rate and rhythm, normal S1 S2, no S3 or S4, no murmur, click or rub, no peripheral edema and peripheral " "pulses strong  MS: no gross musculoskeletal defects noted, no edema    Diagnostic Test Results:  Labs reviewed in Epic        Assessment & Plan     1. Neck pain  I do not appreciate any abnormality on exam.  I do wonder if they elevated PTH could be given her any of her pain symptoms.  Will recheck those levels, and have her follow up with PCP if symptoms persist or worsen   - Comprehensive metabolic panel  - Parathyroid Hormone Intact  - Vitamin D Deficiency    2. Hyperparathyroidism (H)    - Comprehensive metabolic panel  - Parathyroid Hormone Intact  - Vitamin D Deficiency     BMI:   Estimated body mass index is 29.28 kg/m  as calculated from the following:    Height as of this encounter: 1.6 m (5' 3\").    Weight as of this encounter: 75 kg (165 lb 4.8 oz).               No follow-ups on file.    Flynn Edmondson PA-C  [facility]  "

## 2020-08-07 LAB
ALBUMIN SERPL-MCNC: 3.5 G/DL (ref 3.4–5)
ALP SERPL-CCNC: 93 U/L (ref 40–150)
ALT SERPL W P-5'-P-CCNC: 34 U/L (ref 0–50)
ANION GAP SERPL CALCULATED.3IONS-SCNC: 5 MMOL/L (ref 3–14)
AST SERPL W P-5'-P-CCNC: 29 U/L (ref 0–45)
BILIRUB SERPL-MCNC: 0.2 MG/DL (ref 0.2–1.3)
BUN SERPL-MCNC: 10 MG/DL (ref 7–30)
CALCIUM SERPL-MCNC: 10.1 MG/DL (ref 8.5–10.1)
CHLORIDE SERPL-SCNC: 106 MMOL/L (ref 94–109)
CO2 SERPL-SCNC: 26 MMOL/L (ref 20–32)
CREAT SERPL-MCNC: 0.54 MG/DL (ref 0.52–1.04)
GFR SERPL CREATININE-BSD FRML MDRD: >90 ML/MIN/{1.73_M2}
GLUCOSE SERPL-MCNC: 94 MG/DL (ref 70–99)
POTASSIUM SERPL-SCNC: 3.9 MMOL/L (ref 3.4–5.3)
PROT SERPL-MCNC: 8.3 G/DL (ref 6.8–8.8)
PTH-INTACT SERPL-MCNC: 88 PG/ML (ref 18–80)
SODIUM SERPL-SCNC: 137 MMOL/L (ref 133–144)

## 2020-08-10 LAB — DEPRECATED CALCIDIOL+CALCIFEROL SERPL-MC: 33 UG/L (ref 20–75)

## 2020-10-12 DIAGNOSIS — M79.7 FIBROMYALGIA: ICD-10-CM

## 2020-10-12 NOTE — TELEPHONE ENCOUNTER
"Request for medication refill: Nortriptyline  50mg    Providers if patient needs an appointment and you are willing to give a one month supply please refill for one month and  send a letter/MyChart using \".SMILLIMITEDREFILL\" .smillimited and route chart to \"P SMI \" (Giving one month refill in non controlled medications is strongly recommended before denial)    If refill has been denied, meaning absolutely no refills without visit, please complete the smart phrase \".smirxrefuse\" and route it to the \"P SMI MED REFILLS\"  pool to inform the patient and the pharmacy.    Galian Parker, [facility]        "

## 2020-10-13 RX ORDER — NORTRIPTYLINE HYDROCHLORIDE 50 MG/1
50 CAPSULE ORAL AT BEDTIME
Qty: 90 CAPSULE | Refills: 1 | Status: SHIPPED | OUTPATIENT
Start: 2020-10-13 | End: 2021-01-15

## 2020-12-23 ENCOUNTER — VIRTUAL VISIT (OUTPATIENT)
Dept: FAMILY MEDICINE | Facility: CLINIC | Age: 60
End: 2020-12-23
Payer: COMMERCIAL

## 2020-12-23 DIAGNOSIS — E21.3 HYPERPARATHYROIDISM (H): Primary | ICD-10-CM

## 2020-12-23 DIAGNOSIS — R10.11 ABDOMINAL PAIN, RIGHT UPPER QUADRANT: ICD-10-CM

## 2020-12-23 DIAGNOSIS — M81.0 AGE-RELATED OSTEOPOROSIS WITHOUT CURRENT PATHOLOGICAL FRACTURE: ICD-10-CM

## 2020-12-23 PROCEDURE — 99214 OFFICE O/P EST MOD 30 MIN: CPT | Mod: 95 | Performed by: FAMILY MEDICINE

## 2020-12-23 RX ORDER — SENNOSIDES AND DOCUSATE SODIUM 8.6; 5 MG/1; MG/1
1-2 TABLET ORAL 2 TIMES DAILY PRN
COMMUNITY
Start: 2020-05-01 | End: 2021-01-15

## 2020-12-23 RX ORDER — CAPSAICIN 0.025 %
1 CREAM (GRAM) TOPICAL 2 TIMES DAILY PRN
COMMUNITY
Start: 2020-09-17 | End: 2021-12-02

## 2020-12-23 RX ORDER — CYCLOBENZAPRINE HCL 5 MG
1 TABLET ORAL
COMMUNITY
Start: 2020-02-24 | End: 2023-12-11

## 2020-12-23 RX ORDER — SODIUM CHLORIDE 50 MG/G
1 OINTMENT OPHTHALMIC AT BEDTIME
COMMUNITY
Start: 2020-09-12 | End: 2023-03-17

## 2020-12-23 RX ORDER — BIMATOPROST 0.1 MG/ML
1 SOLUTION/ DROPS OPHTHALMIC AT BEDTIME
COMMUNITY
Start: 2020-12-21 | End: 2023-03-17

## 2020-12-23 RX ORDER — MELOXICAM 15 MG/1
15 TABLET ORAL DAILY
COMMUNITY
Start: 2020-10-29 | End: 2021-05-03

## 2020-12-23 RX ORDER — TRAVOPROST 0.04 MG/ML
1 SOLUTION/ DROPS OPHTHALMIC DAILY
COMMUNITY
Start: 2020-06-18 | End: 2023-03-17

## 2020-12-23 NOTE — PATIENT INSTRUCTIONS
Preventative  1. Schedule an appointment with the pharmacist and bring all your medications with you.  2. Sent message to nurses regarding Osbaldo's compression stockings.    Right-Sided Abdominal Pain  1. Ordered urine and blood labs today - schedule a lab only visit. Results via telephone/MyChart  - Will also check calcium.  2. Will consider future ultrasound.

## 2020-12-23 NOTE — Clinical Note
Pt needs pharmacy visit as well as a lab visit in next 2 weeks. Rwandan speaking. Please call.   c

## 2020-12-23 NOTE — PROGRESS NOTES
"Family Medicine Telephone Visit Note       Telephone Visit Consent   Patient was verbally read the following and verbal consent was obtained.    \"Telephone visits are billed at different rates depending on your insurance coverage. During this emergency period, for some insurers they may be billed the same as an in-person visit.  Please reach out to your insurance provider with any questions.  If during the course of the call the physician/provider feels a telephone visit is not appropriate, you will not be charged for this service.\"    Name person giving consent:  Patient   Date verbal consent given:  12/23/2020  Time verbal consent given:  3:46 PM      No chief complaint on file.    Due to patient being non-English speaking/uses sign language, an  was used for this visit. Only for face-to-face interpretation by an external agency, date and length of interpretation can be found on the scanned worksheet.     name: [facility]  Agency: [employer]  Language: [place]   Telephone number: [phone]  Type of interpretation: Telephone, spoken         HPI   Patients name: Cindy  Appointment start time:  3:49 PM    Abd Pain  Has right-sided mid-abd intermittent pain. Pain is below the ribs. Has had this pain for awhile and it's become bothersome. Pt is not sure how to describe quality of pain. Pain is exacerbated by wearing pants/skirts due to pressure. Pain is also exacerbated by bending during prayer, is throbbing but doesn't last long. Occasional constipation, stools are hard and in small pieces; but constipation improves when she increases vegetable intake.Stools in the last week have been soft, not painful to pass. Had similar right-sided pain when she had gallstones but this pain is in one area and doesn't move around like it did when she had gallstones. Sxs are not exacerbated by diet. Has never had any bone fractures. Wonders if the pain is r/t her liver. Denies changes to bowel habits, trouble voiding, " dysuria, melena and hematochezia.     Medication Reconciliation  Tylenol for pain. Uses eye drops and ointment for glaucoma. Uses Refresh eye drops. Uses Lumigan at night. Uses capsaicin cream, but doesn't use the mint cream since it's not covered by insurance. Uses cyclobenzaprine with improvement. Is not taking Vitamin B and D. Not taking calcium after she was told she has high levels of calcium; was seen by specialist in 2018. Last calcium lab at [facility] was in the summer 2020. Eye doctor prescribed new medication, possibly a pill, and pt is wondering if it's meloxicam. Occasionally takes fish oil pills. Is not sure if she's taking nortriptyline.    Has not received Osbaldo's compression stockings.    Current Outpatient Medications   Medication Sig Dispense Refill     acetaminophen (TYLENOL) 500 MG tablet Take 2 tablets (1,000 mg) by mouth every 8 hours as needed for pain 100 tablet 11     capsaicin (ZOSTRIX) 0.025 % external cream Apply 1 Application topically 2 times daily as needed       cyclobenzaprine (FLEXERIL) 5 MG tablet Take 1 tablet by mouth nightly as needed       latanoprost (XALATAN) 0.005 % ophthalmic solution 1 DROP IN BOTH EYES NIGHTLY 2.5 mL 4     LUMIGAN 0.01 % SOLN ophthalmic solution Apply 1 drop to eye At Bedtime       CHERI 128 5 % ophthalmic ointment Apply 1 Application to eye At Bedtime       omega 3 1000 MG CAPS Take 1 g by mouth daily 90 capsule 3     RESTASIS 0.05 % ophthalmic emulsion Place 1 drop into both eyes every 4 hours as needed for dry eyes 5.5 mL 3     TRAVATAN Z 0.004 % ophthalmic solution Apply 1 drop to eye daily       vitamin D3 (CHOLECALCIFEROL) 50 mcg (2000 units) tablet Take 1 tablet (50 mcg) by mouth daily 100 tablet 3     meloxicam (MOBIC) 15 MG tablet Take 15 mg by mouth daily       multivitamin w/minerals (MULTI-VITAMIN) tablet Take 1 tablet by mouth daily 100 tablet 3     nortriptyline (PAMELOR) 50 MG capsule Take 1 capsule (50 mg) by mouth At Bedtime 90 capsule 1      "SM SENNA-S 8.6-50 MG tablet Take 1-2 tablets by mouth 2 times daily as needed       Allergies   Allergen Reactions     Contrast Dye Itching and Rash     Iodine Rash          Review of Systems:     Positive for: right-sided abd pain, occasional constipation    Negative for: changes in bowel habits, trouble voiding, dysuria, fevers, melena, hematochezia.    This document serves as a record of the services and decisions personally performed and made by Arleth Phelps MD. It was created on his/her behalf by Marcus Isbell, a trained medical scribe. The creation of this document is based the provider's statements to the medical scribe.  Scribe Marcus Isbell 3:48 PM, December 23, 2020       Physical Exam:     Last Menstrual Period  (LMP Unknown)   Estimated body mass index is 29.28 kg/m  as calculated from the following:    Height as of 8/6/20: 1.6 m (5' 3\").    Weight as of 8/6/20: 75 kg (165 lb 4.8 oz).    Exam:  Psychiatric: vocal affect- normal    Labs ordered, pt will schedule lab only visit.        Assessment and Plan   Diagnoses and all orders for this visit:    Hyperparathyroidism (H)  -     Cancel: Parathyroid Hormone Intact  -     Cancel: Basic Metabolic Panel ([facility])  -     Parathyroid Hormone Intact; Future  -     Calcium ionized; Future  - Hx of adenoma worked up in an outside hospital. With persistenlty elevated PTH due for repeat labs. Including an ionized CA which was rec by endocrine in the past and was not ordered last time. Given that she also has dx of osteoporosis it is possible that she is one of the 35% of casees that has true secondary osteoporosis so we will also check phosphorus creatinine and vitamin D today ,but will wait for 24 hour urine Ca.     Abdominal pain, right upper quadrant  -     Comprehensive Metabolic Panel ([facility]); Future  -     CBC with Diff Plt ([facility]); Future  -     Lipase; Future  -     Urinalysis, Micro If (UA) ([facility]); Future  - DDx: biliary, pancreatic, hepatic " and kidney stones especially with elevated calciums.  - If UA is negative for blood would consider KUB for stone.    Age-related osteoporosis without current pathological fracture  -     Cancel: TSH with free T4 reflex  -     Cancel: Vitamin D Level  -     Cancel: Phosphorus  -     Phosphorus; Future  -     TSH with free T4 reflex; Future  -     Vitamin D Level; Future    Other orders  -     MED THERAPY MANAGEMENT REFERRAL  - Pharamcy visit suggested and she will schedule.    Refilled medications that would be required in the next 3 months.     After Visit Information:  Patient chose to view AVS via [employer]    Return in about 2 weeks (around 1/6/2021) for pharmacy visit & lab only visit both same day.       Appointment end time: 4:35 PM      Clinician location:  [facility]     Patient Instructions   Preventative  1. Schedule an appointment with the pharmacist and bring all your medications with you.  2. Sent message to nurses regarding Osbaldo's compression stockings.    Right-Sided Abdominal Pain  1. Ordered urine and blood labs today - schedule a lab only visit. Results via telephone.  - Will also check calcium.  2. Will consider future ultrasound.    The information in this document, created by the medical scribe for me, accurately reflects the services I personally performed and the decisions made by me. I have reviewed and approved this document for accuracy prior to leaving the patient care area.    Arleth Phelps MD  3:48 PM, 12/23/20  End time: 4:35 PM

## 2020-12-28 DIAGNOSIS — M81.0 AGE-RELATED OSTEOPOROSIS WITHOUT CURRENT PATHOLOGICAL FRACTURE: ICD-10-CM

## 2020-12-28 DIAGNOSIS — E21.3 HYPERPARATHYROIDISM (H): ICD-10-CM

## 2020-12-28 DIAGNOSIS — R10.11 ABDOMINAL PAIN, RIGHT UPPER QUADRANT: ICD-10-CM

## 2020-12-28 LAB
% GRANULOCYTES: 45.3 %G (ref 40–75)
ALBUMIN SERPL-MCNC: 4.6 MG/DL (ref 3.5–4.7)
ALP SERPL-CCNC: 84.1 U/L (ref 31.7–110.5)
ALT SERPL-CCNC: 53.9 U/L (ref 0–45)
AST SERPL-CCNC: 42.8 U/L (ref 0–45)
BILIRUB SERPL-MCNC: <0.4 MG/DL (ref 0.2–1.3)
BILIRUBIN UR: NEGATIVE MG/DL
BLOOD UR: NEGATIVE MG/DL
BUN SERPL-MCNC: 15.2 MG/DL (ref 7–19)
CA-I SERPL ISE-MCNC: 5.4 MG/DL (ref 4.4–5.2)
CALCIUM SERPL-MCNC: 10.2 MG/DL (ref 8.5–10.1)
CHLORIDE SERPLBLD-SCNC: 100.8 MMOL/L (ref 98–110)
CO2 SERPL-SCNC: 25.2 MMOL/L (ref 20–32)
CREAT SERPL-MCNC: 0.6 MG/DL (ref 0.5–1)
GFR SERPL CREATININE-BSD FRML MDRD: >90 ML/MIN/1.7 M2
GLUCOSE SERPL-MCNC: 103.8 MG'DL (ref 70–99)
GLUCOSE URINE: NEGATIVE
GRANULOCYTES #: 1.7 K/UL (ref 1.6–8.3)
HCT VFR BLD AUTO: 41.7 % (ref 35–47)
HEMOGLOBIN: 12.7 G/DL (ref 11.7–15.7)
KETONES UR QL: NEGATIVE MG/DL
LEUKOCYTE ESTERASE UR: NEGATIVE
LIPASE SERPL-CCNC: 97 U/L (ref 73–393)
LYMPHOCYTES # BLD AUTO: 1.7 K/UL (ref 0.8–5.3)
LYMPHOCYTES NFR BLD AUTO: 44.8 %L (ref 20–48)
MCH RBC QN AUTO: 29.1 PG (ref 26.5–35)
MCHC RBC AUTO-ENTMCNC: 30.5 G/DL (ref 32–36)
MCV RBC AUTO: 95.5 FL (ref 78–100)
MID #: 0.4 K/UL (ref 0–2.2)
MID %: 9.9 %M (ref 0–20)
NITRITE UR QL STRIP: NEGATIVE MG/DL
PH UR STRIP: 7 [PH] (ref 4.5–8)
PHOSPHATE SERPL-MCNC: 3 MG/DL (ref 2.5–4.5)
PLATELET # BLD AUTO: 210 K/UL (ref 150–450)
POTASSIUM SERPL-SCNC: 3.6 MMOL/L (ref 3.3–4.5)
PROT SERPL-MCNC: 7.8 G/DL (ref 6.8–8.8)
PROTEIN UR: NEGATIVE MG/DL
PTH-INTACT SERPL-MCNC: 97 PG/ML (ref 18–80)
RBC # BLD AUTO: 4.37 M/UL (ref 3.8–5.2)
SODIUM SERPL-SCNC: 140.4 MMOL/L (ref 132.6–141.4)
SP GR UR STRIP: 1.02 (ref 1–1.03)
TSH SERPL DL<=0.005 MIU/L-ACNC: 1.37 MU/L (ref 0.4–4)
UROBILINOGEN UR STRIP-ACNC: NORMAL E.U./DL
WBC # BLD AUTO: 3.7 K/UL (ref 4–11)

## 2020-12-28 PROCEDURE — 84443 ASSAY THYROID STIM HORMONE: CPT | Performed by: FAMILY MEDICINE

## 2020-12-28 PROCEDURE — 80053 COMPREHEN METABOLIC PANEL: CPT

## 2020-12-28 PROCEDURE — 82306 VITAMIN D 25 HYDROXY: CPT | Performed by: FAMILY MEDICINE

## 2020-12-28 PROCEDURE — 36415 COLL VENOUS BLD VENIPUNCTURE: CPT

## 2020-12-28 PROCEDURE — 85025 COMPLETE CBC W/AUTO DIFF WBC: CPT

## 2020-12-28 PROCEDURE — 82330 ASSAY OF CALCIUM: CPT | Performed by: FAMILY MEDICINE

## 2020-12-28 PROCEDURE — 83690 ASSAY OF LIPASE: CPT | Performed by: FAMILY MEDICINE

## 2020-12-28 PROCEDURE — 81003 URINALYSIS AUTO W/O SCOPE: CPT

## 2020-12-28 PROCEDURE — 84100 ASSAY OF PHOSPHORUS: CPT | Performed by: FAMILY MEDICINE

## 2020-12-28 PROCEDURE — 83970 ASSAY OF PARATHORMONE: CPT | Performed by: FAMILY MEDICINE

## 2020-12-29 ENCOUNTER — APPOINTMENT (OUTPATIENT)
Dept: INTERPRETER SERVICES | Facility: CLINIC | Age: 60
End: 2020-12-29
Payer: COMMERCIAL

## 2020-12-29 LAB — DEPRECATED CALCIDIOL+CALCIFEROL SERPL-MC: 33 UG/L (ref 20–75)

## 2021-01-15 ENCOUNTER — OFFICE VISIT (OUTPATIENT)
Dept: PHARMACY | Facility: CLINIC | Age: 61
End: 2021-01-15
Attending: FAMILY MEDICINE
Payer: COMMERCIAL

## 2021-01-15 VITALS
TEMPERATURE: 98 F | BODY MASS INDEX: 29.51 KG/M2 | HEART RATE: 102 BPM | SYSTOLIC BLOOD PRESSURE: 133 MMHG | DIASTOLIC BLOOD PRESSURE: 82 MMHG | OXYGEN SATURATION: 95 % | WEIGHT: 166.6 LBS | RESPIRATION RATE: 16 BRPM

## 2021-01-15 DIAGNOSIS — K59.00 CONSTIPATION, UNSPECIFIED CONSTIPATION TYPE: ICD-10-CM

## 2021-01-15 DIAGNOSIS — E55.9 VITAMIN D DEFICIENCY: Primary | ICD-10-CM

## 2021-01-15 DIAGNOSIS — H04.123 DRY EYES: ICD-10-CM

## 2021-01-15 DIAGNOSIS — K59.00 CONSTIPATION: ICD-10-CM

## 2021-01-15 DIAGNOSIS — M54.50 LOW BACK PAIN, UNSPECIFIED BACK PAIN LATERALITY, UNSPECIFIED CHRONICITY, UNSPECIFIED WHETHER SCIATICA PRESENT: ICD-10-CM

## 2021-01-15 PROCEDURE — 99607 MTMS BY PHARM ADDL 15 MIN: CPT | Performed by: PHARMACIST

## 2021-01-15 PROCEDURE — 99605 MTMS BY PHARM NP 15 MIN: CPT | Performed by: PHARMACIST

## 2021-01-15 RX ORDER — POLYETHYLENE GLYCOL 3350 17 G/17G
1 POWDER, FOR SOLUTION ORAL DAILY
Qty: 510 G | Refills: 1 | Status: SHIPPED | OUTPATIENT
Start: 2021-01-15 | End: 2023-01-26

## 2021-01-15 NOTE — PROGRESS NOTES
medication Therapy Management (MTM) Encounter    ASSESSMENT:                            Medication Adherence/Access: No issues identified    Dry eyes:   Controlled   Managed by Dr. Osmin Lee  Cindy uses several different eyedrops.  Today she presents with Restasis, Lumigan, refresh eyedrops, and an additional face wash for her eyelids.  There is 1 eyedrop that is not present today.  This appears to be a sodium chloride eye ointment, but unclear.  Patient will bring this last medication in to her next appointment on Monday.  The patient has discontinued the vitamin B and C multivitamin.  She is also discontinue the fish oil.  Each of these have been discontinued generally either convenience, including size and taste of the medication.    Back Pain : Stable  Patient has been relatively controlled using 1000 mg of acetaminophen twice daily.  She describes a desire for stronger medication and requests a refill for meloxicam.  Today I have recommended that she consider taking acetaminophen 1000 mg 3 times daily on days where her pain is increased.  May consider a refill of meloxicam at her next encounter on Monday.    Insomnia : Resolved  Patient was previously treated with a TCA for sleeping.  She did not appreciate the way it felt and feels that this issue has been resolved.  No further medication necessary.    Vit D:   Recommend drawing a vitamin D level.  Patient has elevated calcium and is currently managed     Constipation: .  Cindy's constipation is currently controlled, but often returns.  Today we discussed starting a fiber therapy to improve overall maintenance of her constipation.  When symptoms worsen, she will begin to use Miralax to address symptoms.  She has abandoned senna, but in the future this may be an effective treatment of constipation in addition to the maintenance therapy of fiber.     PLAN:                              May increase APAP to TID before Monday Apt.    Will begin to take Fiber  daily, and use Miralax if constipation is not controlled.      - polyethylene glycol (MIRALAX) 17 GM/Dose powder; Take 17 g (1 capful) by   mouth daily As needed when constipation symptoms worsen  Dispense: 510 g;   Refill: 1    - psyllium (METAMUCIL/KONSYL) 58.6 % powder; Take 6 g (1 teaspoonful) by   mouth daily  Dispense: 1 Bottle; Refill: 11         Follow-up: 3 days with Dr. Arechiga    SUBJECTIVE/OBJECTIVE:                          Cindy Espinoza is a 61 year old female coming in for an initial visit. She was referred to me from Arleth Phelps MD .  was present during today's visit.     Reason for visit: polypharmacy referral from PCP.  Patient is confused about some meds and would like to discuss further.  .    Allergies/ADRs: Reviewed in chart  Tobacco: She reports that she has never smoked. She has never used smokeless tobacco.  Alcohol: none  Caffeine: 1 cups/day of tea  Activity: minimal - just stays home   Past Medical History: Reviewed in chart  Social: immig[place] to [place] years ago  Personal Healthcare Goals: has been working to decrease salt and sugar in her diet.    Medication Adherence/Access: the pharmacy delivers to the house  Has good service from the pharmacy .  Patient takes medications directly from bottles.  Medication barriers:  eye drops need to be purchased out of pocket.  .       Pain:   Pain in her lower back and upper back pain.    APAP 500 mg tabs  Used as needed. Used each day this week.  Usually takes 2 tabs AM and 2 tabs in the PM. The PM dose helps her sleep.    Feels this has been effective and works to address her pain.      Melocixam was prescribed to her from an outside physician.  She liked this medication but ran out of refills and previous provider denied refills.  She would like a refill.  She states that the last Rx lasted her a few months.  Last dose of this medication was 3 weeks ago.        Vit D:   Currently takes 2000 international unit(s) daily. Has taken  "this medication for years.  In the past she has taken Vit D + Ca++.  She is not currently taking any calcium.   Takes the Vit D in the AM.      Vitamin D 1,25   Date Value Ref Range Status   02/06/2015 68  Final     Comment:     Reference range: 15 to 75  Unit: pg/mL  (Note)  INTERPRETIVE INFORMATION: Vitamin D, 1,25-Dihydroxy  This test is primarily indicated during patient evaluation  for hypercalcemia and renal failure. A normal result does  not rule out Vitamin D deficiency. The recommended test for  diagnosing Vitamin D deficiency is Vitamin D 25-hydroxy.  Test developed and characteristics determined by [facility]. See Compliance Statement B: [facility]  Performed by [facility],  [address] [place] [address] [phone]  [email or web address], Evgeny Contreras MD, Lab. Director       25 OH Vit D2   Date Value Ref Range Status   02/06/2015 <5 ug/L Final   01/20/2012 <5 ug/L Final   09/02/2011 <5 ug/L Final     25 OH Vit D3   Date Value Ref Range Status   02/06/2015 23 ug/L Final   01/20/2012 26 ug/L Final   09/02/2011 8. ug/L Final       MVI: pharmacy dispensed a tablet called TOTAL B/C  Has stopped taking this medication months ago.  She does not want to take extra meds.      Eye health :   Was taking fish oil for eye health.  Pt stopped taking this because the tablets were too big.  Stopped one month ago.    Current size is too big but she states she may be willing to take this if there were a better size. . Not interested in opening up the cap to intake the juice.  She adds that the smaller size may have a pork product and her pharmacist explained that was why she is \"limited\" to this option. (The patient expresses the desire to use a product without pork products.)     REFRESH EYE DROPS: used 4x per day.  Usually dose thi s1-2 times per day    Lumigan 0.01% ophthalmic solution at night used in both eyes.    RESTASIS: USED twice a day.  Consistent use.      Has an opth ointment at home that " she used it daily.         Ophthalmic Dispensing history:    Sodium Chloride (Hypertonic)   Dispensed Days Supply Quantity Provider Pharmacy   SODIUM CHLORIDE 5% EYE DROP 11/20/2020 30 15 each [facility] [place]   SODIUM CHLORIDE 5% EYE DROP 10/23/2020 30 15 each [facility] [place]   CHERI-128 5% EYE OINTMENT 09/12/2020 10 3 g [facility] [place]   CHERI-128 5% EYE OINTMENT 09/04/2020 10 3 g [facility] [place]   CHERI-128 5% EYE OINTMENT 06/09/2020 10 3 g [facility] [place]                  Travoprost   Dispensed Days Supply Quantity Provider Pharmacy   TRAVATAN Z 0.004% EYE DROP 06/18/2020 30 2 each [facility] [place]   TRAVATAN Z 0.004% EYE DROP 05/21/2020 30 2 each LEATHA [facility]SHCHAU [facility] [place]   TRAVATAN Z 0.004% EYE DROP 04/22/2020 30 2 each LEATHA [facility]LLSHCHAU [facility] [place]   TRAVATAN Z 0.004% EYE DROP 03/25/2020 30 2 each [facility] [place]   TRAVATAN Z 0.004% EYE DROP 02/26/2020 30 2 each [facility] [place]              Latanoprost   Dispensed Days Supply Quantity Provider Pharmacy   LATANOPROST 0.005% EYE DROPS 11/12/2020 30 2 each DARLENE GLEAS[facility] [place]   LATANOPROST 0.005% EYE DROPS 10/15/2020 30 2 each [facility]LEN[facility]LEAS[facility] [place]   LATANOPROST 0.005% EYE DROPS 09/17/2020 30 2 each D[facility]LENE GLEAS[facility] [place]   LATANOPROST 0.005% EYE DROPS 08/21/2020 30 2 each [facility]LENE GLEAS[facility] [place]...                Bimatoprost   Dispensed Days Supply Quantity Provider Pharmacy   LUMIGAN 0.01% EYE DROPS 12/21/2020 30 2 each [facility] [place]   LUMIGAN 0.01% EYE DROPS 11/23/2020 30 2 each [facility] [place]   LUMIGAN 0.01% EYE DROPS 10/26/2020 30 2 each [facility] [place]   LUMIGAN 0.01% EYE DROPS 09/28/2020 30 2 each [facility] CARRO[facility]  [place]   LUMIGAN 0.01% EYE DROPS 09/04/2020 30 2 each DARLINEMATI MCCLAIN [facility] [place]...   LUMIGAN 0.01% EYE DROPS 07/23/2020 30 7 each [facility]ARLINEMATI MCCLAIN [facility] [place]   LUMIGAN 0.01% EYE DROPS 06/23/2020 30 5 each DARLINEMATI MCCLAIN [facility] [place]...                        insomnia:   Was prescribed nortyptyline in the past for sleep.   Issues resolved and she no longer uses this.        Constipation :   Requests the use of a med that needs to be mixed with liquid.    Has not been using senna. This was prescribed by Dr. Phelps a while ago and it did not work.    Currently BM is fine.  No pain and able to produce stool. She would like to have this available for future cases of constipation.  This happens a few times a month where she reports     Will send fiber suppliment       Most Recent Immunizations   Administered Date(s) Administered     DTaP, Unspecified 10/20/2010     Flu, Unspecified 11/21/2014     HEPA 11/23/2010     HepB 02/14/2011     HepB, Unspecified 10/20/2010     Influenza (IIV3) PF 10/28/2011     Influenza Vaccine, 6+MO IM (QUADRIVALENT W/PRESERVATIVES) 11/21/2014     MMR 10/20/2010     Meningococcal (Menactra ) 07/13/2018     TD (ADULT, 7+) 06/20/2011     Tdap (Adacel,Boostrix) 10/20/2010     Tdap (Adult) Unspecified Formulation 06/20/2011     Varicella 11/23/2010     Current Outpatient Medications   Medication     acetaminophen (TYLENOL) 500 MG tablet     capsaicin (ZOSTRIX) 0.025 % external cream     cyclobenzaprine (FLEXERIL) 5 MG tablet     latanoprost (XALATAN) 0.005 % ophthalmic solution     LUMIGAN 0.01 % SOLN ophthalmic solution     meloxicam (MOBIC) 15 MG tablet     multivitamin w/minerals (MULTI-VITAMIN) tablet     CHERI 128 5 % ophthalmic ointment     nortriptyline (PAMELOR) 50 MG capsule     omega 3 1000 MG CAPS     RESTASIS 0.05 % ophthalmic emulsion     SM SENNA-S 8.6-50 MG tablet     TRAVATAN Z 0.004 % ophthalmic solution     vitamin D3 (CHOLECALCIFEROL) 50 mcg (2000  units) tablet     No current facility-administered medications for this visit.      Patient Active Problem List   Diagnosis     Hypercalcemia     Menopause present     Fibromyalgia     Chronic tension headaches     Osteoporosis     TMJ (temporomandibular joint syndrome)     Abnormal Pap smear of cervix     Caregiver burden     Chronic bilateral low back pain with right-sided sciatica     Hyperparathyroidism (H)     RUQ abdominal pain         Today's Vitals: /82   Pulse 102   Temp 98  F (36.7  C) (Oral)   Resp 16   Wt 166 lb 9.6 oz (75.6 kg)   LMP  (LMP Unknown)   SpO2 95%   BMI 29.51 kg/m    ----------------        I spent 50 minutes with this patient today. Dr. Arleth Phelps MD  was provided the recommendations above  via routed note and Dr. Dimas is the authorizing prescriber for this visit through the pharmacist collaborative practice agreement.. A copy of the visit note was provided to the patient's primary care provider.    The patient was given a summary of these recommendations.     Mathieu Wallace, Pharm.D.        Medication Therapy Recommendations  Constipation    Current Medication: polyethylene glycol (MIRALAX) 17 GM/Dose powder   Rationale: Untreated condition - Needs additional medication therapy - Indication   Recommendation: Start Medication - start med to address unresolved constipation   Status: Accepted per CPA          Current Medication: psyllium (METAMUCIL/KONSYL) 58.6 % powder   Rationale: Synergistic therapy - Needs additional medication therapy - Indication   Recommendation: Start Medication - start daily fiber as  a safer option to manage chronic constipation   Status: Accepted per CPA         Dry eyes    Rationale: No medical indication at this time - Unnecessary medication therapy - Indication   Recommendation: Discontinue Medication - vitamin b complex w/vitamin C Tabs tablet - patient does not believe this vitamin is helpful and I do not support the use either,  added  convenience to DC this med   Status: Accepted - no CPA Needed

## 2021-01-15 NOTE — PATIENT INSTRUCTIONS
Recommendations from today's MTM visit:                                                       1. Constipation  Begin taking   - psyllium (METAMUCIL/KONSYL) 58.6 % powder; Take 6 g (1 teaspoonful) by mouth daily      When symptoms worsen begin:  - polyethylene glycol (MIRALAX) 17 GM/Dose powder; Take 17 g (1 capful) by mouth daily As needed     It was great to speak with you today.  I value your experience and would be very thankful for your time with providing feedback on our clinic survey. You may receive a survey via email or text message in the next few days.     Next visit on 1/18/21    To schedule another MTM appointment, please call the clinic directly or you may call the MTM scheduling line at [phone] or toll-free at [phone].     My Clinical Pharmacist's contact information:                                                      It was a pleasure talking with you today!  Please feel free to contact me with any questions or concerns you have.      Mathieu Wallace, Pharm.D.  [facility]  [phone]  [address]    Monday 10-12 noon, Tues: 8-12 noon, Wed 8-5 PM, Friday 8-5 PM  Contact me via iwi

## 2021-01-15 NOTE — NURSING NOTE
Due to patient being non-English speaking/uses sign language, an  was used for this visit. Only for face-to-face interpretation by an external agency, date and length of interpretation can be found on the scanned worksheet.     name: Sarahi Olmstead  Language: Solomon Islander  Agency: [employer]  Phone number: [phone]  Type of interpretation: Telephone, spoken    Stephanie Quintanilla CMA

## 2021-01-18 ENCOUNTER — OFFICE VISIT (OUTPATIENT)
Dept: FAMILY MEDICINE | Facility: CLINIC | Age: 61
End: 2021-01-18
Payer: COMMERCIAL

## 2021-01-18 VITALS
DIASTOLIC BLOOD PRESSURE: 86 MMHG | WEIGHT: 165.4 LBS | TEMPERATURE: 98.2 F | HEIGHT: 64 IN | SYSTOLIC BLOOD PRESSURE: 135 MMHG | BODY MASS INDEX: 28.24 KG/M2 | HEART RATE: 73 BPM | OXYGEN SATURATION: 96 %

## 2021-01-18 DIAGNOSIS — E21.3 HYPERPARATHYROIDISM (H): Primary | ICD-10-CM

## 2021-01-18 DIAGNOSIS — M81.0 AGE-RELATED OSTEOPOROSIS WITHOUT CURRENT PATHOLOGICAL FRACTURE: ICD-10-CM

## 2021-01-18 DIAGNOSIS — H04.123 DRY EYES: ICD-10-CM

## 2021-01-18 PROCEDURE — 99214 OFFICE O/P EST MOD 30 MIN: CPT | Mod: GC | Performed by: STUDENT IN AN ORGANIZED HEALTH CARE EDUCATION/TRAINING PROGRAM

## 2021-01-18 RX ORDER — MELOXICAM 15 MG/1
15 TABLET ORAL DAILY
Qty: 30 TABLET | Refills: 4 | Status: SHIPPED | OUTPATIENT
Start: 2021-01-18 | End: 2021-05-03

## 2021-01-18 RX ORDER — BIMATOPROST 0.3 MG/ML
1 SOLUTION/ DROPS OPHTHALMIC AT BEDTIME
Qty: 5 ML | Refills: 3 | Status: SHIPPED | OUTPATIENT
Start: 2021-01-18 | End: 2023-03-17

## 2021-01-18 RX ORDER — CHOLECALCIFEROL (VITAMIN D3) 50 MCG
1 TABLET ORAL 2 TIMES DAILY
Qty: 60 TABLET | Refills: 3 | Status: SHIPPED | OUTPATIENT
Start: 2021-01-18 | End: 2021-07-23

## 2021-01-18 ASSESSMENT — ENCOUNTER SYMPTOMS
DIZZINESS: 0
SHORTNESS OF BREATH: 0
CONSTIPATION: 0
LIGHT-HEADEDNESS: 0
SINUS PAIN: 0
HEADACHES: 0
FEVER: 0
CHILLS: 0
DIARRHEA: 0
ARTHRALGIAS: 1
HEMATOCHEZIA: 0
ACTIVITY CHANGE: 0

## 2021-01-18 ASSESSMENT — MIFFLIN-ST. JEOR: SCORE: 1296.12

## 2021-01-18 NOTE — PROGRESS NOTES
Assessment & Plan     Hyperparathyroidism (H)  Ms. Espinoza has had hypercalcemia since 2012 and was diagnosed with primary yperparathyroidism by Endocrinology at the [facility]. She has been seen by a few providers since then and was recommended to pursue a parathyroidectomy due to osteoporosis. DEXA scan in 2012 showed lowest T-score of -3.0 in the lumbar spine. However, she did not pursue surgical intervention and is not interested in that treatment route at this time. We discussed her 12/28/2020 lab results, including the Vit D level, elevated PTH and Ca. We also discussed the findings of her last DEXA scan. She continues to not want surgical intervention for her primary hyperparathyroidism so the plan is:    Increase Vitamin D from 2000 daily to 4000 daily. The goal is to maintain level of at least 33  DEXA scan annually (ordered)  Annual Ca, Cr, GFR  Patient advised to   -Avoid factors that can aggravate hypercalcemia if possible, including thiazide diuretic , high-calcium diet (cheese, yogurt, milk).    -physical activity to minimize bone resorption.   -adequate hydration (at least six to eight glasses of water per day) to minimize the risk of nephrolithiasis.   -Maintain a moderate calcium intake (1000 mg/day)     Review of external notes as documented above       No follow-ups on file.    Dafne Arechiga DO  [facility] DARIAN White is a 61 year old who presents to clinic today for the following health issues.   She wanted to discuss the lab results from her appt with Dr. Phelps 12/28/2020.     Per chart review:  Has had hypercalcemia since as far back as 2012. PTH also checked since 2012 has been elevated. Ranging . In 2012, she had a 24 hr urine calcium that was 180 mg/24 hrs. Serum calcium then was 10.6, .    She saw Dr. Aguilar (Endocrine, [facility]) in 2012 and was diagnosed with primary hyperparathyroidism, and recommended parathyroidectomy due  "to osteoporosis. DXA scan then showed lowest T-score of -3.0 in the lumbar spine. Head and neck US then did not seem to localize any parathyroid adenoma. ptsorin was lost to f/up until 2015, when she saw Dr. Trent Bonilla (Endocrine). She was advised observation. Failed to f/up.     Dr. Ortiz recommended parathyroidectomy in 10/2018.    Saw endocrine Dr. Akilah Davila at [facility] in Feb 2018, when she was seeking second opinion after previous recommendation at the  to have parathyroiud adenoma surgically removed. He recommended Q6 months PTH, ionized calcium, and serum calcium. Did not have surgical intervention    Drinks 1 cup of milk a day  She denies any kidney stones, frequent falls with fractures    Discussed osteoporosis and pain from that. She asked for a refill of Meloxicam, which she takes when her knee pain is worse    Review of Systems   Constitutional: Negative for activity change, chills and fever.   HENT: Negative for sinus pain and tinnitus.    Eyes: Negative for visual disturbance.   Respiratory: Negative for shortness of breath.    Cardiovascular: Negative for chest pain.   Gastrointestinal: Negative for constipation, diarrhea and hematochezia.   Endocrine: Negative for cold intolerance and heat intolerance.   Musculoskeletal: Positive for arthralgias.   Neurological: Negative for dizziness, light-headedness and headaches.        Objective    /86 (BP Location: Left arm, Patient Position: Sitting, Cuff Size: Adult Regular)   Pulse 73   Temp 98.2  F (36.8  C) (Oral)   Ht 1.619 m (5' 3.74\")   Wt 75 kg (165 lb 6.4 oz)   LMP  (LMP Unknown)   SpO2 96%   BMI 28.62 kg/m    Body mass index is 28.62 kg/m .     Physical Exam  Vitals signs reviewed.   Constitutional:       Appearance: Normal appearance.   HENT:      Head: Normocephalic.   Eyes:      General: Lids are normal. Gaze aligned appropriately.      Extraocular Movements: Extraocular movements intact.      Right eye: No nystagmus.      " Left eye: No nystagmus.      Conjunctiva/sclera: Conjunctivae normal.      Pupils: Pupils are equal, round, and reactive to light.   Neck:      Musculoskeletal: Normal range of motion.   Cardiovascular:      Rate and Rhythm: Normal rate and regular rhythm.      Pulses: Normal pulses.      Heart sounds: Normal heart sounds.   Pulmonary:      Effort: Pulmonary effort is normal.      Breath sounds: Normal breath sounds. No wheezing, rhonchi or rales.   Abdominal:      General: Abdomen is flat.      Palpations: Abdomen is soft. There is no mass.      Tenderness: There is no abdominal tenderness.   Musculoskeletal: Normal range of motion.   Skin:     General: Skin is warm and dry.   Neurological:      Mental Status: She is alert and oriented to person, place, and time.      Gait: Gait is intact.   Psychiatric:         Mood and Affect: Mood normal.         Behavior: Behavior normal.        Orders Only on 12/28/2020   Component Date Value Ref Range Status     Calcium Ionized 12/28/2020 5.4* 4.4 - 5.2 mg/dL Final     Vitamin D Deficiency screening 12/28/2020 33  20 - 75 ug/L Final    Comment: Season, race, dietary intake, and treatment affect the concentration of   25-hydroxy-Vitamin D. Values may decrease during winter months and increase   during summer months. Values 20-29 ug/L may indicate Vitamin D insufficiency   and values <20 ug/L may indicate Vitamin D deficiency.  Vitamin D determination is routinely performed by an immunoassay specific for   25 hydroxyvitamin D3.  If an individual is on vitamin D2 (ergocalciferol)   supplementation, please specify 25 OH vitamin D2 and D3 level determination by   LCMSMS test VITD23.       TSH 12/28/2020 1.37  0.40 - 4.00 mU/L Final     Phosphorus 12/28/2020 3.0  2.5 - 4.5 mg/dL Final     Parathyroid Hormone Intact 12/28/2020 97* 18 - 80 pg/mL Final     Lipase 12/28/2020 97  73 - 393 U/L Final     WBC 12/28/2020 3.7* 4.0 - 11.0 K/uL Final     Lymphocytes # 12/28/2020 1.7  0.8 - 5.3  K/uL Final     % Lymphocytes 12/28/2020 44.8  20.0 - 48.0 %L Final     Mid # 12/28/2020 0.4  0.0 - 2.2 K/uL Final     Mid % 12/28/2020 9.9  0.0 - 20.0 %M Final     GRANULOCYTES # 12/28/2020 1.7  1.6 - 8.3 K/uL Final     % Granulocytes 12/28/2020 45.3  40.0 - 75.0 %G Final     RBC 12/28/2020 4.37  3.80 - 5.20 M/uL Final     Hemoglobin 12/28/2020 12.7  11.7 - 15.7 g/dL Final     Hematocrit 12/28/2020 41.7  35.0 - 47.0 % Final     MCV 12/28/2020 95.5  78.0 - 100.0 fL Final     MCH 12/28/2020 29.1  26.5 - 35.0 pg Final     MCHC 12/28/2020 30.5* 32.0 - 36.0 g/dL Final     Platelets 12/28/2020 210.0  150.0 - 450.0 K/uL Final     Calcium 12/28/2020 10.2* 8.5 - 10.1 mg/dL Final     Chloride 12/28/2020 100.8  98.0 - 110.0 mmol/L Final     Carbon Dioxide 12/28/2020 25.2  20.0 - 32.0 mmol/L Final     Creatinine 12/28/2020 0.6  0.5 - 1.0 mg/dL Final     Glucose 12/28/2020 103.8* 70.0 - 99.0 mg'dL Final     Potassium 12/28/2020 3.6  3.3 - 4.5 mmol/L Final     Sodium 12/28/2020 140.4  132.6 - 141.4 mmol/L Final     Protein Total 12/28/2020 7.8  6.8 - 8.8 g/dL Final     GFR Estimate 12/28/2020 >90  >60.0 mL/min/1.7 m2 Final     GFR Estimate If Black 12/28/2020 >90  >60.0 mL/min/1.7 m2 Final     Albumin 12/28/2020 4.6  3.5 - 4.7 mg/dL Final     Alkaline Phosphatase 12/28/2020 84.1  31.7 - 110.5 U/L Final     ALT 12/28/2020 53.9* 0.0 - 45.0 U/L Final     AST 12/28/2020 42.8  0.0 - 45.0 U/L Final     Bilirubin Total 12/28/2020 <0.4  0.2 - 1.3 mg/dL Final     Urea Nitrogen 12/28/2020 15.2  7.0 - 19.0 mg/dL Final     Specific Gravity Urine 12/28/2020 1.025  1.005 - 1.030 Final     pH Urine 12/28/2020 7.0  4.5 - 8.0 Final     Leukocyte Esterase UR 12/28/2020 Negative  NEGATIVE Final     Nitrite Urine 12/28/2020 Negative  NEGATIVE mg/dL Final     Protein UR 12/28/2020 Negative  NEGATIVE mg/dL Final     Glucose Urine 12/28/2020 Negative  NEGATIVE Final     Ketones Urine 12/28/2020 Negative  NEGATIVE mg/dL Final     Urobilinogen mg/dL  12/28/2020 0.2 E.U./dL  0.2 E.U./dL E.U./dL Final     Bilirubin UR 12/28/2020 Negative  NEGATIVE mg/dL Final     Blood UR 12/28/2020 Negative  NEGATIVE mg/dL Final

## 2021-01-18 NOTE — PROGRESS NOTES
Preceptor Attestation:    Patient seen and evaluated in person. I discussed the patient with the resident. I have verified the content of the note, which accurately reflects my assessment of the patient and the plan of care.   Supervising Physician:  Earnestine Ruelas MD.

## 2021-01-18 NOTE — PATIENT INSTRUCTIONS
Patient Education   Here is the plan from today's visit    1. Hyperparathyroidism (H)  Increase Vitamin D from 2000 daily to 4000 daily (take two pills a day)  DEXA scan annually  Annual Ca, Cr, GFR    Please call or return to clinic if your symptoms don't go away.    Follow up plan  Please make a clinic appointment for follow up with your primary physician Arleth Phelps MD in 1 month for annual visit.    Thank you for coming to [facility] today.  Lab Testing:  **If you had lab testing today and your results are reassuring or normal they will be mailed to you or sent through [employer] within 7 days.   **If the lab tests need quick action we will call you with the results.  The phone number we will call with results is # [phone] (home) . If this is not the best number please call our clinic and change the number.  Medication Refills:  If you need any refills please call your pharmacy and they will contact us.   If you need to  your refill at a new pharmacy, please contact the new pharmacy directly. The new pharmacy will help you get your medications transferred faster.   Scheduling:  If you have any concerns about today's visit or wish to schedule another appointment please call our office during normal business hours [phone] (8-5:00 M-F)  If a referral was made to a [facility] and you don't get a call from central scheduling please call [phone].  If a Mammogram was ordered for you at [facility] call [phone] to schedule or change your appointment.  If you had an XRay/CT/Ultrasound/MRI ordered the number is [phone] to schedule or change your radiology appointment.   Medical Concerns:  If you have urgent medical concerns please call [phone] at any time of the day.    Dafne Arechiga, DO

## 2021-01-18 NOTE — NURSING NOTE
Due to patient being non-English speaking/uses sign language, an  was used for this visit. Only for face-to-face interpretation by an external agency, date and length of interpretation can be found on the scanned worksheet.     name: Sarahi Olmstead  Language: Slovak  Agency: [employer]  Phone number: [phone]  Type of interpretation: telephone, spoken

## 2021-02-08 PROBLEM — E21.3 HYPERPARATHYROIDISM (H): Status: ACTIVE | Noted: 2018-04-12

## 2021-04-02 ENCOUNTER — OFFICE VISIT (OUTPATIENT)
Dept: FAMILY MEDICINE | Facility: CLINIC | Age: 61
End: 2021-04-02
Payer: COMMERCIAL

## 2021-04-02 VITALS
TEMPERATURE: 97.7 F | DIASTOLIC BLOOD PRESSURE: 79 MMHG | WEIGHT: 163 LBS | BODY MASS INDEX: 28.21 KG/M2 | SYSTOLIC BLOOD PRESSURE: 122 MMHG | RESPIRATION RATE: 16 BRPM | OXYGEN SATURATION: 100 % | HEART RATE: 91 BPM

## 2021-04-02 DIAGNOSIS — R87.612 LOW GRADE SQUAMOUS INTRAEPITHELIAL LESION ON CYTOLOGIC SMEAR OF CERVIX (LGSIL): Primary | ICD-10-CM

## 2021-04-02 DIAGNOSIS — M26.609 TMJ (TEMPOROMANDIBULAR JOINT SYNDROME): ICD-10-CM

## 2021-04-02 DIAGNOSIS — Z00.00 ROUTINE GENERAL MEDICAL EXAMINATION AT A HEALTH CARE FACILITY: ICD-10-CM

## 2021-04-02 DIAGNOSIS — Z12.31 ENCOUNTER FOR SCREENING MAMMOGRAM FOR BREAST CANCER: ICD-10-CM

## 2021-04-02 DIAGNOSIS — Z12.11 SCREENING FOR COLON CANCER: ICD-10-CM

## 2021-04-02 PROCEDURE — 99396 PREV VISIT EST AGE 40-64: CPT | Performed by: FAMILY MEDICINE

## 2021-04-02 PROCEDURE — G0145 SCR C/V CYTO,THINLAYER,RESCR: HCPCS | Performed by: FAMILY MEDICINE

## 2021-04-02 PROCEDURE — 87624 HPV HI-RISK TYP POOLED RSLT: CPT | Performed by: FAMILY MEDICINE

## 2021-04-02 SDOH — ECONOMIC STABILITY: FOOD INSECURITY: WITHIN THE PAST 12 MONTHS, YOU WORRIED THAT YOUR FOOD WOULD RUN OUT BEFORE YOU GOT MONEY TO BUY MORE.: NEVER TRUE

## 2021-04-02 SDOH — SOCIAL STABILITY: SOCIAL NETWORK: HOW OFTEN DO YOU ATTENT MEETINGS OF THE CLUB OR ORGANIZATION YOU BELONG TO?: NOT ASKED

## 2021-04-02 SDOH — SOCIAL STABILITY: SOCIAL NETWORK: HOW OFTEN DO YOU ATTEND CHURCH OR RELIGIOUS SERVICES?: NEVER

## 2021-04-02 SDOH — ECONOMIC STABILITY: INCOME INSECURITY: HOW HARD IS IT FOR YOU TO PAY FOR THE VERY BASICS LIKE FOOD, HOUSING, MEDICAL CARE, AND HEATING?: NOT HARD AT ALL

## 2021-04-02 SDOH — SOCIAL STABILITY: SOCIAL NETWORK: IN A TYPICAL WEEK, HOW MANY TIMES DO YOU TALK ON THE PHONE WITH FAMILY, FRIENDS, OR NEIGHBORS?: ONCE A WEEK

## 2021-04-02 SDOH — SOCIAL STABILITY: SOCIAL INSECURITY
WITHIN THE LAST YEAR, HAVE TO BEEN RAPED OR FORCED TO HAVE ANY KIND OF SEXUAL ACTIVITY BY YOUR PARTNER OR EX-PARTNER?: NO

## 2021-04-02 SDOH — ECONOMIC STABILITY: FOOD INSECURITY: WITHIN THE PAST 12 MONTHS, THE FOOD YOU BOUGHT JUST DIDN'T LAST AND YOU DIDN'T HAVE MONEY TO GET MORE.: NEVER TRUE

## 2021-04-02 SDOH — SOCIAL STABILITY: SOCIAL INSECURITY
WITHIN THE LAST YEAR, HAVE YOU BEEN KICKED, HIT, SLAPPED, OR OTHERWISE PHYSICALLY HURT BY YOUR PARTNER OR EX-PARTNER?: NO

## 2021-04-02 SDOH — SOCIAL STABILITY: SOCIAL INSECURITY: WITHIN THE LAST YEAR, HAVE YOU BEEN AFRAID OF YOUR PARTNER OR EX-PARTNER?: NO

## 2021-04-02 SDOH — ECONOMIC STABILITY: TRANSPORTATION INSECURITY
IN THE PAST 12 MONTHS, HAS THE LACK OF TRANSPORTATION KEPT YOU FROM MEDICAL APPOINTMENTS OR FROM GETTING MEDICATIONS?: NO

## 2021-04-02 SDOH — ECONOMIC STABILITY: TRANSPORTATION INSECURITY
IN THE PAST 12 MONTHS, HAS LACK OF TRANSPORTATION KEPT YOU FROM MEETINGS, WORK, OR FROM GETTING THINGS NEEDED FOR DAILY LIVING?: NO

## 2021-04-02 SDOH — HEALTH STABILITY: MENTAL HEALTH
STRESS IS WHEN SOMEONE FEELS TENSE, NERVOUS, ANXIOUS, OR CAN'T SLEEP AT NIGHT BECAUSE THEIR MIND IS TROUBLED. HOW STRESSED ARE YOU?: NOT AT ALL

## 2021-04-02 SDOH — SOCIAL STABILITY: SOCIAL NETWORK: HOW OFTEN DO YOU GET TOGETHER WITH FRIENDS OR RELATIVES?: NEVER

## 2021-04-02 SDOH — SOCIAL STABILITY: SOCIAL NETWORK: ARE YOU MARRIED, WIDOWED, DIVORCED, SEPARATED, NEVER MARRIED, OR LIVING WITH A PARTNER?: NOT ASKED

## 2021-04-02 SDOH — SOCIAL STABILITY: SOCIAL INSECURITY: WITHIN THE LAST YEAR, HAVE YOU BEEN HUMILIATED OR EMOTIONALLY ABUSED IN OTHER WAYS BY YOUR PARTNER OR EX-PARTNER?: NO

## 2021-04-02 SDOH — HEALTH STABILITY: PHYSICAL HEALTH: ON AVERAGE, HOW MANY DAYS PER WEEK DO YOU ENGAGE IN MODERATE TO STRENUOUS EXERCISE (LIKE A BRISK WALK)?: NOT ASKED

## 2021-04-02 SDOH — HEALTH STABILITY: PHYSICAL HEALTH: ON AVERAGE, HOW MANY MINUTES DO YOU ENGAGE IN EXERCISE AT THIS LEVEL?: NOT ASKED

## 2021-04-02 SDOH — SOCIAL STABILITY: SOCIAL NETWORK
DO YOU BELONG TO ANY CLUBS OR ORGANIZATIONS SUCH AS CHURCH GROUPS UNIONS, FRATERNAL OR ATHLETIC GROUPS, OR SCHOOL GROUPS?: NOT ASKED

## 2021-04-02 NOTE — PATIENT INSTRUCTIONS
Preventative  1. Pap completed at [facility] today. Lab results via [employer].  2. Ordered mammogram  3. Ordered FIT test kit  4. I recommend seeing the dentist regularly  5. Follow up with me in one year for routine physical    Side Pain  1. Schedule an appointment with me for your side pain    Ear & Jaw Pain  1.Provided physical therapy referral   - I recommend physical therapy to help relax the muscles around your jaw so you don't have so much pain    Preventive Health Recommendations  Female Ages 50 - 64    Yearly exam: See your health care provider every year in order to  o Review health changes.   o Discuss preventive care.    o Review your medicines if your doctor has prescribed any.      Get a Pap test every three years (unless you have an abnormal result and your provider advises testing more often).    If you get Pap tests with HPV test, you only need to test every 5 years, unless you have an abnormal result.     You do not need a Pap test if your uterus was removed (hysterectomy) and you have not had cancer.    You should be tested each year for STDs (sexually transmitted diseases) if you're at risk.     Have a mammogram every 1 to 2 years.    Have a colonoscopy at age 50, or have a yearly FIT test (stool test). These exams screen for colon cancer.      Have a cholesterol test every 5 years, or more often if advised.    Have a diabetes test (fasting glucose) every three years. If you are at risk for diabetes, you should have this test more often.     If you are at risk for osteoporosis (brittle bone disease), think about having a bone density scan (DEXA).    Shots: Get a flu shot each year. Get a tetanus shot every 10 years.    Nutrition:     Eat at least 5 servings of fruits and vegetables each day.    Eat whole-grain bread, whole-wheat pasta and brown rice instead of white grains and rice.    Get adequate Calcium and Vitamin D.     Lifestyle    Exercise at least 150 minutes a week (30 minutes a day, 5 days  a week). This will help you control your weight and prevent disease.    Limit alcohol to one drink per day.    No smoking.     Wear sunscreen to prevent skin cancer.     See your dentist every six months for an exam and cleaning.    See your eye doctor every 1 to 2 years.

## 2021-04-02 NOTE — LETTER
April 8, 2021      Cindy Espinoza  [address] [place] [address]   [facility] [id number]        Kim White,    Your HPV test came back and was NEGATIVE. This means you do not need any further pap testing! Good work taking care of your health!    Resulted Orders   Pap imaged thin layer screen with HPV - recommended age 30 - 65 years (select HPV order below)   Result Value Ref Range    PAP NIL     Copath Report         Acc#: [id number]   Signed: 4/6/2021 11:15   MR#: [id number]    SPECIMEN/STAIN PROCESS:  Pap imaged thin layer prep screening (Surepath, FocalPoint with guided   screening)       Pap-Cyto x 1, HPV ordered x 1    SOURCE: Cervical, endocervical  ----------------------------------------------------------------   Pap imaged thin layer prep screening (Surepath, FocalPoint with guided   screening)  SPECIMEN ADEQUACY:  Satisfactory for evaluation.  -Transformation zone component present.    CYTOLOGIC INTERPRETATION:    Negative for intraepithelial lesion or malignancy    Electronically signed by:  Gibson CORBETT, (ASCP)    CLINICAL HISTORY:    Post Menopausal, A previous normal pap  Date of Last Pap: 12/15/2016,    Papanicolaou Test Limitations:  Cervical cytology is a screening test with   limited sensitivity; regular  screening is critical for cancer prevention; Pap tests are primarily   effective for the diagnosis/prevention of  squamous cell carcinoma, not adenocarcinomas or  other cancers.    The technical component of this testing was completed at the [facility], with the professional component performed   at the [facility], [address] [place] [address],   [place] [address] ([phone])       HPV High Risk Types DNA Cervical   Result Value Ref Range    HPV Source SurePath     HPV 16 DNA Negative NEG^Negative    HPV 18 DNA Negative NEG^Negative    Other HR HPV Negative  NEG^Negative    Final Diagnosis This patient's sample is negative for HPV DNA.       Comment:      This test was developed and its performance characteristics determined by the   [facility]. It   has not been cleared or approved by the FDA. The laboratory is regulated under   CLIA as qualified to perform high-complexity testing. This test is used for   clinical purposes. It should not be regarded as investigational or for   research.  (Note)  METHODOLOGY:  The Roche polo 4800 system uses automated extraction,   simultaneous amplification of HPV (L1 region) and beta-globin,    followed by  real time detection of fluorescent labeled HPV and beta   globin using specific oligonucleotide probes . The test specifically   identifies types HPV 16 DNA and HPV 18 DNA while concurrently   detecting the rest of the high risk types (31, 33, 35, 39, 45, 51,   52, 56, 58, 59, 66 or 68).  COMMENTS:  This test is not intended for use as a screening device   for women under age 30 with normal cervical cytology.  Results should   be correl ated with cytologic and histologic findings. Close clinical   followup is recommended.      Specimen Description Cervical Cells        Sincerely,    Arleth Phelps MD

## 2021-04-02 NOTE — PROGRESS NOTES
Female Physical Note          HPI      60 y/o individual presents for physical.    Concerns today: left ear pain  A [place] Leonean  was used for  this visit, Sarahi.     Preventative  Would like to get pap today. Hasn't been to the dentist, needs to schedule appointment. Would like to sign up for MyChart.    Mood  Baptism is a source of support during the pandemic and it's stressors. Not experiencing financial strain.    Sexual Health  No sexual partners, spouse passed away in 2012. Does not want to enter a new relationship or date. Has no questions regarding sexual function.     Ear Pain  Reports deep, left ear pain. Was previously checked out and and told it was normal, but continues to experience pain. Points to the area in front of ear and lower posterior jaw as location of pain. Denies tinnitus, drainage and hearing loss.      Social Hx  Lives with adult son. Her other son, Scarlet, helps care for brother.    Patient Active Problem List   Diagnosis     Hypercalcemia     Menopause present     Fibromyalgia     Chronic tension headaches     Osteoporosis     TMJ (temporomandibular joint syndrome)     Abnormal Pap smear of cervix     Caregiver burden     Chronic bilateral low back pain with right-sided sciatica     Age-related osteoporosis without current pathological fracture     Hyperparathyroidism (H)     RUQ abdominal pain     Dry eyes       Past Medical History:   Diagnosis Date     Ear pain     Left     Vitamin D deficiency        Previous Medical Care   Lela's     Family History   Problem Relation Age of Onset     Other - See Comments Son         xeroderma pigmentosum     Diabetes Son      Pulmonary Embolism Son             Review of Systems:     Review of Systems:  CONSTITUTIONAL: NEGATIVE for fever, chills, change in weight  INTEGUMENTARY/SKIN: NEGATIVE for worrisome rashes, moles or lesions  EYES: NEGATIVE for vision changes or irritation  ENT/MOUTH: NEGATIVE mouth and throat problems Positive for:  left ear pain  RESP: NEGATIVE for significant cough or SOB  BREAST: NEGATIVE for masses, tenderness or discharge  CV: NEGATIVE for chest pain, palpitations or peripheral edema  GI: NEGATIVE for nausea, abdominal pain, heartburn, or change in bowel habits  : NEGATIVE for frequency, dysuria, or hematuria  MUSCULOSKELETAL: NEGATIVE for significant arthralgias or myalgia Positive for: right side pain  NEURO: NEGATIVE for weakness, dizziness or paresthesias  ENDOCRINE: NEGATIVE for temperature intolerance, skin/hair changes  HEME/ALLERGY: NEGATIVE for bleeding problems  PSYCHIATRIC: NEGATIVE for changes in mood or affect  Sleep:   Do you snore most or the night (as reported by a family member)? No  Do you feel sleepy or extremely tired during most of the day? No    Negative for: tinnitus, drainage, hearing loss    See HPI for additional sxs.    This document serves as a record of the services and decisions personally performed and made by Arleth Phelps MD. It was created on his/her behalf by Marcus Isbell, a trained medical scribe. The creation of this document is based the provider's statements to the medical scribe.  Scribe Marcus Isbell 9:46 AM, April 2, 2021       Social History     Social History     Socioeconomic History     Marital status: Single     Spouse name: None     Number of children: 2     Years of education: None     Highest education level: None   Occupational History     Occupation: had a [employer] in [employer]     Comment: Lives with children   Social Needs     Financial resource strain: Not hard at all     Food insecurity     Worry: Never true     Inability: Never true     Transportation needs     Medical: No     Non-medical: No   Tobacco Use     Smoking status: Never Smoker     Smokeless tobacco: Never Used   Substance and Sexual Activity     Alcohol use: No     Drug use: No     Sexual activity: Not Currently   Lifestyle     Physical activity     Days per week: None     Minutes per session:  None     Stress: Not at all   Relationships     Social connections     Talks on phone: Once a week     Gets together: Never     Attends Congregational service: Never     Active member of club or organization: None     Attends meetings of clubs or organizations: None     Relationship status: None     Intimate partner violence     Fear of current or ex partner: No     Emotionally abused: No     Physically abused: No     Forced sexual activity: No   Other Topics Concern     Parent/sibling w/ CABG, MI or angioplasty before 65F 55M? Not Asked   Social History Narrative    Lives with adult son who has advanced XP and requires 24 hour care. Has another son named Scarlet, helps with care of Natan.      Marital Status: , Single  Who lives in your household? Son, natan    Has anyone hurt you physically, for example by pushing, hitting, slapping or kicking you or forcing you to have sex? Denies  Do you feel threatened or controlled by a partner, ex-partner or anyone in your life? Denies    Sexual Health     Sexual concerns: No   STI History: Neg  Pregnancy History:   LMP No LMP recorded (lmp unknown). Patient is postmenopausal.   Last Pap Smear Date:   Lab Results   Component Value Date    PAP NIL 12/15/2016    PAP NIL 2012    PAP LSIL 2012     Abnormal Pap History: See A&P    Recommended Screening     Pap/HPV cotest every 5 years for women 30-65   Recommended and patient accepted testing.   Colon CA Screening (>50-75 ):  Recommended and patient accepted testing- pt opted for FIT test.    Breast CA Screening (>41 yo or 10 y before 1st degree relative diagnosis): Recommended and patient accepted testing; last done 2019         Physical Exam:   Vitals: Blood Pressure 122/79   Pulse 91   Temperature 97.7  F (36.5  C) (Oral)   Respiration 16   Weight 73.9 kg (163 lb)   Last Menstrual Period  (LMP Unknown)   Oxygen Saturation 100%   Body Mass Index 28.21 kg/m     BMI= Body mass index is 28.21 kg/m .    Vitals were reviewed and were normal    Physical Exam:  GENERAL: healthy, alert and no distress  EYES: Eyes grossly normal to inspection, extraocular movements - intact, and PERRL  HENT: Left sided facial weakness, chronic. Points to over her TMJ for pain, left TMJ clicks with every opening. Ear canals- normal; TMs- Right TM normal, Left TM retracted with some prominent blood at 2 o'clock, hemorraghic staining and not a fluid collection behind TM normal mobility with insufflation  ; Nose- normal; Mouth- no ulcers, no lesions   NECK: Tight scalenes and sternocleidomastoid. thyroid- normal to palpation    RESP: lungs clear to auscultation - no rales, no rhonchi, no wheezes  CV: regular rates and rhythm, normal S1 S2, no S3 or S4 and no murmur, no click or rub -  ABDOMEN: soft, no tenderness, no  hepatosplenomegaly, no masses, normal bowel sounds  MS: extremities- no gross deformities noted, no edema  SKIN: no suspicious lesions, no rashes,   NEURO: strength and tone- normal, sensory exam- grossly normal, mentation- intact, speech- normal, reflexes- symmetric  BACK: no CVA tenderness, no paralumbar tenderness  - female: cervix- normal, no masses, no discharge vulvar pallor, loss of vaginal rugae and atrophy evident  PSYCH: affect- normal   LYMPHATICS: ant. cervical- normal, post. cervical- normal, supraclavicular- normal, inguinal- normal    Assessment and Plan   Cindy was seen today for physical and abdominal pain.    Diagnoses and all orders for this visit:    Low grade squamous intraepithelial lesion on cytologic smear of cervix (LGSIL)  Discussed whether we should do estrogen pre treatment but pt declines.   -     Pap imaged thin layer screen with HPV - recommended age 30 - 65 years (select HPV order below)  -     HPV High Risk Types DNA Cervical    5/31/12:  LSIL pap.  Recommendation- Colposcopy after 2 weeks of premarin vaginal cream.  8/28/12: Colpo - no obvious dysplasia.  Plan pap smear in 2 years -  8/2014.  12/15/16: NIL w/ neg HPV  PLAN: cotesting q5 yrs  -     Pap imaged thin layer screen with HPV - recommended age 30 - 65 years (select HPV order below)  -     HPV High Risk Types DNA Cervical    Routine general medical examination at a health care facility  Reviewed preventive measures   Excellent diet     Screening for colon cancer  -     Fecal colorectal cancer screen FITT; Future    Encounter for screening mammogram for breast cancer  -     Screening Mammogram Digital Bilateral; Future    TMJ (temporomandibular joint syndrome)  -     [facility], PT, HAND AND CHIROPRACTIC REFERRAL - [place]; Future      Options for treatment and follow-up care were reviewed with the patient . Cindy Espinoza and/or guardian engaged in the decision making process and verbalized understanding of the options discussed and agreed with the final plan.    The information in this document, created by the medical scribe for me, accurately reflects the services I personally performed and the decisions made by me. I have reviewed and approved this document for accuracy prior to leaving the patient care area.    Arleth Phelps MD  9:45 AM, 04/02/21

## 2021-04-02 NOTE — NURSING NOTE
Due to patient being non-English speaking/uses sign language, an  was used for this visit. Only for face-to-face interpretation by an external agency, date and length of interpretation can be found on the scanned worksheet.     name: Sarahi Olmstead  Language: Pakistani  Agency: [employer]  Phone number: [phone]  Type of interpretation: Telephone, spoken

## 2021-04-05 DIAGNOSIS — Z12.11 SCREENING FOR COLON CANCER: ICD-10-CM

## 2021-04-05 PROCEDURE — 82274 ASSAY TEST FOR BLOOD FECAL: CPT | Performed by: FAMILY MEDICINE

## 2021-04-06 LAB
COPATH REPORT: NORMAL
PAP: NORMAL

## 2021-04-07 LAB
FINAL DIAGNOSIS: NORMAL
HEMOCCULT STL QL IA: NEGATIVE
HPV HR 12 DNA CVX QL NAA+PROBE: NEGATIVE
HPV16 DNA SPEC QL NAA+PROBE: NEGATIVE
HPV18 DNA SPEC QL NAA+PROBE: NEGATIVE
SPECIMEN DESCRIPTION: NORMAL
SPECIMEN SOURCE CVX/VAG CYTO: NORMAL

## 2021-04-12 ENCOUNTER — ANCILLARY PROCEDURE (OUTPATIENT)
Dept: MAMMOGRAPHY | Facility: CLINIC | Age: 61
End: 2021-04-12
Attending: FAMILY MEDICINE
Payer: COMMERCIAL

## 2021-04-12 DIAGNOSIS — Z12.31 ENCOUNTER FOR SCREENING MAMMOGRAM FOR BREAST CANCER: ICD-10-CM

## 2021-04-12 PROCEDURE — 77067 SCR MAMMO BI INCL CAD: CPT

## 2021-04-12 PROCEDURE — 77067 SCR MAMMO BI INCL CAD: CPT | Mod: 26 | Performed by: RADIOLOGY

## 2021-04-13 ENCOUNTER — THERAPY VISIT (OUTPATIENT)
Dept: PHYSICAL THERAPY | Facility: CLINIC | Age: 61
End: 2021-04-13
Attending: FAMILY MEDICINE
Payer: COMMERCIAL

## 2021-04-13 DIAGNOSIS — M26.609 TMJ (TEMPOROMANDIBULAR JOINT SYNDROME): ICD-10-CM

## 2021-04-13 PROCEDURE — 97110 THERAPEUTIC EXERCISES: CPT | Mod: GP | Performed by: PHYSICAL THERAPIST

## 2021-04-13 PROCEDURE — 97161 PT EVAL LOW COMPLEX 20 MIN: CPT | Mod: GP | Performed by: PHYSICAL THERAPIST

## 2021-04-13 NOTE — PROGRESS NOTES
"Physical Therapy Initial Evaluation  April 13, 2021     Precautions/Restrictions/MD instructions: PT eval and treat.     Subjective:   Date of Onset: MD order on 4/2/21   C/C: left sided lateral jaw pain and just below her ear. Ear pressure. Denies hearing loss. Occasional headaches beginning at the back of her head extending in a ellis horn distribution. She reports these are unrelated to her pain at her ear/jaw.   Quality of pain is aching, sharp and stabbing. Pains are described as intermittent in nature. Pain is worse: when in a plan. Pain is rated 0-9/10.   History of symptoms: Pains began gradually as the result of unknown origins. Since onset, symptoms are intermittent when flying on an airplane.   Worsened by: Holding a phone to that ear, when flying in an air plane- when taking off and landing, changing altitudes. She reports she needs to hold her jaw and ear with her hands because she feels like her ear will \"blow up\".  Denies pain when eating, chewing, yawning, or talking, singing.  Alleviated by: pressure.    General health as reported by patient: good  Pertinent medical/surgical history: osteoporosis. She denies night pain,  significant current illness or recent hospital admission. She denies any regional implanted devices. She denies history of surgery in the area.  Imaging: none. Current occupational status: PCA part time. Patient's goals are: decrease pain, improve tolerance to flying. Return to MD:  PRN.       Objective:    Posture:   Facial Symmetry:   TMJ AROM:   Movement mm Joint noise Deviation Pain   Opening 40 mm none Min c-curve none   Left Lateral deviation  WNL      Right Lateral Deviation WNL          Cervical AROM: (Major, Moderate, Minimal or Nil loss)  Movement Loss Crescencio Mod Min Nil Pain   Protrusion    x    Flexion    x    Retraction   x     Extension   x     Left Rotation    x    Right Rotation    x    Left Side Bending    x    Right Side bending    x    Palpation:   Lateral " Pterygoid    Assessment/Plan:    The patient is a 61 year old female with chief complaint of jaw and ear pain when flying in an airplane.    The patient has the following significant findings with corresponding treatment plan.  Diagnosis 1:  TMJ    Pain -  hot/cold therapy, manual therapy and home program  Decreased ROM/flexibility - manual therapy and therapeutic exercise  Impaired muscle performance - neuro re-education  Decreased function - therapeutic activities      Therapy Evaluation Codes:   Cumulative Therapy Evaluation is: Low complexity.    Previous and current functional limitations:  (See Goal Flow Sheet for this information)    Short term and Long term goals: (See Goal Flow Sheet for this information)     Communication ability:  Patient has an  for communication clarity.    Treatment Explanation - The following has been discussed with the patient: RX ordered/plan of care, anticipated outcomes, and possible risks and side effects.  This patient would benefit from PT intervention to resume normal activities.   Rehab potential is questionable.    Frequency:  1 X week, once daily  Duration:  for 4 weeks    Symptoms of ear and jaw pain only occur with altitude changes when flying in an air plane. The presentation of symptoms may have limited benefit from PT management.     Discharge Plan: Achieve all LTGs, be independent in home treatment program, and reach maximal therapeutic benefit.    Please refer to the daily flowsheet for treatment today, total treatment time and time spent performing 1:1 timed codes.

## 2021-04-29 ENCOUNTER — THERAPY VISIT (OUTPATIENT)
Dept: PHYSICAL THERAPY | Facility: CLINIC | Age: 61
End: 2021-04-29
Payer: COMMERCIAL

## 2021-04-29 DIAGNOSIS — M26.609 TMJ (TEMPOROMANDIBULAR JOINT SYNDROME): ICD-10-CM

## 2021-04-29 PROCEDURE — 97110 THERAPEUTIC EXERCISES: CPT | Mod: GP | Performed by: PHYSICAL THERAPIST

## 2021-04-29 PROCEDURE — 97140 MANUAL THERAPY 1/> REGIONS: CPT | Mod: GP | Performed by: PHYSICAL THERAPIST

## 2021-04-29 PROCEDURE — 97112 NEUROMUSCULAR REEDUCATION: CPT | Mod: GP | Performed by: PHYSICAL THERAPIST

## 2021-05-03 ENCOUNTER — OFFICE VISIT (OUTPATIENT)
Dept: FAMILY MEDICINE | Facility: CLINIC | Age: 61
End: 2021-05-03
Payer: COMMERCIAL

## 2021-05-03 VITALS
HEART RATE: 67 BPM | OXYGEN SATURATION: 97 % | WEIGHT: 161 LBS | TEMPERATURE: 98.6 F | SYSTOLIC BLOOD PRESSURE: 115 MMHG | BODY MASS INDEX: 27.86 KG/M2 | DIASTOLIC BLOOD PRESSURE: 76 MMHG | RESPIRATION RATE: 16 BRPM

## 2021-05-03 DIAGNOSIS — R20.8 DECREASED SENSE OF VIBRATION: ICD-10-CM

## 2021-05-03 DIAGNOSIS — K43.2 INCISIONAL HERNIA, WITHOUT OBSTRUCTION OR GANGRENE: Primary | ICD-10-CM

## 2021-05-03 DIAGNOSIS — R29.810 FACIAL DROOP: ICD-10-CM

## 2021-05-03 DIAGNOSIS — H69.92 DYSFUNCTION OF LEFT EUSTACHIAN TUBE: ICD-10-CM

## 2021-05-03 DIAGNOSIS — Z13.9 SCREENING FOR CONDITION: ICD-10-CM

## 2021-05-03 PROBLEM — R10.11 RUQ ABDOMINAL PAIN: Status: RESOLVED | Noted: 2019-11-18 | Resolved: 2021-05-03

## 2021-05-03 PROCEDURE — 36415 COLL VENOUS BLD VENIPUNCTURE: CPT | Performed by: FAMILY MEDICINE

## 2021-05-03 PROCEDURE — 99215 OFFICE O/P EST HI 40 MIN: CPT | Performed by: FAMILY MEDICINE

## 2021-05-03 PROCEDURE — 83036 HEMOGLOBIN GLYCOSYLATED A1C: CPT | Performed by: FAMILY MEDICINE

## 2021-05-03 PROCEDURE — 86780 TREPONEMA PALLIDUM: CPT | Performed by: FAMILY MEDICINE

## 2021-05-03 PROCEDURE — 80061 LIPID PANEL: CPT | Performed by: FAMILY MEDICINE

## 2021-05-03 PROCEDURE — 87389 HIV-1 AG W/HIV-1&-2 AB AG IA: CPT | Performed by: FAMILY MEDICINE

## 2021-05-03 PROCEDURE — 82607 VITAMIN B-12: CPT | Performed by: FAMILY MEDICINE

## 2021-05-03 PROCEDURE — 80048 BASIC METABOLIC PNL TOTAL CA: CPT | Performed by: FAMILY MEDICINE

## 2021-05-03 NOTE — PROGRESS NOTES
Assessment & Plan     Incisional hernia, without obstruction or gangrene  - Sx are mild, discussed watchful waiting vs. if sx were bothersome and referral for surgery, she will consider.    Dysfunction of left eustachian tube  - She can use Afrin for flying, see AVS.    Screening for condition  - Lipid panel    Decreased sense of vibration  - Tested vibration sense due to a poorly described hx of muscle twitching in her entire leg, which is not a large enough amplitude, likely to be considered myoclonus. This was not observed on exam, although there was some unsteadiness with tandem gait. Discussed possibility of doing PT for rehab. Will r/o any likely causes of bilateral distal neuropathy.    - Vitamin B12  - Treponema Abs w Reflex to RPR and Titer  - HIV Antigen Antibody Combo  - Hemoglobin A1c  - Basic metabolic panel  ADDENDUM May 5, 2021 4:47 PM   Results basically normal   Will refer to PT for balance issue  Will ask pt if she wants to do EMG and consider SPEP, UPEP but 25% of cases are idiopathic.     Facial droop  Noted,Reviewed records, no hx of stroke, suspect unrelated to above      Follow up with me PRN.    The information in this document, created by the medical scribe for me, accurately reflects the services I personally performed and the decisions made by me. I have reviewed and approved this document for accuracy prior to leaving the patient care area.    Arleth Phelps MD  3:42 PM, 05/03/21    49 minutes spent on the date of the encounter doing chart review, patient visit and documentation           [facility] DARIAN White is a 61 year old who presents for the following health issues accompanied by her :    HPI     GI  Has intermittent right abd pain that is bothersome. Had similar pain prior to cholecystectomy and is still experiencing pain now. Pain is less severe and doesn't bother her every day, also doesn't bother her when bending over. Has been  fasting for Ramadan. Denies burning sensation and dizziness.    MSK  Muscle twitches began 04/28/21 in the bilateral lower extremities. Affects walking. Sleeps at 2 AM and wakes at 4 AM, notices sx are worse when she wakes. Denies brain fog.    Left Ear  Reports pressure in the left ear that occurs during flights.    Medication Reconciliation  No longer taking nortriptyline 50 mg at night. Sleep has improved with sleep med, uncertain what the name is.     Review of Systems     Positive for: abd pain, muscle twitches    Negative for: dizziness, abdominal burning sensation, brain fog    This document serves as a record of the services and decisions personally performed and made by Arleth Phelps MD. It was created on his/her behalf by Marcus Isbell, a trained medical scribe. The creation of this document is based the provider's statements to the medical scribe.  Scribe Marcus Isbell 3:42 PM, May 3, 2021    Objective    Blood Pressure 115/76   Pulse 67   Temperature 98.6  F (37  C) (Oral)   Respiration 16   Weight 73 kg (161 lb)   Last Menstrual Period  (LMP Unknown)   Oxygen Saturation 97%   Body Mass Index 27.86 kg/m    Body mass index is 27.86 kg/m .   Vitals were reviewed and were normal.    Physical Exam   GENERAL: healthy, alert and no distress  HENT: Eyes: chronic left facial droop. Ears- the left TM sits at a slightly retracted position  ABDOMEN: soft, non tender in the epigastrium, point of tenderness is just to the right of the epigastrium, no rebound, normal bowel sounds. RUQ over incision has increased pain with holding her head above the bead with a mild prominence at that site.   MS: Normal muscle bulk. Negative romberg, no pronator drift. Unsteady with tandem gait and unsteady with balancing on either foot. 5/5 flexion and extension of the knee bilaterally. Symmetric patellar reflexes bilaterally. Symmetric achilles reflexes bilaterally. Straight leg raise is positive at approximately 70 degrees  bilaterally.   L facial droop  Sensation to light touch bilateral lower extremities. Bilateral loss of vibration sense at the medial malleoli. In general she is stiff getting up and down from the exam table.   SKIN: An irregular linear scar on the LLQ, a linear scar on the RLQ that appears consistent with laparoscopic surgery and a RUQ linear scar. In addition, has circular hyperpigmented scarring from traditional medicines.  PSYCH: affect normal

## 2021-05-03 NOTE — LETTER
May 5, 2021      Cindy Espinoza  [address] [place] [address]   [facility] [address]        Dear Cindy,    Thank you for getting your care at [facility]. Please see below for your test results. They were pretty normal, so I think we need to get you into physical therapy for your balance, and see how that goes.     Resulted Orders   Vitamin B12   Result Value Ref Range    Vitamin B12 497 193 - 986 pg/mL   Treponema Abs w Reflex to RPR and Titer   Result Value Ref Range    Treponema Antibodies Nonreactive NR^Nonreactive      Comment:      Methodology Change: Test performed on the [employer] XL by Treponema   pallidum Total Antibodies Assay as of 3.17.2020.     HIV Antigen Antibody Combo   Result Value Ref Range    HIV Antigen Antibody Combo Nonreactive NR^Nonreactive          Comment:      HIV-1 p24 Ag & HIV-1/HIV-2 Ab Not Detected   Hemoglobin A1c   Result Value Ref Range    Hemoglobin A1C 5.6 0 - 5.6 %      Comment:      Normal <5.7% Prediabetes 5.7-6.4%  Diabetes 6.5% or higher - adopted from ADA   consensus guidelines.     Basic metabolic panel   Result Value Ref Range    Sodium 136 133 - 144 mmol/L    Potassium 3.9 3.4 - 5.3 mmol/L    Chloride 106 94 - 109 mmol/L    Carbon Dioxide 27 20 - 32 mmol/L    Anion Gap 4 3 - 14 mmol/L    Glucose 95 70 - 99 mg/dL    Urea Nitrogen 18 7 - 30 mg/dL    Creatinine 0.63 0.52 - 1.04 mg/dL    GFR Estimate >90 >60 mL/min/[1.73_m2]      Comment:      Non  GFR Calc  Starting 12/18/2018, serum creatinine based estimated GFR (eGFR) will be   calculated using the Chronic Kidney Disease Epidemiology Collaboration   (CKD-EPI) equation.      GFR Estimate If Black >90 >60 mL/min/[1.73_m2]      Comment:       GFR Calc  Starting 12/18/2018, serum creatinine based estimated GFR (eGFR) will be   calculated using the Chronic Kidney Disease Epidemiology Collaboration   (CKD-EPI) equation.      Calcium 10.1 8.5 - 10.1 mg/dL   Lipid panel    Result Value Ref Range    Cholesterol 221 (H) <200 mg/dL      Comment:      Desirable:       <200 mg/dl    Triglycerides 117 <150 mg/dL    HDL Cholesterol 39 (L) >49 mg/dL    LDL Cholesterol Calculated 159 (H) <100 mg/dL      Comment:      Above desirable:  100-129 mg/dl  Borderline High:  130-159 mg/dL  High:             160-189 mg/dL  Very high:       >189 mg/dl      Non HDL Cholesterol 182 (H) <130 mg/dL      Comment:      Above Desirable:  130-159 mg/dl  Borderline high:  160-189 mg/dl  High:             190-219 mg/dl  Very high:       >219 mg/dl           Sincerely,    Arleth Phelps MD

## 2021-05-03 NOTE — PATIENT INSTRUCTIONS
Hernia  1. If you have blood in your stools, I would recommend following up with a surgeon to have it repaired.    Muscle Tremors  1. Ordered labs today. Results via letter.  2. If your symptoms don't get better after Ramadan, I recommend seeing a physical therapist.    Left Ear Pressure  1. You can use over the counter Afrin before flights  - Use the same day as your flight.  - Do not use for more than three days at a time.

## 2021-05-03 NOTE — NURSING NOTE
Due to patient being non-English speaking/uses sign language, an  was used for this visit. Only for face-to-face interpretation by an external agency, date and length of interpretation can be found on the scanned worksheet.     name: Sarahi Olmstead  Language: Canadian  Agency: [employer]  Phone number: [phone]  Type of interpretation: Telephone, spoken

## 2021-05-04 LAB
ANION GAP SERPL CALCULATED.3IONS-SCNC: 4 MMOL/L (ref 3–14)
BUN SERPL-MCNC: 18 MG/DL (ref 7–30)
CALCIUM SERPL-MCNC: 10.1 MG/DL (ref 8.5–10.1)
CHLORIDE SERPL-SCNC: 106 MMOL/L (ref 94–109)
CHOLEST SERPL-MCNC: 221 MG/DL
CO2 SERPL-SCNC: 27 MMOL/L (ref 20–32)
CREAT SERPL-MCNC: 0.63 MG/DL (ref 0.52–1.04)
GFR SERPL CREATININE-BSD FRML MDRD: >90 ML/MIN/{1.73_M2}
GLUCOSE SERPL-MCNC: 95 MG/DL (ref 70–99)
HBA1C MFR BLD: 5.6 % (ref 0–5.6)
HDLC SERPL-MCNC: 39 MG/DL
LDLC SERPL CALC-MCNC: 159 MG/DL
NONHDLC SERPL-MCNC: 182 MG/DL
POTASSIUM SERPL-SCNC: 3.9 MMOL/L (ref 3.4–5.3)
SODIUM SERPL-SCNC: 136 MMOL/L (ref 133–144)
TRIGL SERPL-MCNC: 117 MG/DL
VIT B12 SERPL-MCNC: 497 PG/ML (ref 193–986)

## 2021-05-05 LAB
HIV 1+2 AB+HIV1 P24 AG SERPL QL IA: NONREACTIVE
T PALLIDUM AB SER QL: NONREACTIVE

## 2021-05-26 ENCOUNTER — THERAPY VISIT (OUTPATIENT)
Dept: PHYSICAL THERAPY | Facility: CLINIC | Age: 61
End: 2021-05-26
Attending: FAMILY MEDICINE
Payer: COMMERCIAL

## 2021-05-26 DIAGNOSIS — R20.8 DECREASED SENSE OF VIBRATION: ICD-10-CM

## 2021-05-26 PROCEDURE — 97161 PT EVAL LOW COMPLEX 20 MIN: CPT | Mod: GP | Performed by: PHYSICAL THERAPIST

## 2021-05-26 NOTE — PROGRESS NOTES
[facility]        OUTPATIENT PHYSICAL THERAPY FUNCTIONAL EVALUATION  PLAN OF TREATMENT FOR OUTPATIENT REHABILITATION  (COMPLETE FOR INITIAL CLAIMS ONLY)  Patient's Last Name, First Name, M.I.  YOB: 1960  Cindy Espinoza     Provider's Name   [facility]   Medical Record No.  [id number]     Start of Care Date:  05/26/21   Onset Date:   5/3/21   Type:     _X__PT   ____OT  ____SLP Medical Diagnosis:  decreased vibration sense     PT Diagnosis:  past LE tremor of unknown etiology Visits from SOC:  1                              __________________________________________________________________________________  Plan of Treatment/Functional Goals:         Evaluation only, no PT treatment indicated             Madhav Reyes, PT                                    I CERTIFY THE NEED FOR THESE SERVICES FURNISHED UNDER        THIS PLAN OF TREATMENT AND WHILE UNDER MY CARE     (Physician attestation of this document indicates review and certification of the therapy plan).                Certification Date From:  05/26/21   Certification Date To:  05/27/21    Referring Provider:  Arleth Phelps MD    Initial Assessment  See Epic Evaluation- Start of Care Date: 05/26/21

## 2021-06-09 ENCOUNTER — OFFICE VISIT (OUTPATIENT)
Dept: FAMILY MEDICINE | Facility: CLINIC | Age: 61
End: 2021-06-09
Payer: COMMERCIAL

## 2021-06-09 VITALS
SYSTOLIC BLOOD PRESSURE: 114 MMHG | TEMPERATURE: 98.5 F | WEIGHT: 163 LBS | OXYGEN SATURATION: 94 % | BODY MASS INDEX: 28.21 KG/M2 | DIASTOLIC BLOOD PRESSURE: 75 MMHG | HEART RATE: 81 BPM | RESPIRATION RATE: 16 BRPM

## 2021-06-09 DIAGNOSIS — E78.2 MIXED HYPERLIPIDEMIA: ICD-10-CM

## 2021-06-09 DIAGNOSIS — L76.82 INCISIONAL PAIN: ICD-10-CM

## 2021-06-09 DIAGNOSIS — R20.8 DECREASED SENSE OF VIBRATION: Primary | ICD-10-CM

## 2021-06-09 DIAGNOSIS — Z23 NEED FOR SHINGLES VACCINE: ICD-10-CM

## 2021-06-09 PROCEDURE — 99N1036 PR STATISTIC TOTAL PROTEIN: Performed by: FAMILY MEDICINE

## 2021-06-09 PROCEDURE — 36415 COLL VENOUS BLD VENIPUNCTURE: CPT | Performed by: FAMILY MEDICINE

## 2021-06-09 PROCEDURE — 84165 PROTEIN E-PHORESIS SERUM: CPT | Performed by: FAMILY MEDICINE

## 2021-06-09 PROCEDURE — 99213 OFFICE O/P EST LOW 20 MIN: CPT | Performed by: FAMILY MEDICINE

## 2021-06-09 NOTE — PROGRESS NOTES
19 minutes spent on the date of the encounter doing chart review, review of test results, interpretation of tests, patient visit and documentation      detailed exam

## 2021-06-09 NOTE — PROGRESS NOTES
Assessment & Plan     Decreased sense of vibration  - Unclear significance, did go to PT and has no identifiable balance or strength issues, no f/u needed. Reviewed labs in detail for work up of this -normal lytes, b12, glu, RPR, HIV. Discussed that 25% of cases are idiopathic. Would like to proceed with EMG right now and proceed with SPEP and UPEP; after results will consider if EMG is appropriate.  - Protein electrophoresis  - Protein electrophoresis random urine    Incisional pain  - Seems to be improving, no need to f/u.    Mixed hyperlipidemia  - No meds and recommend dietary plant fats and high vegetable content. F/u in one year.   - ASCVD is 5.4.    Need for shingles vaccine  - Discussed need for this, she declines.      The information in this document, created by the medical scribe for me, accurately reflects the services I personally performed and the decisions made by me. I have reviewed and approved this document for accuracy prior to leaving the patient care area.  Arleth Phelps MD  4:03 PM, 06/09/21    [facility] DARIAN White is a 61 year old who presents for the following health issues  accompanied by her son, Osbaldo:    HPI     05/03/21 note reviewed- right sided pain, suspect r/t incisional hernia. At that time we discussed watchful waiting vs. referral for surgery     We also referred her to PT for decreased vibration sense and it appears she saw them on 05/26/21.    Preventative  Declines shingles vaccine today.    Cholesterol  Has been eating vegetables.    GI  Report right-sided abd pain. Has questions about potential imaging.    Decreased Sense of Vibration  Reports that sx have resolved.     Review of Systems     Positive for: right-sided abd pain,    See HPI for additional sx.    This document serves as a record of the services and decisions personally performed and made by Arleth Phelps MD. It was created on his/her behalf by Marcus Isbell, a trained medical  isaac. The creation of this document is based the provider's statements to the medical scribe.  Isaac Isbell 4:03 PM, June 9, 2021        Objective    Blood Pressure 114/75   Pulse 81   Temperature 98.5  F (36.9  C) (Oral)   Respiration 16   Weight 73.9 kg (163 lb)   Last Menstrual Period  (LMP Unknown)   Oxygen Saturation 94%   Body Mass Index 28.21 kg/m    Body mass index is 28.21 kg/m .   Vitals were reviewed and were normal.    Physical Exam   GENERAL: healthy, alert and no distress  RESP: lungs clear to auscultation - no rales, rhonchi or wheezes  CV: regular rate and rhythm, normal S1 S2, no S3 or S4, no murmur, click or rub, no peripheral edema and peripheral pulses strong  ABDOMEN: soft, nontender, no hepatosplenomegaly, no masses and bowel sounds normal  NEURO: Decreased vibration sense on the bilateral lateral malleoli.  PSYCH: affect normal

## 2021-06-09 NOTE — LETTER
June 28, 2021      Cindy EDMAR Espinoza  [address] [place] [address]   [facility] [address]        Kim Wallercristy:     Your results are below and are normal.   I think you have the type of problem where we do not find the cause (it happens about 1 in 4 times this way).     I hope you and family are doing well.   Resulted Orders   Protein electrophoresis   Result Value Ref Range    Albumin Fraction 4.2 3.7 - 5.1 g/dL    Alpha 1 Fraction 0.4 0.2 - 0.4 g/dL    Alpha 2 Fraction 1.0 (H) 0.5 - 0.9 g/dL    Beta Fraction 0.8 0.6 - 1.0 g/dL    Gamma Fraction 1.6 0.7 - 1.6 g/dL    Monoclonal Peak 0.0 0.0 g/dL    ELP Interpretation:       Essentially normal electrophoretic pattern.  No obvious monoclonal proteins seen.    Pathologic significance requires clinical correlation.  SHARON Diane M.D., Ph.D.,   Pathologist ().         If you have any concerns about these results please call and leave a message for me or send a [facility] message to the clinic.    Sincerely,    Arleth Phelps MD

## 2021-06-09 NOTE — NURSING NOTE
Due to patient being non-English speaking/uses sign language, an  was used for this visit. Only for face-to-face interpretation by an external agency, date and length of interpretation can be found on the scanned worksheet.     name: Sarahi Olmstead  Language: Anguillan  Agency: [employer]  Phone number: [phone]  Type of interpretation: Telephone, spoken

## 2021-06-10 LAB
ALBUMIN SERPL ELPH-MCNC: 4.2 G/DL (ref 3.7–5.1)
ALPHA1 GLOB SERPL ELPH-MCNC: 0.4 G/DL (ref 0.2–0.4)
ALPHA2 GLOB SERPL ELPH-MCNC: 1 G/DL (ref 0.5–0.9)
B-GLOBULIN SERPL ELPH-MCNC: 0.8 G/DL (ref 0.6–1)
GAMMA GLOB SERPL ELPH-MCNC: 1.6 G/DL (ref 0.7–1.6)
M PROTEIN SERPL ELPH-MCNC: 0 G/DL
PROT PATTERN SERPL ELPH-IMP: ABNORMAL

## 2021-07-12 ENCOUNTER — OFFICE VISIT (OUTPATIENT)
Dept: FAMILY MEDICINE | Facility: CLINIC | Age: 61
End: 2021-07-12
Payer: COMMERCIAL

## 2021-07-12 DIAGNOSIS — M54.50 CHRONIC BILATERAL LOW BACK PAIN WITHOUT SCIATICA: ICD-10-CM

## 2021-07-12 DIAGNOSIS — G89.29 CHRONIC BILATERAL LOW BACK PAIN WITHOUT SCIATICA: ICD-10-CM

## 2021-07-12 DIAGNOSIS — M81.0 AGE-RELATED OSTEOPOROSIS WITHOUT CURRENT PATHOLOGICAL FRACTURE: ICD-10-CM

## 2021-07-12 DIAGNOSIS — M79.7 FIBROMYALGIA: Primary | ICD-10-CM

## 2021-07-12 PROCEDURE — 99214 OFFICE O/P EST MOD 30 MIN: CPT | Mod: GC | Performed by: STUDENT IN AN ORGANIZED HEALTH CARE EDUCATION/TRAINING PROGRAM

## 2021-07-12 PROCEDURE — 84166 PROTEIN E-PHORESIS/URINE/CSF: CPT | Performed by: PATHOLOGY

## 2021-07-12 RX ORDER — ACETAMINOPHEN 500 MG
1000 TABLET ORAL EVERY 8 HOURS PRN
Qty: 100 TABLET | Refills: 11 | Status: SHIPPED | OUTPATIENT
Start: 2021-07-12 | End: 2021-07-23

## 2021-07-12 NOTE — PROGRESS NOTES
Assessment & Plan     Fibromyalgia  Age-related osteoporosis without current pathological fracture  Chronic bilateral low back pain without sciatica  Ms. Espinoza reports that she has chronic lower back pain, likely secondary to osteoporosis and increased stress on her lumbar paraspinals. No sciatica, no red flag symptoms. Has tried PT in the past with relief but stopped going d/t COVID19.  Per chart review, has decreased sense of vibration with negative workup but did not bring this up with me today. If symptoms do not resolve with PT, I recommended that she return to see my for OMT assessment and treatment.  - advised to apply heat or ice to the area for relief  - PHYSICAL THERAPY REFERRAL - INTERNAL; Future  - acetaminophen (TYLENOL) 500 MG tablet; Take 2 tablets (1,000 mg) by mouth every 8 hours as needed for pain    Dafne Arechiga DO  [facility] 61 [facility] who presents for the following health issues.    HPI     Chronic/Recurring Back Pain Follow Up      Where is your back pain located? (Select all that apply) low back bilateral, is the worst in the morning, gets better when she moves but then gets worse throughout the day as she's moving.    How would you describe your back pain?  dull ache    Where does your back pain spread? Nowhere but the muscles ache    Since your last clinic visit for back pain, how has your pain changed? gradually worsening    Since your last visit, have you tried any new treatment? No Has been to physical therapy in the past but stopped due to COVID 19.     Denies any incontinence, new numbness or tingling in her feet, sharp stabbing pain. Per chart review, DEXA scan in 2012 showed lowest T-score of -3.0 in the lumbar spine. However, she did not pursue surgical intervention and is not interested in that treatment route at this time. She is due for another DEXA but has not completed this.    Review of Systems   Denies any radiating  pain down either buttock or leg, new falls, numbness or tingling in her legs or feet, incontinence.      Objective    LMP  (LMP Unknown)   There is no height or weight on file to calculate BMI.     Physical Exam     General: Alert and oriented, in no acute distress.  Skin: Warm and dry, no abnormalities noted.  Eyes: Extra-ocular muscles intact, pupils equal and reactive.  ENT: Speech intact, nasal passages open, no hearing impairment noted.  CV: No cyanosis or pallor, warm and well perfused.  Respiratory: No respiratory distress, no accessory muscle use.  Neuro/MSK: Gait and station normal but slowed. No point tenderness over vertebrae. Hypertonic paraspinal muscles bilateral lumbar region. No tenderpoints in gluteus  Psychiatric: Mood and affect appear normal.

## 2021-07-12 NOTE — PATIENT INSTRUCTIONS
Patient Education   Here is the plan from today's visit    1. Fibromyalgia  2. Age-related osteoporosis without current pathological fracture  3. Chronic bilateral low back pain without sciatica  - PHYSICAL THERAPY REFERRAL - INTERNAL; Future  - acetaminophen (TYLENOL) 500 MG tablet; Take 2 tablets (1,000 mg) by mouth every 8 hours as needed for pain  Dispense: 100 tablet; Refill: 11    You will give urine today that Dr. Phelps ordered. She will let you know of the results.    Please call or return to clinic if your symptoms don't go away.    Follow up plan  No follow-ups on file.    Thank you for coming to [facility] today.  COVID-19 Vaccine:  If you are eligible for the COVID-19 vaccine, you can schedule via [facility] or call [facility] at [address] [place] [address]. If you need assistance with scheduling, please speak to a Care Coordinator or your provider.   Lab Testing:  **If you had lab testing today and your results are reassuring or normal they will be mailed to you or sent through [facility] within 7 days.   **If the lab tests need quick action we will call you with the results.  **If you are having labs done on a different day, please call [phone] to schedule at [facility] or [phone] for other [facility] locations.   The phone number we will call with results is # [phone] (home) . If this is not the best number please call our clinic and change the number.  Medication Refills:  If you need any refills please call your pharmacy and they will contact us.   If you need to  your refill at a new pharmacy, please contact the new pharmacy directly. The new pharmacy will help you get your medications transferred faster.   Scheduling:  If you have any concerns about today's visit or wish to schedule another appointment please call our office during normal business hours [phone] (8-5:00 M-F)  If a referral was made to a [facility] and you don't get a call  from central scheduling please call [phone].  If a Mammogram was ordered for you at [facility] call [phone] to schedule or change your appointment.  If you had an EKG/XRay/CT/Ultrasound/MRI ordered the number is [phone] to schedule or change your radiology appointment.   Medical Concerns:  If you have urgent medical concerns please call [phone] at any time of the day.    Dafne Arechiga, DO

## 2021-07-13 NOTE — PROGRESS NOTES
Preceptor Attestation:   Patient seen, evaluated and discussed with the resident. I have verified the content of the note, which accurately reflects my assessment of the patient and the plan of care.   Supervising Physician:  Aleyda Damon, DO

## 2021-07-15 LAB
ALBUMIN MFR UR ELPH: 100 %
PROT PATTERN UR ELPH-IMP: ABNORMAL

## 2021-07-23 ENCOUNTER — OFFICE VISIT (OUTPATIENT)
Dept: FAMILY MEDICINE | Facility: CLINIC | Age: 61
End: 2021-07-23
Payer: COMMERCIAL

## 2021-07-23 VITALS
OXYGEN SATURATION: 95 % | RESPIRATION RATE: 16 BRPM | DIASTOLIC BLOOD PRESSURE: 85 MMHG | TEMPERATURE: 98 F | HEART RATE: 92 BPM | SYSTOLIC BLOOD PRESSURE: 137 MMHG | WEIGHT: 165 LBS | BODY MASS INDEX: 28.55 KG/M2

## 2021-07-23 DIAGNOSIS — E21.3 HYPERPARATHYROIDISM (H): ICD-10-CM

## 2021-07-23 DIAGNOSIS — G89.29 CHRONIC BILATERAL LOW BACK PAIN WITHOUT SCIATICA: ICD-10-CM

## 2021-07-23 DIAGNOSIS — M54.50 CHRONIC BILATERAL LOW BACK PAIN WITHOUT SCIATICA: ICD-10-CM

## 2021-07-23 DIAGNOSIS — Z29.89 NEED FOR MALARIA PROPHYLAXIS: Primary | ICD-10-CM

## 2021-07-23 DIAGNOSIS — J30.2 SEASONAL ALLERGIC RHINITIS, UNSPECIFIED TRIGGER: ICD-10-CM

## 2021-07-23 DIAGNOSIS — Z23 ENCOUNTER FOR IMMUNIZATION: ICD-10-CM

## 2021-07-23 PROCEDURE — 90715 TDAP VACCINE 7 YRS/> IM: CPT | Performed by: FAMILY MEDICINE

## 2021-07-23 PROCEDURE — 99214 OFFICE O/P EST MOD 30 MIN: CPT | Mod: 25 | Performed by: FAMILY MEDICINE

## 2021-07-23 PROCEDURE — 90471 IMMUNIZATION ADMIN: CPT | Mod: 59 | Performed by: FAMILY MEDICINE

## 2021-07-23 RX ORDER — FLUTICASONE PROPIONATE 50 MCG
2 SPRAY, SUSPENSION (ML) NASAL DAILY
Qty: 18.2 ML | Refills: 3 | Status: SHIPPED | OUTPATIENT
Start: 2021-07-23 | End: 2023-01-26

## 2021-07-23 RX ORDER — CHOLECALCIFEROL (VITAMIN D3) 50 MCG
1 TABLET ORAL 2 TIMES DAILY
Qty: 60 TABLET | Refills: 3 | Status: SHIPPED | OUTPATIENT
Start: 2021-07-23 | End: 2022-07-15

## 2021-07-23 RX ORDER — ATOVAQUONE AND PROGUANIL HYDROCHLORIDE 250; 100 MG/1; MG/1
1 TABLET, FILM COATED ORAL DAILY
Qty: 60 TABLET | Refills: 0 | Status: SHIPPED | OUTPATIENT
Start: 2021-07-23 | End: 2022-03-09

## 2021-07-23 RX ORDER — ACETAMINOPHEN 500 MG
1000 TABLET ORAL EVERY 8 HOURS PRN
Qty: 100 TABLET | Refills: 11 | Status: SHIPPED | OUTPATIENT
Start: 2021-07-23 | End: 2023-01-26

## 2021-07-23 NOTE — NURSING NOTE
Due to patient being non-English speaking/uses sign language, an  was used for this visit. Only for face-to-face interpretation by an external agency, date and length of interpretation can be found on the scanned worksheet.     name: Sarahi Olmstead  Language: Georgian  Agency: [employer]  Phone number: [phone]  Type of interpretation:Telephone, spoken

## 2021-07-28 NOTE — PROGRESS NOTES
ASSESSMENT/PLAN:   There are no Patient Instructions on file for this visit.      ICD-10-CM    1. Need for malaria prophylaxis  Z29.8 atovaquone-proguanil (MALARONE) 250-100 MG tablet   2. Seasonal allergic rhinitis, unspecified trigger  J30.2 fluticasone (FLONASE) 50 MCG/ACT nasal spray   3. Chronic bilateral low back pain without sciatica  M54.5 acetaminophen (TYLENOL) 500 MG tablet    G89.29    4. Hyperparathyroidism (H)  E21.3 vitamin D3 (CHOLECALCIFEROL) 50 mcg (2000 units) tablet   5. Encounter for immunization  Z23 TDAP VACCINE (Adacel, Boostrix)  [[id number]]     See discussions below.  Plan to see patient back when she returns from [place].  >30 minutes spent on this patient in direct care and documentation.  SUBJECTIVE:   Cindy Espinoza is a 61 year old female who presents to clinic today for the following health issues:  1. Travel advice--doesn't have time to go to travel clinic as leaving in just a few days.  Would like some malaria prophylaxis and med refills, and to double check on general vaccine status.  Is due for tetanus.    2. Allergies--has had increased congestion and ear symptoms.  Worried about the plane flight.  Talked about options and that nasal steroids often work best for otalgia/eustacian tube issues.  She stated that her ears can get very painful.  Is interested in starting the flonase.  Has chronic allergy issues.  Encouraged to try the flonase chronically and see if it helps.  3. Med refills--would like the rest of her refills before leaving.  Urged to come in for a review of these when she returns.      Problem list and histories reviewed & adjusted, as indicated.  Additional history: as documented    Patient Active Problem List   Diagnosis     Hypercalcemia     Menopause present     Fibromyalgia     Chronic tension headaches     Osteoporosis     TMJ (temporomandibular joint syndrome)     Abnormal Pap smear of cervix     Caregiver burden     Chronic bilateral low back pain with  right-sided sciatica     Age-related osteoporosis without current pathological fracture     Hyperparathyroidism (H)     Dry eyes     Decreased sense of vibration     Facial droop     Mixed hyperlipidemia     Need for shingles vaccine     Past Surgical History:   Procedure Laterality Date     LAPAROSCOPIC CHOLECYSTECTOMY  03/2019    [facility]       Social History     Tobacco Use     Smoking status: Never Smoker     Smokeless tobacco: Never Used   Substance Use Topics     Alcohol use: No     Family History   Problem Relation Age of Onset     Other - See Comments Son         xeroderma pigmentosum     Diabetes Son      Pulmonary Embolism Son          Current Outpatient Medications   Medication Sig Dispense Refill     acetaminophen (TYLENOL) 500 MG tablet Take 2 tablets (1,000 mg) by mouth every 8 hours as needed for pain 100 tablet 11     atovaquone-proguanil (MALARONE) 250-100 MG tablet Take 1 tablet by mouth daily Start 2 days before travel and continue 7 days after return. 60 tablet 0     bimatoprost (LUMIGAN) 0.03 % ophthalmic solution Place 1 drop into both eyes At Bedtime 5 mL 3     capsaicin (ZOSTRIX) 0.025 % external cream Apply 1 Application topically 2 times daily as needed       cyclobenzaprine (FLEXERIL) 5 MG tablet Take 1 tablet by mouth nightly as needed       fluticasone (FLONASE) 50 MCG/ACT nasal spray Spray 2 sprays into both nostrils daily 18.2 mL 3     latanoprost (XALATAN) 0.005 % ophthalmic solution 1 DROP IN BOTH EYES NIGHTLY 2.5 mL 4     LUMIGAN 0.01 % SOLN ophthalmic solution Apply 1 drop to eye At Bedtime       CHERI 128 5 % ophthalmic ointment Apply 1 Application to eye At Bedtime       omega 3 1000 MG CAPS Take 1 g by mouth daily 90 capsule 3     polyethylene glycol (MIRALAX) 17 GM/Dose powder Take 17 g (1 capful) by mouth daily As needed when constipation symptoms worsen 510 g 1     psyllium (METAMUCIL/KONSYL) 58.6 % powder Take 6 g (1 teaspoonful) by mouth daily 1 Bottle 11     RESTASIS 0.05 %  ophthalmic emulsion Place 1 drop into both eyes every 4 hours as needed for dry eyes 5.5 mL 3     TRAVATAN Z 0.004 % ophthalmic solution Apply 1 drop to eye daily       vitamin D3 (CHOLECALCIFEROL) 50 mcg (2000 units) tablet Take 1 tablet (50 mcg) by mouth 2 times daily 60 tablet 3     vitamin D3 (CHOLECALCIFEROL) 50 mcg (2000 units) tablet Take 1 tablet (50 mcg) by mouth daily 100 tablet 3     Allergies   Allergen Reactions     Contrast Dye Itching and Rash     Iodine Rash       Reviewed and updated as needed this visit by clinical staff  Tobacco  Allergies  Meds   Med Hx  Surg Hx  Fam Hx  Soc Hx      Reviewed and updated as needed this visit by Provider                 ROS:  Constitutional, HEENT, cardiovascular, pulmonary, gi and gu systems are negative, except as otherwise noted.    OBJECTIVE:     /85   Pulse 92   Temp 98  F (36.7  C) (Oral)   Resp 16   Wt 74.8 kg (165 lb)   LMP  (LMP Unknown)   SpO2 95%   BMI 28.55 kg/m    Body mass index is 28.55 kg/m .  GENERAL: healthy, alert and no distress  HENT: ear canals and TM's do have small amount of fluid behind them,   RESP: lungs clear to auscultation - no rales, rhonchi or wheezes  CV: regular rate and rhythm, normal S1 S2, no S3 or S4, no murmur, click or rub, no peripheral edema and peripheral pulses strong  MS: no gross musculoskeletal defects noted, no edema    Diagnostic Test Results:  No results found for any visits on 07/23/21.        Kentrell Prasad MD  [facility]

## 2021-11-04 ENCOUNTER — TELEPHONE (OUTPATIENT)
Dept: FAMILY MEDICINE | Facility: CLINIC | Age: 61
End: 2021-11-04

## 2021-11-04 ENCOUNTER — OFFICE VISIT (OUTPATIENT)
Dept: FAMILY MEDICINE | Facility: CLINIC | Age: 61
End: 2021-11-04
Payer: COMMERCIAL

## 2021-11-04 DIAGNOSIS — Z75.8 LANGUAGE BARRIER AFFECTING HEALTH CARE: ICD-10-CM

## 2021-11-04 DIAGNOSIS — Z60.3 LANGUAGE BARRIER AFFECTING HEALTH CARE: ICD-10-CM

## 2021-11-04 DIAGNOSIS — E21.3 HYPERPARATHYROIDISM (H): Primary | ICD-10-CM

## 2021-11-04 PROCEDURE — 99214 OFFICE O/P EST MOD 30 MIN: CPT | Mod: GC | Performed by: STUDENT IN AN ORGANIZED HEALTH CARE EDUCATION/TRAINING PROGRAM

## 2021-11-04 RX ORDER — LATANOPROSTENE BUNOD 0.24 MG/ML
SOLUTION/ DROPS OPHTHALMIC
COMMUNITY
Start: 2021-10-25 | End: 2023-09-02

## 2021-11-04 SDOH — SOCIAL STABILITY - SOCIAL INSECURITY: ACCULTURATION DIFFICULTY: Z60.3

## 2021-11-04 NOTE — PROGRESS NOTES
Assessment & Plan     Hyperparathyroidism (H)  Language barrier affecting health care  Cindy was seen today at our [facility] location. Patient diagnosed with primary Hyperparathyroidism in 2012 and she saw  Dr. Aguilar (Endocrine, [facility]) in 2012 and was diagnosed with primary hyperparathyroidism, and recommended parathyroidectomy due to osteoporosis. Pt in 2018 saw Dr. Hoskins with same recommendation. Pt has not followed through with surgery as she is afraid of the procedure.     Much decision today with the patient with our community health worker EYAD Serrato. She is contemplating surgery but is still worried about the procedure. I also feel she would benefit from injectable / infusion for bone health. She trailed fosamax previously however found weekly dosing difficult. She is interested in an injectable version, it appears Reclast PA was approved back in 2018 but she did not follow through with starting this medication. Pt is amenable to return to endocrine for further discussions regarding management of her hyperparathyroidism, referral placed. Most recent DEXA 2018 z score lumbar -3.3.     - DX Hip/Pelvis/Spine (DEXA)  - Adult Endocrinology Referral  - DX Wrist Heel Radius  - Vitamin D deficiency screening    Also discussed with the patient:   Vitamin D from 4000 daily. The goal is to maintain level of at least 33 (will recheck)  Avoid factors that can aggravate hypercalcemia if possible, including thiazide diuretic , high-calcium diet (cheese, yogurt, milk).   Physical activity to minimize bone resorption.  Adequate hydration (at least six to eight glasses of water per day) to minimize the risk of nephrolithiasis.  Maintain a moderate calcium intake (1000 mg/day)     Review of external notes as documented elsewhere in note  Review of the result(s) of each unique test - see AP  Diagnosis or treatment significantly limited by social determinants of health - language  barrier  Ordering of each unique test    Follow-up: As needed    DO HARLEY Lechuga [facility] JAX White is a 61 year old who presents for the following health issues.     HPI     Patient presents wondering if she needs a scan?  Back and forth with interpretation - not Mammogram but DEXA  Thinks did Dexa at some point doesn't know when   She is taking vitamin D, appears to be at least 4,000 units  Does not take calcium  Has likely 2 servings of either yogurt / milk a day  Drinks lots of water  Was on Fosomax, but forgot to take it so really never did    Interview conducted via Mongolian interpretor by our community health worker RN Ama    Review of Systems   Symptoms of hypercalcemia: no constipation, no dyspepsia, no mental status changes/lethargy, no polyuria.     Objective    LMP  (LMP Unknown)   There is no height or weight on file to calculate BMI.     Physical Exam   Gen: A&O, NAD  Eyes: Extra-ocular muscles grossly intact, pupils equal.  ENT: Speech intact, nasal passages open, no hearing impairment noted.  Neck: No thyromegaly. No mass. Trachea midline.  Heart: S1S2 RRR no m/r/g/c/l/t   Lungs: CTAB no chest wall deformity and asymmetry, no w/r/r  Abdomen: Soft NT, ND. No rebound, guarding or mass.   Psychiatric: Mood and affect appear normal.   Extremities: Warm, able to move all four extremities at will.

## 2021-11-05 ENCOUNTER — TELEPHONE (OUTPATIENT)
Dept: FAMILY MEDICINE | Facility: CLINIC | Age: 61
End: 2021-11-05

## 2021-11-05 VITALS
HEART RATE: 69 BPM | SYSTOLIC BLOOD PRESSURE: 117 MMHG | RESPIRATION RATE: 20 BRPM | TEMPERATURE: 98 F | DIASTOLIC BLOOD PRESSURE: 72 MMHG

## 2021-11-10 ENCOUNTER — OFFICE VISIT (OUTPATIENT)
Dept: ENDOCRINOLOGY | Facility: CLINIC | Age: 61
End: 2021-11-10
Attending: STUDENT IN AN ORGANIZED HEALTH CARE EDUCATION/TRAINING PROGRAM
Payer: COMMERCIAL

## 2021-11-10 VITALS
BODY MASS INDEX: 29.93 KG/M2 | WEIGHT: 168.9 LBS | HEIGHT: 63 IN | SYSTOLIC BLOOD PRESSURE: 123 MMHG | DIASTOLIC BLOOD PRESSURE: 82 MMHG | HEART RATE: 101 BPM

## 2021-11-10 DIAGNOSIS — E83.52 HYPERCALCEMIA: ICD-10-CM

## 2021-11-10 DIAGNOSIS — E21.3 HYPERPARATHYROIDISM (H): ICD-10-CM

## 2021-11-10 DIAGNOSIS — M81.0 OSTEOPOROSIS WITHOUT CURRENT PATHOLOGICAL FRACTURE, UNSPECIFIED OSTEOPOROSIS TYPE: ICD-10-CM

## 2021-11-10 DIAGNOSIS — E55.9 VITAMIN D DEFICIENCY: Primary | ICD-10-CM

## 2021-11-10 PROCEDURE — 99205 OFFICE O/P NEW HI 60 MIN: CPT | Performed by: STUDENT IN AN ORGANIZED HEALTH CARE EDUCATION/TRAINING PROGRAM

## 2021-11-10 ASSESSMENT — MIFFLIN-ST. JEOR: SCORE: 1300.26

## 2021-11-10 ASSESSMENT — PAIN SCALES - GENERAL: PAINLEVEL: NO PAIN (0)

## 2021-11-10 NOTE — PROGRESS NOTES
Endocrinology Clinic Visit 11/10/2021    NAME:  Cindy Espinoza  PCP:  LeninArleth  MRN:  [id number]  Reason for Consult:  Hyperparathyroidism   Requesting Provider:  Referred Self    Chief Complaint     Chief Complaint   Patient presents with     New Patient     hyperparathyroidism       History of Present Illness     Cindy Espinoza is a 61 year old female who is seen in clinic for management of hyperparathyroidism and osteoporosis     Her PMh is otherwise unremarkable and she is not on any chronic meds.     She has long hx of vitD deficiency dating back in our records to 2011 with normal calcium ( on the upper limit of normal) of 10.2 . She had elevated calcium since 2012 ranging between 10.3-10.6. she elevated PTH of 84,11,137 back in 2012 - 2015 in the setting of vit D defciency and mild hyperCa. On 10/2018 viit D corrected to 28 then 32 and calcium down to 9.5 but PTH was still at 98. Since 2018 calcium has been ranging 10.1-10.3 with PTH of 88-97. kast Vit D check 12/2020 was 33, Ca 10.2 and PTH 97. Most recent Ca 10.1 on 5/2021.  She has normal kidney function.    She had her first DXA 2012 with lowest T -3 at the lumbar spine. Fosamax 70 mg weekly was started [id number] switched to Reclast 10/2018 due to non-compliance (she reported difficulty remembering once a week pills). Most recent DXA 9/2018 showed a T score of -3.3 at the lumbar spine.    Calcium intake: Dietary: 1 glass of milk every day, no cheese, yogurt once a week, Supplements: No    Vitamin D intake: Supplements: vitamin D 2000 international unit(s) ( 2 pills ) daily. She has been on it for long time. She reported compliance , rarely forget taking it.    - Previous fractures: No  - Family history of fragility fracture in parent: No  - History of kidney stones: No  - History of kidney disease: No  - Family history of any calcium problems or kidney stones: No  - History of vitamin D deficiency: Yes: as above  - History of HCTZ use: No  - History  of Lithium use: No  - History of malabsorption (IBD, Celiac, gastric bypass ): No  - History of thyroid disease: No  - Weight history: no  - Current smoking: no  - Steroid Use: no  - Rheumatoid  arthritis: no  - Alcohol (3 or more units per day): no  - Other medications known to affect BMD: no  - Menstrual history: age at menopause: she does not remember maybe 45  - Estrogen use after menopause: no  - Tendency for falls: no  - Physical activity: no routine    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    Problem List     Patient Active Problem List   Diagnosis     Hypercalcemia     Menopause present     Fibromyalgia     Chronic tension headaches     Osteoporosis     TMJ (temporomandibular joint syndrome)     Abnormal Pap smear of cervix     Caregiver burden     Chronic bilateral low back pain with right-sided sciatica     Age-related osteoporosis without current pathological fracture     Hyperparathyroidism (H)     Dry eyes     Decreased sense of vibration     Facial droop     Mixed hyperlipidemia     Need for shingles vaccine        Medications     Current Outpatient Medications   Medication     acetaminophen (TYLENOL) 500 MG tablet     atovaquone-proguanil (MALARONE) 250-100 MG tablet     bimatoprost (LUMIGAN) 0.03 % ophthalmic solution     cyclobenzaprine (FLEXERIL) 5 MG tablet     fluticasone (FLONASE) 50 MCG/ACT nasal spray     latanoprost (XALATAN) 0.005 % ophthalmic solution     LUMIGAN 0.01 % SOLN ophthalmic solution     CHERI 128 5 % ophthalmic ointment     omega 3 1000 MG CAPS     polyethylene glycol (MIRALAX) 17 GM/Dose powder     psyllium (METAMUCIL/KONSYL) 58.6 % powder     RESTASIS 0.05 % ophthalmic emulsion     TRAVATAN Z 0.004 % ophthalmic solution     vitamin D3 (CHOLECALCIFEROL) 50 mcg (2000 units) tablet     vitamin D3 (CHOLECALCIFEROL) 50 mcg (2000 units) tablet     VYZULTA 0.024 % SOLN ophthalmic solution     capsaicin (ZOSTRIX) 0.025 % external  cream     No current facility-administered medications for this visit.        Allergies     Allergies   Allergen Reactions     Contrast Dye Itching and Rash     Iodine Rash       Medical / Surgical History     Past Medical History:   Diagnosis Date     Ear pain     Left     Vitamin D deficiency      Past Surgical History:   Procedure Laterality Date     LAPAROSCOPIC CHOLECYSTECTOMY  03/2019    [facility]       Social History     Social History     Socioeconomic History     Marital status: Single     Spouse name: Not on file     Number of children: 2     Years of education: Not on file     Highest education level: Not on file   Occupational History     Occupation: had a [employer] in [employer]     Comment: Lives with children   Tobacco Use     Smoking status: Never Smoker     Smokeless tobacco: Never Used   Substance and Sexual Activity     Alcohol use: No     Drug use: No     Sexual activity: Not Currently   Other Topics Concern     Parent/sibling w/ CABG, MI or angioplasty before 65F 55M? Not Asked   Social History Narrative    Lives with adult son who has advanced XP and requires 24 hour care. Has another son named Scarlet, helps with care of Osbaldo.      Social Determinants of Health     Financial Resource Strain: Low Risk      Difficulty of Paying Living Expenses: Not hard at all   Food Insecurity: No Food Insecurity     Worried About Running Out of Food in the Last Year: Never true     Ran Out of Food in the Last Year: Never true   Transportation Needs: No Transportation Needs     Lack of Transportation (Medical): No     Lack of Transportation (Non-Medical): No   Physical Activity: Not on file   Stress: No Stress Concern Present     Feeling of Stress : Not at all   Social Connections: Unknown     Frequency of Communication with Friends and Family: Once a week     Frequency of Social Gatherings with Friends and Family: Never     Attends Adventism Services: Never     Active Member of Clubs or Organizations: Not asked  "    Attends Club or Organization Meetings: Not asked     Marital Status: Not asked   Intimate Partner Violence: Not At Risk     Fear of Current or Ex-Partner: No     Emotionally Abused: No     Physically Abused: No     Sexually Abused: No   Housing Stability: Not on file       Family History     Family History   Problem Relation Age of Onset     Other - See Comments Son         xeroderma pigmentosum     Diabetes Son      Pulmonary Embolism Son        ROS   12 ROS completed , pertinent negative and postive in HPI    Physical Exam   /82   Pulse 101   Ht 1.6 m (5' 3\")   Wt 76.6 kg (168 lb 14.4 oz)   LMP  (LMP Unknown)   BMI 29.92 kg/m       General: Comfortable, no obvious distress, normal body habitus  Eyes: Sclera anicteric, moist conjunctiva  HENT: wearing a mask.  Neck: wearing ismael  CV: normal rate.   Resp:  good effort, no evidence of loud wheezing  Abdomen:  obese, non distended.   Skin: No rashes, lesions, or subcutaneous nodules on exposed skin.   Psych: Alert and oriented x 3. Appropriate affect, good insight  Extremities: No peripheral edema  Musculoskeletal: Appropriate muscle bulk and strength  Neuro: Moves all four extremities. No focal deficits on limited exam.    Labs/Imaging     Pertinent Labs were reviewed and updated in [facility] and reviewed.  Radiology Results were  reviewed and updated in EPIC and reviewed.    Summary of recent findings:     TSH   Date Value Ref Range Status   12/28/2020 1.37 0.40 - 4.00 mU/L Final   04/12/2017 1.12 0.40 - 4.00 mU/L Final   05/07/2015 0.93 0.40 - 4.00 mU/L Final   09/02/2011 0.82 0.4 - 5.0 mU/L Final       Creatinine   Date Value Ref Range Status   05/03/2021 0.63 0.52 - 1.04 mg/dL Final       No results found for: [id number], [id number], [id number]    Recent Labs   Lab Test 05/03/21  1709 12/28/20  1329 08/06/20  1649    140.4 137   POTASSIUM 3.9 3.6 3.9   CHLORIDE 106 100.8 106   CO2 27 25.2 26   ANIONGAP 4  --  5   GLC 95 103.8* 94   BUN 18 15.2 10 "   CR 0.63 0.6 0.54   AARON 10.1 10.2* 10.1       I personally reviewed the patient's outside records from [facility] EMR. Summary of pertinent findings in HPI.    Impression / Plan     1. Very mild Hypercalcemia.   2. Hyperparathyroidism  3. Hx of vitD deficiency  4. Severe osteoporosis vs osteomalacia  With elevated PTH despite correcting her vitD,  primary hyperparathyroidism is very likely. Familial Hypocalciuric Hypercalemia is on the differential given mild hypercalcemia and mild PTH elevation. Records of 24 hour urine collection of caclcium was normal back in 2012( ? Unsure if it was true 24 hour urine collection and declined repeating labs). There is no family history to suggest  the latter  diagnoses. PTH remained elevated despite having vitD>30 2018-12/2020 which makes secondary hyperparathyroidism and osteomalacia less likely.  I do not think that surgery for hyperparathyroidism is indicated for her at this point. No need for parathyroid imaging.   We will repeat labs, if vitamin D is above 30, planning for resumption of OP treatment. Options are yearly reclast vs anabolic agent with daily injections. With her hx of noncompliance I believe denosumab or daily teriparatide (w  elevated PTH) are not good options for her.     Plan:  - DXA scan  - calcium panel labs  - we discussed calcium intake as below  -continue vit D 4000 international unit(s) daily  - fall precaution  - encouraged strengthening exercises.     The latest NIH guidelines conclude that surgery for hyperparathyroidism is indicated in symptomatic patients, or in asymptomatic patients who meet any one of the following conditions:  Serum calcium concentration of 1.0 mg/dL (0.25 mmol/L) or more above the upper limit of normal   Creatinine clearance that is reduced to <60 mL/min   Bone density at the hip, lumbar spine, or distal radius that is more than 2.5 standard deviations below peak bone mass (T score <-2.5) and/or previous fragility fracture   Age  less than 50 years  Operative intervention can be delayed in patients over 50 years of age who are asymptomatic or minimally symptomatic and who have serum calcium concentrations <1.0 mg/dL (0.2 mmol/L) above the upper limit of normal, and in patients who are medically unfit for surgery.    As far as calcium intake, even in cases of hyperparathyroidism, it is recommended to keep up calcium intake to about 1000 mg daily, to prevent further increase in PTH and bone disease. I advised that to the patient. She will increase her calcium intake from diet rather than take supplements          Test and/or medications prescribed today:  Orders Placed This Encounter   Procedures     Dexa hip/pelvis/spine     Dexa Wrist/Heel     Calcium     Albumin level     Parathyroid Hormone Intact     Creatinine     25 Hydroxyvitamin D2 and D3       Addendum:  After reviewed further her chart: she was seen by Dr. Bernstein back in 2018. She saw Dr. Aguilar (Endocrine, [facility]) in 2012 and was diagnosed with primary hyperparathyroidism, and recommended parathyroidectomy due to osteoporosis. DXA scan then showed lowest T-score of -3.0 in the lumbar spine. Head and neck US then did not seem to localize any parathyroid adenoma. ptient was lost to f/up until 2015, when she saw Dr. Trent Bonilla (Endocrine). She was advised observation. NM parathyroid imaging 2018 negative.     Labs still with mild elevation of PTH, normal ca and good vitD level. Newest DXA with improvement in BMD, lowest T score of -2.8. As discussed above, pt lost follow up, last reclast 10/2018. 3 years off bisphosphonate after 5 years total of tretment ( 4 years of oral fosamax with poor compliance and 1 year of IV reclast). ?? We may consider this period as drug holiday. No change in plan above. Resuming IV reclast as discussed with patient in clinic, treatment plan ordered.    Follow up: 6 month      Ivet Herrera MD  Endocrinology, Diabetes and Metabolism  [facility]  [place]

## 2021-11-10 NOTE — LETTER
11/10/2021       RE: Cindy Espinoza  [address] [facility] [address]  [place] [id number]     Dear Colleague,    Thank you for referring your patient, Cindy Espinoza, to the [facility] ENDOCRINOLOG[facility] [place] at [facility]. Please see a copy of my visit note below.    Endocrinology Clinic Visit 11/10/2021    NAME:  Cindy Espinoza  PCP:  Arleth Phelps  MRN:  [id number]  Reason for Consult:  Hyperparathyroidism   Requesting Provider:  Referred Self    Chief Complaint     Chief Complaint   Patient presents with     New Patient     hyperparathyroidism       History of Present Illness     Cindy Espinoza is a 61 year old female who is seen in clinic for management of hyperparathyroidism and osteoporosis     Her PMh is otherwise unremarkable and she is not on any chronic meds.     She has long hx of vitD deficiency dating back in our records to 2011 with normal calcium ( on the upper limit of normal) of 10.2 . She had elevated calcium since 2012 ranging between 10.3-10.6. she elevated PTH of 84,11,137 back in 2012 - 2015 in the setting of vit D defciency and mild hyperCa. On 10/2018 viit D corrected to 28 then 32 and calcium down to 9.5 but PTH was still at 98. Since 2018 calcium has been ranging 10.1-10.3 with PTH of 88-97. [facility], Ca 10.2 and PTH 97. Most recent Ca 10.1 on 5/2021.  She has normal kidney function.    She had her first DXA 2012 with lowest T -3 at the lumbar spine. Fosamax 70 mg weekly was started [id number] switched to Reclast 10/2018 due to non-compliance (she reported difficulty remembering once a week pills). Most recent DXA 9/2018 showed a T score of -3.3 at the lumbar spine.    Calcium intake: Dietary: 1 glass of milk every day, no cheese, yogurt once a week, Supplements: No    Vitamin D intake: Supplements: vitamin D 2000 international unit(s) ( 2 pills ) daily. She has been on it for long time. She  reported compliance , rarely forget taking it.    - Previous fractures: No  - Family history of fragility fracture in parent: No  - History of kidney stones: No  - History of kidney disease: No  - Family history of any calcium problems or kidney stones: No  - History of vitamin D deficiency: Yes: as above  - History of HCTZ use: No  - History of Lithium use: No  - History of malabsorption (IBD, Celiac, gastric bypass ): No  - History of thyroid disease: No  - Weight history: no  - Current smoking: no  - Steroid Use: no  - Rheumatoid  arthritis: no  - Alcohol (3 or more units per day): no  - Other medications known to affect BMD: no  - Menstrual history: age at menopause: she does not remember maybe 45  - Estrogen use after menopause: no  - Tendency for falls: no  - Physical activity: no routine    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    Problem List     Patient Active Problem List   Diagnosis     Hypercalcemia     Menopause present     Fibromyalgia     Chronic tension headaches     Osteoporosis     TMJ (temporomandibular joint syndrome)     Abnormal Pap smear of cervix     Caregiver burden     Chronic bilateral low back pain with right-sided sciatica     Age-related osteoporosis without current pathological fracture     Hyperparathyroidism (H)     Dry eyes     Decreased sense of vibration     Facial droop     Mixed hyperlipidemia     Need for shingles vaccine        Medications     Current Outpatient Medications   Medication     acetaminophen (TYLENOL) 500 MG tablet     atovaquone-proguanil (MALARONE) 250-100 MG tablet     bimatoprost (LUMIGAN) 0.03 % ophthalmic solution     cyclobenzaprine (FLEXERIL) 5 MG tablet     fluticasone (FLONASE) 50 MCG/ACT nasal spray     latanoprost (XALATAN) 0.005 % ophthalmic solution     LUMIGAN 0.01 % SOLN ophthalmic solution     CHERI 128 5 % ophthalmic ointment     omega 3 1000 MG CAPS     polyethylene glycol (MIRALAX) 17 GM/Dose  powder     psyllium (METAMUCIL/KONSYL) 58.6 % powder     RESTASIS 0.05 % ophthalmic emulsion     TRAVATAN Z 0.004 % ophthalmic solution     vitamin D3 (CHOLECALCIFEROL) 50 mcg (2000 units) tablet     vitamin D3 (CHOLECALCIFEROL) 50 mcg (2000 units) tablet     VYZULTA 0.024 % SOLN ophthalmic solution     capsaicin (ZOSTRIX) 0.025 % external cream     No current facility-administered medications for this visit.        Allergies     Allergies   Allergen Reactions     Contrast Dye Itching and Rash     Iodine Rash       Medical / Surgical History     Past Medical History:   Diagnosis Date     Ear pain     Left     Vitamin D deficiency      Past Surgical History:   Procedure Laterality Date     LAPAROSCOPIC CHOLECYSTECTOMY  03/2019    [facility]       Social History     Social History     Socioeconomic History     Marital status: Single     Spouse name: Not on file     Number of children: 2     Years of education: Not on file     Highest education level: Not on file   Occupational History     Occupation: had a [employer] in [employer]     Comment: Lives with children   Tobacco Use     Smoking status: Never Smoker     Smokeless tobacco: Never Used   Substance and Sexual Activity     Alcohol use: No     Drug use: No     Sexual activity: Not Currently   Other Topics Concern     Parent/sibling w/ CABG, MI or angioplasty before 65F 55M? Not Asked   Social History Narrative    Lives with adult son who has advanced XP and requires 24 hour care. Has another son named Scarlet, helps with care of Osbaldo.      Social Determinants of Health     Financial Resource Strain: Low Risk      Difficulty of Paying Living Expenses: Not hard at all   Food Insecurity: No Food Insecurity     Worried About Running Out of Food in the Last Year: Never true     Ran Out of Food in the Last Year: Never true   Transportation Needs: No Transportation Needs     Lack of Transportation (Medical): No     Lack of Transportation (Non-Medical): No   Physical  "Activity: Not on file   Stress: No Stress Concern Present     Feeling of Stress : Not at all   Social Connections: Unknown     Frequency of Communication with Friends and Family: Once a week     Frequency of Social Gatherings with Friends and Family: Never     Attends Episcopal Services: Never     Active Member of Clubs or Organizations: Not asked     Attends Club or Organization Meetings: Not asked     Marital Status: Not asked   Intimate Partner Violence: Not At Risk     Fear of Current or Ex-Partner: No     Emotionally Abused: No     Physically Abused: No     Sexually Abused: No   Housing Stability: Not on file       Family History     Family History   Problem Relation Age of Onset     Other - See Comments Son         xeroderma pigmentosum     Diabetes Son      Pulmonary Embolism Son        ROS   12 ROS completed , pertinent negative and postive in HPI    Physical Exam   /82   Pulse 101   Ht 1.6 m (5' 3\")   Wt 76.6 kg (168 lb 14.4 oz)   LMP  (LMP Unknown)   BMI 29.92 kg/m       General: Comfortable, no obvious distress, normal body habitus  Eyes: Sclera anicteric, moist conjunctiva  HENT: wearing a mask.  Neck: wearing ismael  CV: normal rate.   Resp:  good effort, no evidence of loud wheezing  Abdomen:  obese, non distended.   Skin: No rashes, lesions, or subcutaneous nodules on exposed skin.   Psych: Alert and oriented x 3. Appropriate affect, good insight  Extremities: No peripheral edema  Musculoskeletal: Appropriate muscle bulk and strength  Neuro: Moves all four extremities. No focal deficits on limited exam.    Labs/Imaging     Pertinent Labs were reviewed and updated in [facility] and reviewed.  Radiology Results were  reviewed and updated in EPIC and reviewed.    Summary of recent findings:     TSH   Date Value Ref Range Status   12/28/2020 1.37 0.40 - 4.00 mU/L Final   04/12/2017 1.12 0.40 - 4.00 mU/L Final   05/07/2015 0.93 0.40 - 4.00 mU/L Final   09/02/2011 0.82 0.4 - 5.0 mU/L Final "       Creatinine   Date Value Ref Range Status   05/03/2021 0.63 0.52 - 1.04 mg/dL Final       No results found for: [id number], [id number], [id number]    Recent Labs   Lab Test 05/03/21  1709 12/28/20  1329 08/06/20  1649    140.4 137   POTASSIUM 3.9 3.6 3.9   CHLORIDE 106 100.8 106   CO2 27 25.2 26   ANIONGAP 4  --  5   GLC 95 103.8* 94   BUN 18 15.2 10   CR 0.63 0.6 0.54   AARON 10.1 10.2* 10.1       I personally reviewed the patient's outside records from [facility] EMR. Summary of pertinent findings in HPI.    Impression / Plan     1. Very mild Hypercalcemia.   2. Hyperparathyroidism  3. Hx of vitD deficiency  4. Severe osteoporosis vs osteomalacia  With elevated PTH despite correcting her vitD,  primary hyperparathyroidism is very likely. Familial Hypocalciuric Hypercalemia is on the differential given mild hypercalcemia and mild PTH elevation. Records of 24 hour urine collection of caclcium was normal back in 2012( ? Unsure if it was true 24 hour urine collection and declined repeating labs). There is no family history to suggest  the latter  diagnoses. PTH remained elevated despite having vitD>30 2018-12/2020 which makes secondary hyperparathyroidism and osteomalacia less likely.  I do not think that surgery for hyperparathyroidism is indicated for her at this point. No need for parathyroid imaging.   We will repeat labs, if vitamin D is above 30, planning for resumption of OP treatment. Options are yearly reclast vs anabolic agent with daily injections. With her hx of noncompliance I believe denosumab or daily teriparatide (w  elevated PTH) are not good options for her.     Plan:  - DXA scan  - calcium panel labs  - we discussed calcium intake as below  -continue vit D 4000 international unit(s) daily  - fall precaution  - encouraged strengthening exercises.     The latest NIH guidelines conclude that surgery for hyperparathyroidism is indicated in symptomatic patients, or in asymptomatic patients who meet  any one of the following conditions:  Serum calcium concentration of 1.0 mg/dL (0.25 mmol/L) or more above the upper limit of normal   Creatinine clearance that is reduced to <60 mL/min   Bone density at the hip, lumbar spine, or distal radius that is more than 2.5 standard deviations below peak bone mass (T score <-2.5) and/or previous fragility fracture   Age less than 50 years  Operative intervention can be delayed in patients over 50 years of age who are asymptomatic or minimally symptomatic and who have serum calcium concentrations <1.0 mg/dL (0.2 mmol/L) above the upper limit of normal, and in patients who are medically unfit for surgery.    As far as calcium intake, even in cases of hyperparathyroidism, it is recommended to keep up calcium intake to about 1000 mg daily, to prevent further increase in PTH and bone disease. I advised that to the patient. She will increase her calcium intake from diet rather than take supplements          Test and/or medications prescribed today:  Orders Placed This Encounter   Procedures     Dexa hip/pelvis/spine     Dexa Wrist/Heel     Calcium     Albumin level     Parathyroid Hormone Intact     Creatinine     25 Hydroxyvitamin D2 and D3       Addendum:  After reviewed further her chart: she was seen by Dr. Bernstein back in 2018. She saw Dr. Aguilar (Endocrine, [facility]) in 2012 and was diagnosed with primary hyperparathyroidism, and recommended parathyroidectomy due to osteoporosis. DXA scan then showed lowest T-score of -3.0 in the lumbar spine. Head and neck US then did not seem to localize any parathyroid adenoma. ptient was lost to f/up until 2015, when she saw Dr. Trent Bonilla (Endocrine). She was advised observation. NM parathyroid imaging 2018 negative.     Labs still with mild elevation of PTH, normal ca and good vitD level. Newest DXA with improvement in BMD, lowest T score of -2.8. As discussed above, pt lost follow up, last reclast 10/2018. 3 years off  bisphosphonate after 5 years total of tretment ( 4 years of oral fosamax with poor compliance and 1 year of IV reclast). ?? We may consider this period as drug holiday. No change in plan above. Resuming IV reclast as discussed with patient in clinic, treatment plan ordered.    Follow up: 6 month      Ivet Herrera MD  Endocrinology, Diabetes and Metabolism  [facility]

## 2021-11-10 NOTE — PATIENT INSTRUCTIONS
It was nice meeting you!    We will get blood draw today for calcium panel    Please schedule an appointment for DXA scan.    Return to clinic in 6 month

## 2021-11-23 ENCOUNTER — ANCILLARY PROCEDURE (OUTPATIENT)
Dept: BONE DENSITY | Facility: CLINIC | Age: 61
End: 2021-11-23
Attending: STUDENT IN AN ORGANIZED HEALTH CARE EDUCATION/TRAINING PROGRAM
Payer: COMMERCIAL

## 2021-11-23 ENCOUNTER — LAB (OUTPATIENT)
Dept: LAB | Facility: CLINIC | Age: 61
End: 2021-11-23
Attending: STUDENT IN AN ORGANIZED HEALTH CARE EDUCATION/TRAINING PROGRAM
Payer: COMMERCIAL

## 2021-11-23 DIAGNOSIS — M81.0 OSTEOPOROSIS WITHOUT CURRENT PATHOLOGICAL FRACTURE, UNSPECIFIED OSTEOPOROSIS TYPE: ICD-10-CM

## 2021-11-23 DIAGNOSIS — E21.3 HYPERPARATHYROIDISM (H): ICD-10-CM

## 2021-11-23 LAB
ALBUMIN SERPL-MCNC: 3.4 G/DL (ref 3.4–5)
CALCIUM SERPL-MCNC: 10 MG/DL (ref 8.5–10.1)
CREAT SERPL-MCNC: 0.56 MG/DL (ref 0.52–1.04)
GFR SERPL CREATININE-BSD FRML MDRD: >90 ML/MIN/1.73M2

## 2021-11-23 PROCEDURE — 83970 ASSAY OF PARATHORMONE: CPT | Mod: 90 | Performed by: PATHOLOGY

## 2021-11-23 PROCEDURE — 82565 ASSAY OF CREATININE: CPT | Performed by: PATHOLOGY

## 2021-11-23 PROCEDURE — 82310 ASSAY OF CALCIUM: CPT | Performed by: PATHOLOGY

## 2021-11-23 PROCEDURE — 36415 COLL VENOUS BLD VENIPUNCTURE: CPT | Performed by: PATHOLOGY

## 2021-11-23 PROCEDURE — 82306 VITAMIN D 25 HYDROXY: CPT | Mod: 90 | Performed by: PATHOLOGY

## 2021-11-23 PROCEDURE — 77081 DXA BONE DENSITY APPENDICULR: CPT | Mod: XU | Performed by: INTERNAL MEDICINE

## 2021-11-23 PROCEDURE — 82040 ASSAY OF SERUM ALBUMIN: CPT | Performed by: PATHOLOGY

## 2021-11-23 PROCEDURE — 77080 DXA BONE DENSITY AXIAL: CPT | Performed by: INTERNAL MEDICINE

## 2021-11-24 LAB
DEPRECATED CALCIDIOL+CALCIFEROL SERPL-MC: 60 UG/L (ref 20–75)
PTH-INTACT SERPL-MCNC: 86 PG/ML (ref 18–80)

## 2021-11-26 DIAGNOSIS — E21.3 HYPERPARATHYROIDISM (H): Primary | ICD-10-CM

## 2021-11-26 DIAGNOSIS — M81.0 AGE-RELATED OSTEOPOROSIS WITHOUT CURRENT PATHOLOGICAL FRACTURE: ICD-10-CM

## 2021-11-26 RX ORDER — ALBUTEROL SULFATE 0.83 MG/ML
2.5 SOLUTION RESPIRATORY (INHALATION)
Status: CANCELLED | OUTPATIENT
Start: 2021-11-29

## 2021-11-26 RX ORDER — ALBUTEROL SULFATE 90 UG/1
1-2 AEROSOL, METERED RESPIRATORY (INHALATION)
Status: CANCELLED
Start: 2021-11-29

## 2021-11-26 RX ORDER — HEPARIN SODIUM (PORCINE) LOCK FLUSH IV SOLN 100 UNIT/ML 100 UNIT/ML
5 SOLUTION INTRAVENOUS
Status: CANCELLED | OUTPATIENT
Start: 2021-11-29

## 2021-11-26 RX ORDER — MEPERIDINE HYDROCHLORIDE 25 MG/ML
25 INJECTION INTRAMUSCULAR; INTRAVENOUS; SUBCUTANEOUS EVERY 30 MIN PRN
Status: CANCELLED | OUTPATIENT
Start: 2021-11-29

## 2021-11-26 RX ORDER — DIPHENHYDRAMINE HYDROCHLORIDE 50 MG/ML
50 INJECTION INTRAMUSCULAR; INTRAVENOUS
Status: CANCELLED
Start: 2021-11-29

## 2021-11-26 RX ORDER — HEPARIN SODIUM,PORCINE 10 UNIT/ML
5 VIAL (ML) INTRAVENOUS
Status: CANCELLED | OUTPATIENT
Start: 2021-11-29

## 2021-11-26 RX ORDER — ZOLEDRONIC ACID 5 MG/100ML
5 INJECTION, SOLUTION INTRAVENOUS ONCE
Status: CANCELLED
Start: 2021-11-29 | End: 2021-11-29

## 2021-11-26 RX ORDER — EPINEPHRINE 1 MG/ML
0.3 INJECTION, SOLUTION, CONCENTRATE INTRAVENOUS EVERY 5 MIN PRN
Status: CANCELLED | OUTPATIENT
Start: 2021-11-29

## 2021-11-26 RX ORDER — METHYLPREDNISOLONE SODIUM SUCCINATE 125 MG/2ML
125 INJECTION, POWDER, LYOPHILIZED, FOR SOLUTION INTRAMUSCULAR; INTRAVENOUS
Status: CANCELLED
Start: 2021-11-29

## 2021-11-26 RX ORDER — NALOXONE HYDROCHLORIDE 0.4 MG/ML
0.2 INJECTION, SOLUTION INTRAMUSCULAR; INTRAVENOUS; SUBCUTANEOUS
Status: CANCELLED | OUTPATIENT
Start: 2021-11-29

## 2021-11-29 ENCOUNTER — TELEPHONE (OUTPATIENT)
Dept: ENDOCRINOLOGY | Facility: CLINIC | Age: 61
End: 2021-11-29
Payer: COMMERCIAL

## 2021-11-29 DIAGNOSIS — M81.0 AGE-RELATED OSTEOPOROSIS WITHOUT CURRENT PATHOLOGICAL FRACTURE: Primary | ICD-10-CM

## 2021-11-29 LAB
DEPRECATED CALCIDIOL+CALCIFEROL SERPL-MC: <70 UG/L (ref 20–75)
VITAMIN D2 SERPL-MCNC: <5 UG/L
VITAMIN D3 SERPL-MCNC: 65 UG/L

## 2021-11-29 NOTE — TELEPHONE ENCOUNTER
----- Message from Ivet Herrera MD sent at 11/29/2021  1:44 PM CST -----  Hello,    I ordered IV reclast on this patient , I discussed the plan with her in clinic. Given the language barrier do you mind following up on that. I do not see it scheduled yet.      Thank you,  Ivet Herrera MD  Endocrinology, Diabetes and Metabolism  [facility]

## 2021-11-29 NOTE — TELEPHONE ENCOUNTER
Clinic coordinators notified to contact patient to schedule Reclast. COvid PCR test entered with approximate date to do before the Reclast . Nisha Laurent RN on 11/29/2021 at 2:21 PM

## 2021-12-02 ENCOUNTER — ALLIED HEALTH/NURSE VISIT (OUTPATIENT)
Dept: FAMILY MEDICINE | Facility: CLINIC | Age: 61
End: 2021-12-02
Payer: COMMERCIAL

## 2021-12-02 ENCOUNTER — TELEPHONE (OUTPATIENT)
Dept: ENDOCRINOLOGY | Facility: CLINIC | Age: 61
End: 2021-12-02

## 2021-12-02 VITALS
WEIGHT: 165.7 LBS | DIASTOLIC BLOOD PRESSURE: 84 MMHG | HEIGHT: 64 IN | BODY MASS INDEX: 28.29 KG/M2 | HEART RATE: 76 BPM | TEMPERATURE: 97.6 F | SYSTOLIC BLOOD PRESSURE: 126 MMHG

## 2021-12-02 DIAGNOSIS — E21.3 HYPERPARATHYROIDISM (H): Primary | ICD-10-CM

## 2021-12-02 DIAGNOSIS — M81.0 AGE-RELATED OSTEOPOROSIS WITHOUT CURRENT PATHOLOGICAL FRACTURE: ICD-10-CM

## 2021-12-02 DIAGNOSIS — N64.4 BREAST PAIN: ICD-10-CM

## 2021-12-02 PROCEDURE — 99213 OFFICE O/P EST LOW 20 MIN: CPT | Mod: GC | Performed by: STUDENT IN AN ORGANIZED HEALTH CARE EDUCATION/TRAINING PROGRAM

## 2021-12-02 ASSESSMENT — MIFFLIN-ST. JEOR: SCORE: 1295.26

## 2021-12-02 NOTE — PATIENT INSTRUCTIONS
Patient Education   Here is the plan from today's visit    1. Hyperparathyroidism (H)  2. Age-related osteoporosis without current pathological fracture  Schedule the yearly reclast infusions  You do not need to take a Calcium supplementation but we recommend that your include 1000mg Calcium in your diet (calcium rich foods shown below)    Breast Pain  My exam is reassuring  Schedule a mammogram for April 2022 and talk to your doctor sooner if you experience puckering of the skin, feel a lump, or have nipple discharge.      Please call or return to clinic if your symptoms don't go away.    Follow up plan  No follow-ups on file.    Thank you for coming to [facility] today.  Lab Testing:  **If you had lab testing today and your results are reassuring or normal they will be mailed to you or sent through [employer] within 7 days.   **If the lab tests need quick action we will call you with the results.  **If you are having labs done on a different day, please call [phone] to schedule at [facility] or [phone] for other [facility] locations. Labs do not offer walk-in appointments.  The phone number we will call with results is # [phone] (home) . If this is not the best number please call our clinic and change the number.  Medication Refills:  If you need any refills please call your pharmacy and they will contact us.   If you need to  your refill at a new pharmacy, please contact the new pharmacy directly. The new pharmacy will help you get your medications transferred faster.   Scheduling:  If you have any concerns about today's visit or wish to schedule another appointment please call our office during normal business hours [phone] (8-5:00 M-F)  If a referral was made to a [facility] and you don't get a call from central scheduling please call [phone].  If a Mammogram was ordered for you at [facility] call [phone] to schedule or change  your appointment.  If you had an EKG/XRay/CT/Ultrasound/MRI ordered the number is [phone] to schedule or change your radiology appointment.   [facility] has limited ultrasound appointments available on Wednesdays, if you would like your ultrasound at [facility], please call [phone] to schedule.   Medical Concerns:  If you have urgent medical concerns please call [phone] at any time of the day.    Dafne Arechiga, DO           Patient Education     Preventing Osteoporosis: Meeting Your Calcium Needs    Your body needs calcium to build and repair bones. But it can't make calcium on its own. That's why it's important to eat calcium-rich foods. Some foods are naturally rich in calcium. Others have calcium added (fortified). It's best to get calcium from the foods you eat. But if you can't get enough, you may want to take calcium supplements. To meet your daily calcium needs, try the foods listed below.  Dairy Fish & beans Other sources   Source   Calcium (mg) per serving   Source   Calcium (mg) per serving   Source   Calcium (mg) per serving   Low-fat yogurt, plain   415 mg/8 oz.   Sardines, [place], canned, with bones   351 mg/3 oz.   Oatmeal, instant, fortified   215 mg/1 cup   Nonfat milk   302 mg/1 cup   [place], sockeye, canned, with bones   239 mg/3 oz.   Tofu made with calcium sulfate   204 mg/3 oz.   Low-fat milk   297 mg/1 cup   Soybeans, fresh, boiled   131 mg/1/2 cup   Collards   179 mg/1/2 cup   Swiss cheese   272 mg/1 oz.   White beans, cooked   81 mg/1/2 cup   English muffin, whole wheat   175 mg/1 muffin   Cheddar cheese   205 mg/1 oz.   Navy beans, cooked   79 mg/1/2 cup   Kale   90 mg/1/2 cup   Ice cream strawberry   79 mg/1/2 cup           Orange, navel   56 mg/1 medium   Note: Calcium levels may vary depending on brand and size.  Daily calcium needs  14 to 18 years old: 1,300 mg  19 to 30 years old: 1,000 mg  31 to 50 years old: 1,000 mg  51 to 70 years old, women: 1,200  mg  51 to 70 years old, men: 1,000 mg  Pregnant or nursin to 18 years old: 1,300 mg, 19 to 50 years old: 1,000 mg  Older than 70 (women and men): 1,200 mg   Pablo last reviewed this educational content on 2018-2021 [employer]. All rights reserved. This information is not intended as a substitute for professional medical care. Always follow your healthcare professional's instructions.

## 2021-12-02 NOTE — TELEPHONE ENCOUNTER
----- Message from Nisha Laurent RN sent at 11/29/2021  2:17 PM CST -----  Regarding: Reclast  Please contact  patient to set up Reclast appointment  in infusion center . Nisha CASTANO     Improved

## 2021-12-02 NOTE — PROGRESS NOTES
Assessment & Plan     Hyperparathyroidism (H)  Age-related osteoporosis without current pathological fracture  Was previously prescribed Fosamax (as long ago as ) but was inconsistent about taking it. Switched to Reclast in the past but lost to follow up. Recently returned to see endocrinology;   DEXA -2.8, PTH elevated, Ca and Vit D within normal limits. Per endocrine note, no parathyroid imaging or surgery indicated; will resume yearly Reclast infusions. She was called this morning but did not schedule this. We reviewed all of her imaging and lab results together; discussed osteoporosis and the reason for good bone health.  - discussed the importance of Reclast Infusions and RN to help schedule  - reviewed Ca recommendations and dietary calcium; no supplementation indicated at this time    Breast pain  Patient's chart shows normal mammograms in , , ,  but she denies ever having these done. She is worried that someone else's images were entered inappropriately into her chart. We discussed that this is possible but very unlikely given the 4 episodes, all confirmed with name and .  Patient reports bilateral breast pain without discharge. Denies family history of breast, uterine, ovarian cancer. However, offered breast exam today to ease patient's concern; exam significant for dense breast tissue with mild tenderness to palpation bilaterally but negative for nipple discharge, skin puckering, lumps felt  - RN to assist in scheduling mammogram 2022.     No follow-ups on file.    DO HARLEY Shaffer [facility]  [facility]   Cindy is a 61 year old who presents for the following health issues    HPI   Recently seen by Dr. Herrera with endocrine for Hyperparathyroidism and Osteoporosis. Surgery not indicated and no parathyroid imaging. Determined that if Vit D > 30 (resulted at 60), will resume OP treatment (restart yearly Reclast)- was called this morning to schedule  "first infusion    Mammogram: patient reports that there is a mammogram in her chart 2019, 2021 but she never had a mammogram. Is very upset that her chart does not align with her experience. Discussed risk level of breast cancer for her and offered breast exam today.       Review of Systems   Constitutional, HEENT, cardiovascular, pulmonary, gi and gu systems are negative, except as otherwise noted.      Objective    /84   Pulse 76   Temp 97.6  F (36.4  C)   Ht 1.615 m (5' 3.6\")   Wt 75.2 kg (165 lb 11.2 oz)   LMP  (LMP Unknown)   BMI 28.80 kg/m    Body mass index is 28.8 kg/m .  Physical Exam   GENERAL: healthy, alert and no distress  EYES: Eyes grossly normal to inspection, PERRL and conjunctivae and sclerae normal  NECK: no adenopathy, no asymmetry, masses, or scars and thyroid normal to palpation  RESP: lungs clear to auscultation - no rales, rhonchi or wheezes  BREAST: normal without masses, tenderness or nipple discharge and no palpable axillary masses or adenopathy, no skin puckering, dimpling, or color change  CV: regular rate and rhythm, normal S1 S2  SKIN: no suspicious lesions or rashes on visible skin  PSYCH: mentation appears normal, affect normal/bright  "

## 2021-12-03 NOTE — PROGRESS NOTES
Preceptor Attestation:  I discussed the patient with the resident. I have verified the content of the note, which accurately reflects my assessment of the patient and the plan of care.   Supervising Physician:  Sherri Dimas DO

## 2021-12-07 ENCOUNTER — TELEPHONE (OUTPATIENT)
Dept: FAMILY MEDICINE | Facility: CLINIC | Age: 61
End: 2021-12-07
Payer: COMMERCIAL

## 2021-12-07 DIAGNOSIS — Z20.822 ENCOUNTER FOR LABORATORY TESTING FOR COVID-19 VIRUS: Primary | ICD-10-CM

## 2021-12-07 NOTE — CONFIDENTIAL NOTE
Patient made an appointment for the infusion (Reclast ),on January 11,2022 at 12;30 pm .     Ama Rahman RN

## 2021-12-09 NOTE — PROGRESS NOTES
Discharge Note    Progress reporting period is from initial evaluation date (please see noted date below) to Apr 29, 2021.  No linked episodes      Cindy failed to follow up and current status is unknown.  Please see information below for last relevant information on current status.  Patient seen for 2 visits.    SUBJECTIVE  Subjective changes noted by patient:  No change since last visit   .  Current pain level is  .     Previous pain level was   .   Changes in function:  Yes (See Goal flowsheet attached for changes in current functional level)  Adverse reaction to treatment or activity: None    OBJECTIVE  Changes noted in objective findings: Reduced L jaw mobility in opening. Able to open normally with effort and mirror for feedback     ASSESSMENT/PLAN  Diagnosis: TMD   Updated problem list and treatment plan:   Pain - HEP  Decreased ROM/flexibility - HEP  Decreased function - HEP  Decreased strength - HEP  STG/LTGs have been met or progress has been made towards goals:  Yes, please see goal flowsheet for most current information  Assessment of Progress: current status is unknown.    Last current status:     Self Management Plans:  HEP  I have re-evaluated this patient and find that the nature, scope, duration and intensity of the therapy is appropriate for the medical condition of the patient.  Cindy continues to require the following intervention to meet STG and LTG's:  HEP.    Recommendations:  Discharge with current home program.  Patient to follow up with MD as needed.    Please refer to the daily flowsheet for treatment today, total treatment time and time spent performing 1:1 timed codes.

## 2022-01-07 ENCOUNTER — LAB (OUTPATIENT)
Dept: LAB | Facility: CLINIC | Age: 62
End: 2022-01-07
Attending: INTERNAL MEDICINE
Payer: COMMERCIAL

## 2022-01-07 DIAGNOSIS — Z20.822 ENCOUNTER FOR LABORATORY TESTING FOR COVID-19 VIRUS: ICD-10-CM

## 2022-01-07 LAB — SARS-COV-2 RNA RESP QL NAA+PROBE: NEGATIVE

## 2022-01-07 PROCEDURE — U0003 INFECTIOUS AGENT DETECTION BY NUCLEIC ACID (DNA OR RNA); SEVERE ACUTE RESPIRATORY SYNDROME CORONAVIRUS 2 (SARS-COV-2) (CORONAVIRUS DISEASE [COVID-19]), AMPLIFIED PROBE TECHNIQUE, MAKING USE OF HIGH THROUGHPUT TECHNOLOGIES AS DESCRIBED BY CMS-2020-01-R: HCPCS | Mod: 90 | Performed by: PATHOLOGY

## 2022-01-07 PROCEDURE — U0005 INFEC AGEN DETEC AMPLI PROBE: HCPCS | Mod: 90 | Performed by: PATHOLOGY

## 2022-01-10 RX ORDER — EPINEPHRINE 1 MG/ML
0.3 INJECTION, SOLUTION INTRAMUSCULAR; SUBCUTANEOUS EVERY 5 MIN PRN
Status: CANCELLED | OUTPATIENT
Start: 2022-01-10

## 2022-01-10 RX ORDER — METHYLPREDNISOLONE SODIUM SUCCINATE 125 MG/2ML
125 INJECTION, POWDER, LYOPHILIZED, FOR SOLUTION INTRAMUSCULAR; INTRAVENOUS
Status: CANCELLED
Start: 2022-01-10

## 2022-01-10 RX ORDER — HEPARIN SODIUM,PORCINE 10 UNIT/ML
5 VIAL (ML) INTRAVENOUS
Status: CANCELLED | OUTPATIENT
Start: 2022-01-10

## 2022-01-10 RX ORDER — NALOXONE HYDROCHLORIDE 0.4 MG/ML
0.2 INJECTION, SOLUTION INTRAMUSCULAR; INTRAVENOUS; SUBCUTANEOUS
Status: CANCELLED | OUTPATIENT
Start: 2022-01-10

## 2022-01-10 RX ORDER — MEPERIDINE HYDROCHLORIDE 25 MG/ML
25 INJECTION INTRAMUSCULAR; INTRAVENOUS; SUBCUTANEOUS EVERY 30 MIN PRN
Status: CANCELLED | OUTPATIENT
Start: 2022-01-10

## 2022-01-10 RX ORDER — ALBUTEROL SULFATE 0.83 MG/ML
2.5 SOLUTION RESPIRATORY (INHALATION)
Status: CANCELLED | OUTPATIENT
Start: 2022-01-10

## 2022-01-10 RX ORDER — ZOLEDRONIC ACID 5 MG/100ML
5 INJECTION, SOLUTION INTRAVENOUS ONCE
Status: CANCELLED
Start: 2022-01-10 | End: 2022-01-10

## 2022-01-10 RX ORDER — DIPHENHYDRAMINE HYDROCHLORIDE 50 MG/ML
50 INJECTION INTRAMUSCULAR; INTRAVENOUS
Status: CANCELLED
Start: 2022-01-10

## 2022-01-10 RX ORDER — HEPARIN SODIUM (PORCINE) LOCK FLUSH IV SOLN 100 UNIT/ML 100 UNIT/ML
5 SOLUTION INTRAVENOUS
Status: CANCELLED | OUTPATIENT
Start: 2022-01-10

## 2022-01-10 RX ORDER — ALBUTEROL SULFATE 90 UG/1
1-2 AEROSOL, METERED RESPIRATORY (INHALATION)
Status: CANCELLED
Start: 2022-01-10

## 2022-01-11 ENCOUNTER — INFUSION THERAPY VISIT (OUTPATIENT)
Dept: INFUSION THERAPY | Facility: CLINIC | Age: 62
End: 2022-01-11
Attending: INTERNAL MEDICINE
Payer: COMMERCIAL

## 2022-01-11 ENCOUNTER — APPOINTMENT (OUTPATIENT)
Dept: LAB | Facility: CLINIC | Age: 62
End: 2022-01-11
Attending: STUDENT IN AN ORGANIZED HEALTH CARE EDUCATION/TRAINING PROGRAM
Payer: COMMERCIAL

## 2022-01-11 VITALS
BODY MASS INDEX: 28.51 KG/M2 | SYSTOLIC BLOOD PRESSURE: 110 MMHG | RESPIRATION RATE: 16 BRPM | WEIGHT: 164 LBS | TEMPERATURE: 97.1 F | HEART RATE: 69 BPM | OXYGEN SATURATION: 96 % | DIASTOLIC BLOOD PRESSURE: 72 MMHG

## 2022-01-11 DIAGNOSIS — M81.0 AGE-RELATED OSTEOPOROSIS WITHOUT CURRENT PATHOLOGICAL FRACTURE: ICD-10-CM

## 2022-01-11 DIAGNOSIS — E21.3 HYPERPARATHYROIDISM (H): Primary | ICD-10-CM

## 2022-01-11 LAB
CALCIUM SERPL-MCNC: 10.1 MG/DL (ref 8.5–10.1)
CREAT SERPL-MCNC: 0.49 MG/DL (ref 0.52–1.04)
GFR SERPL CREATININE-BSD FRML MDRD: >90 ML/MIN/1.73M2

## 2022-01-11 PROCEDURE — 36415 COLL VENOUS BLD VENIPUNCTURE: CPT | Performed by: STUDENT IN AN ORGANIZED HEALTH CARE EDUCATION/TRAINING PROGRAM

## 2022-01-11 PROCEDURE — 250N000011 HC RX IP 250 OP 636: Performed by: STUDENT IN AN ORGANIZED HEALTH CARE EDUCATION/TRAINING PROGRAM

## 2022-01-11 PROCEDURE — 82310 ASSAY OF CALCIUM: CPT | Performed by: STUDENT IN AN ORGANIZED HEALTH CARE EDUCATION/TRAINING PROGRAM

## 2022-01-11 PROCEDURE — 82565 ASSAY OF CREATININE: CPT | Performed by: STUDENT IN AN ORGANIZED HEALTH CARE EDUCATION/TRAINING PROGRAM

## 2022-01-11 PROCEDURE — 96365 THER/PROPH/DIAG IV INF INIT: CPT

## 2022-01-11 RX ORDER — ZOLEDRONIC ACID 5 MG/100ML
5 INJECTION, SOLUTION INTRAVENOUS ONCE
Status: COMPLETED | OUTPATIENT
Start: 2022-01-11 | End: 2022-01-11

## 2022-01-11 RX ADMIN — ZOLEDRONIC ACID 5 MG: 0.05 INJECTION, SOLUTION INTRAVENOUS at 13:38

## 2022-01-11 ASSESSMENT — PAIN SCALES - GENERAL: PAINLEVEL: NO PAIN (0)

## 2022-01-11 NOTE — PROGRESS NOTES
Infusion Nursing Note:  Cindy Espinoza presents today for reclast.    Patient seen by provider today: No   present during visit today: Yes Citizen of [place] via phone    Note: infused over approximately 15 minutes.    Intravenous Access:  Peripheral IV placed in second floor lab prior to [facility]    Treatment Conditions:  Calcium and Creatinine clearance within order parameters to infuse today.    Post Infusion Assessment:  Patient tolerated infusion without incident.  Blood return noted pre and post infusion.  Site patent and intact, free from redness, edema or discomfort.  No evidence of extravasations.  Access discontinued per protocol.     Discharge Plan:   Discharge instructions reviewed with: Patient.  Patient and/or family verbalized understanding of discharge instructions and all questions answered.  Copy of AVS reviewed with patient and/or family.  Patient discharged in stable condition accompanied by: self.  Departure Mode: Ambulatory.    Administrations This Visit     zoledronic Acid (RECLAST) infusion 5 mg     Admin Date  01/11/2022 Action  New Bag Dose  5 mg Rate  400 mL/hr Route  Intravenous Administered By  Carly Caruso, RN                  Carly Caruso RN

## 2022-01-11 NOTE — PATIENT INSTRUCTIONS
Dear Cindy Espinoza    Thank you for choosing [facility] for your infusion.  The following information is a summary of our appointment as well as important reminders.      Patient Education     Patient Education    Zoledronic Acid Solution for injection    Zoledronic Acid Solution for injection [Hypercalcemia of Malignancy]    Zoledronic Acid Solution for injection [Pagets Disease]  Zoledronic Acid Solution for injection  What is this medicine?  ZOLEDRONIC ACID (BENNETT le dron ik AS id) lowers the amount of calcium loss from bone. It is used to treat Paget's disease and osteoporosis in women.  This medicine may be used for other purposes; ask your health care provider or pharmacist if you have questions.  What should I tell my health care provider before I take this medicine?  They need to know if you have any of these conditions:    aspirin-sensitive asthma    cancer, especially if you are receiving medicines used to treat cancer    dental disease or wear dentures    infection    kidney disease    low levels of calcium in the blood    past surgery on the parathyroid gland or intestines    receiving corticosteroids like dexamethasone or prednisone    an unusual or allergic reaction to zoledronic acid, other medicines, foods, dyes, or preservatives    pregnant or trying to get pregnant    breast-feeding  How should I use this medicine?  This medicine is for infusion into a vein. It is given by a health care professional in a hospital or clinic setting.  Talk to your pediatrician regarding the use of this medicine in children. This medicine is not approved for use in children.  Overdosage: If you think you have taken too much of this medicine contact a poison control center or emergency room at once.  NOTE: This medicine is only for you. Do not share this medicine with others.  What if I miss a dose?  It is important not to miss your dose. Call your  doctor or health care professional if you are unable to keep an appointment.  What may interact with this medicine?    certain antibiotics given by injection    NSAIDs, medicines for pain and inflammation, like ibuprofen or naproxen    some diuretics like bumetanide, furosemide    teriparatide  This list may not describe all possible interactions. Give your health care provider a list of all the medicines, herbs, non-prescription drugs, or dietary supplements you use. Also tell them if you smoke, drink alcohol, or use illegal drugs. Some items may interact with your medicine.  What should I watch for while using this medicine?  Visit your doctor or health care professional for regular checkups. It may be some time before you see the benefit from this medicine. Do not stop taking your medicine unless your doctor tells you to. Your doctor may order blood tests or other tests to see how you are doing.  Women should inform their doctor if they wish to become pregnant or think they might be pregnant. There is a potential for serious side effects to an unborn child. Talk to your health care professional or pharmacist for more information.  You should make sure that you get enough calcium and vitamin D while you are taking this medicine. Discuss the foods you eat and the vitamins you take with your health care professional.  Some people who take this medicine have severe bone, joint, and/or muscle pain. This medicine may also increase your risk for jaw problems or a broken thigh bone. Tell your doctor right away if you have severe pain in your jaw, bones, joints, or muscles. Tell your doctor if you have any pain that does not go away or that gets worse.  Tell your dentist and dental surgeon that you are taking this medicine. You should not have major dental surgery while on this medicine. See your dentist to have a dental exam and fix any dental problems before starting this medicine. Take good care of your teeth while on  this medicine. Make sure you see your dentist for regular follow-up appointments.  What side effects may I notice from receiving this medicine?  Side effects that you should report to your doctor or health care professional as soon as possible:    allergic reactions like skin rash, itching or hives, swelling of the face, lips, or tongue    anxiety, confusion, or depression    breathing problems    changes in vision    eye pain    feeling faint or lightheaded, falls    jaw pain, especially after dental work    mouth sores    muscle cramps, stiffness, or weakness    redness, blistering, peeling or loosening of the skin, including inside the mouth    trouble passing urine or change in the amount of urine  Side effects that usually do not require medical attention (report to your doctor or health care professional if they continue or are bothersome):    bone, joint, or muscle pain    constipation    diarrhea    fever    hair loss    irritation at site where injected    loss of appetite    nausea, vomiting    stomach upset    trouble sleeping    trouble swallowing    weak or tired  This list may not describe all possible side effects. Call your doctor for medical advice about side effects. You may report side effects to FDA at 1-052-FDA-4856.  Where should I keep my medicine?  This drug is given in a hospital or clinic and will not be stored at home.  NOTE:This sheet is a summary. It may not cover all possible information. If you have questions about this medicine, talk to your doctor, pharmacist, or health care provider. Copyright  2016 Gold Standard             We look forward in seeing you on your next appointment here at [facility] ([facility]).  Please don t hesitate to call us at [phone] to reschedule any of your appointments or to speak with one of the [facility] registered nurses.  It was a pleasure taking care of you today.    Sincerely,    [facility]  [facility]  [address] [place] [address]  [id number]  Phone:  [phone]

## 2022-01-11 NOTE — NURSING NOTE
Chief Complaint   Patient presents with     Blood Draw     Labs drawn via PIV by RN in lab. VS taken       Labs drawn from PIV placed by RN. Line flushed with saline. Vitals taken. Pt checked in for appointment(s).      Julieta Horn RN

## 2022-01-11 NOTE — LETTER
1/11/2022         RE: Cindy Espinoza  [address] [address]  [place] [address]        Dear Colleague,    Thank you for referring your patient, Cindy Espinoza, to the [facility]. Please see a copy of my visit note below.    Infusion Nursing Note:  Cindy Espinoza presents today for reclast.    Patient seen by provider today: No   present during visit today: Yes [place] via phone    Note: infused over approximately 15 minutes.    Intravenous Access:  Peripheral IV placed in second floor lab prior to [facility]    Treatment Conditions:  Calcium and Creatinine clearance within order parameters to infuse today.    Post Infusion Assessment:  Patient tolerated infusion without incident.  Blood return noted pre and post infusion.  Site patent and intact, free from redness, edema or discomfort.  No evidence of extravasations.  Access discontinued per protocol.     Discharge Plan:   Discharge instructions reviewed with: Patient.  Patient and/or family verbalized understanding of discharge instructions and all questions answered.  Copy of AVS reviewed with patient and/or family.  Patient discharged in stable condition accompanied by: self.  Departure Mode: Ambulatory.    Administrations This Visit     zoledronic Acid (RECLAST) infusion 5 mg     Admin Date  01/11/2022 Action  New Bag Dose  5 mg Rate  400 mL/hr Route  Intravenous Administered By  Carly Caruso, RN                Carly Caruso, EYAD            Again, thank you for allowing me to participate in the care of your patient.      Sincerely,    VIDEO,

## 2022-01-12 ENCOUNTER — TELEPHONE (OUTPATIENT)
Dept: ENDOCRINOLOGY | Facility: CLINIC | Age: 62
End: 2022-01-12
Payer: COMMERCIAL

## 2022-01-12 NOTE — TELEPHONE ENCOUNTER
----- Message from Katie Birch MD sent at 1/11/2022  6:58 PM CST -----  Labs reviewed in Dr. Herrera's absence.  Creatinine and calcium checked prior to Reclast infusion are normal.  No additional intervention.

## 2022-01-12 NOTE — TELEPHONE ENCOUNTER
Labs reviewed in Dr. Herrera's absence.  Creatinine and calcium checked prior to Reclast infusion are normal.  No additional intervention.               Associated Results

## 2022-03-09 ENCOUNTER — TELEPHONE (OUTPATIENT)
Dept: FAMILY MEDICINE | Facility: CLINIC | Age: 62
End: 2022-03-09

## 2022-03-09 ENCOUNTER — OFFICE VISIT (OUTPATIENT)
Dept: FAMILY MEDICINE | Facility: CLINIC | Age: 62
End: 2022-03-09
Payer: COMMERCIAL

## 2022-03-09 VITALS
WEIGHT: 164 LBS | RESPIRATION RATE: 16 BRPM | HEART RATE: 84 BPM | BODY MASS INDEX: 28 KG/M2 | TEMPERATURE: 98.1 F | OXYGEN SATURATION: 96 % | HEIGHT: 64 IN | DIASTOLIC BLOOD PRESSURE: 83 MMHG | SYSTOLIC BLOOD PRESSURE: 126 MMHG

## 2022-03-09 DIAGNOSIS — G89.29 CHRONIC BILATERAL LOW BACK PAIN WITHOUT SCIATICA: Primary | ICD-10-CM

## 2022-03-09 DIAGNOSIS — E21.3 HYPERPARATHYROIDISM (H): ICD-10-CM

## 2022-03-09 DIAGNOSIS — M99.05 SOMATIC DYSFUNCTION OF PELVIC REGION: ICD-10-CM

## 2022-03-09 DIAGNOSIS — M79.7 FIBROMYALGIA: ICD-10-CM

## 2022-03-09 DIAGNOSIS — M54.50 CHRONIC BILATERAL LOW BACK PAIN WITHOUT SCIATICA: Primary | ICD-10-CM

## 2022-03-09 DIAGNOSIS — Z63.6 CAREGIVER BURDEN: ICD-10-CM

## 2022-03-09 DIAGNOSIS — R10.11 RUQ ABDOMINAL PAIN: ICD-10-CM

## 2022-03-09 PROCEDURE — 99214 OFFICE O/P EST MOD 30 MIN: CPT | Mod: 25 | Performed by: FAMILY MEDICINE

## 2022-03-09 PROCEDURE — 98925 OSTEOPATH MANJ 1-2 REGIONS: CPT | Performed by: FAMILY MEDICINE

## 2022-03-09 SDOH — HEALTH STABILITY: PHYSICAL HEALTH: ON AVERAGE, HOW MANY DAYS PER WEEK DO YOU ENGAGE IN MODERATE TO STRENUOUS EXERCISE (LIKE A BRISK WALK)?: 7 DAYS

## 2022-03-09 SDOH — HEALTH STABILITY: PHYSICAL HEALTH: ON AVERAGE, HOW MANY MINUTES DO YOU ENGAGE IN EXERCISE AT THIS LEVEL?: 10 MIN

## 2022-03-09 SDOH — SOCIAL STABILITY - SOCIAL INSECURITY: DEPENDENT RELATIVE NEEDING CARE AT HOME: Z63.6

## 2022-03-09 NOTE — PATIENT INSTRUCTIONS
Abdominal Pain  1. I have ordered an ultrasound for your abdomen. Please schedule this at the .  2. You will have to not eat for 8 hours before you get it.    Back Pain  1. I recommend Physical Therapy to help with your back pain.  2. Use tylenol for pain. You can't take more than 8 tablets in a day.   3. Continue using a pillow between your legs when you sleep on your side.   4. When you sleep on your back, continue using a pillow under your knees.   5. Follow up with me if your pain isn't improving.

## 2022-03-09 NOTE — PROGRESS NOTES
Assessment & Plan     Chronic bilateral low back pain without sciatica, Fibromyalgia, Caregiver burden,  Somatic dysfunction of pelvic region   Chronic, no red flags, resume PT. Imaging 4-6 weeks if not improved.  - Physical Therapy Referral    Given that this has been interfering with the patient's quality of life and ADLs, after discussion and informed consent, we have together decided to address this concern with Osteopathic Manipulative Treatment.    Please see OMT Procedure Note below for the specifics of treatment.         OMT PROCEDURE NOTE      Body Region:  L-spine, Sacrum, and Pelvis/ Innominates  Somatic Dysfunction:   Asymmetry   Treatment: Direct Muscle Energy Techniques.   Outcome: Stable  Not improved, so her pelvis asymmetry is not cause of back pain. Rec PT.     The patient actively participated in OMT and was able to communicate both positive and negative feedback throughout. OMT completed without incident. Patient tolerated treatment well. Patient reported that ROM, function, and/or pain level were improved. Advised that pain is occasionally worse during the first 24 hours after treatment and that drinking more water and taking Tylenol or Ibuprofen often help. Patient to return as needed for repeat osteopathic evaluation.         RUQ abdominal pain  Has had labs done to f/u on this, but never repeat imaging since her cholecystectomy.   - US Abdomen Limited - In Clinic    Hyperparathyroidism  Related to osteoporosis and parathyroid adenoma. Seeing endocrine. Restarted  Reclast infusions January of this year. Repeat annualy        35 minutes spent on the date of the encounter doing chart review, history and exam, documentation and further activities per the note excluding OMT    The information in this document, created by the medical scribe for me, accurately reflects the services I personally performed and the decisions made by me. I have reviewed and approved this document for accuracy prior to  "leaving the patient care area.  Arleth Phelps MD  2:06 PM, 03/09/22  [facility] DARIAN White is a 62 year old who presents for the following health issues  accompanied by her .    HPI     Abdominal Pain  The patient is having pain in her abdomen, more so on the right. She is wondering if it is related to her gallbladder surgery. She feels pain when she touches her abdomen and when she eats. She does not eat fatty or greasy foods. The pain comes less than once a week when eating. She reports bowel movements that are soft.     Back Pain  The back pain is similar to what she has had before. She reports it is muscle pain along the spine, more so on the right. The pain occurs when she sleeps, she will wake up because she is in pain. The pain worsens with dishwashing, doing laundry, standing, and walking. She has to take tylenol before going out to help with the pain. She is not doing any heavy lifting. Denies pain in legs.       Review of Systems   Positive for: back pain, abdominal pain  Negative for: pain in legs    This document serves as a record of the services and decisions personally performed and made by Arleth Phelps MD. It was created on his/her behalf by Angy Chandra, a trained medical scribe. The creation of this document is based on the provider's statements to the medical scribe.  Mario Chandra 2:06 PM, March 9, 2022        Objective    /83   Pulse 84   Temp 98.1  F (36.7  C) (Oral)   Resp 16   Ht 1.615 m (5' 3.58\")   Wt 74.4 kg (164 lb)   LMP  (LMP Unknown)   SpO2 96%   BMI 28.52 kg/m    Body mass index is 28.52 kg/m .   Vitals were reviewed and were normal.   Physical Exam   GENERAL: healthy, alert and no distress  ABDOMEN: soft, nontender, no hepatosplenomegaly, no masses and diminished bowel sounds. Points to an area half between umbilicus and costal margin approximately 4 fingerbreadth's from inferior costal margin, no overlying skin change. " surrounding the area are scars consitent with cultural healing and 8cm from sight of pain is a horizontal, linear. well healed surgical scar. No pain along costal margin or cartilages. No rebound tenderness.  MS: Exaggerated lumbar lordosis, left paraspinal spasm lumbar to lower thoracic, right non tender, right hip is fingerbreadth higher at iliac crest. R SI joint is 1cm higher. favor negative, Internal/external rotation on left is normal. Favor negative, internal/external rotation normal on right.  NEURO: Gait normal  PSYCH: mentation appears normal, affect normal

## 2022-03-09 NOTE — CONFIDENTIAL NOTE
RN spoke to the patient and requested to send the  referral of the Physical therapy to this address.  [address] [address] [id number]  [place]  [address]    Ama Rahman RN

## 2022-03-10 NOTE — TELEPHONE ENCOUNTER
Faxed referral to [facility] @ [phone]. Successful send.    Eda Shirley CC

## 2022-03-11 ENCOUNTER — TELEPHONE (OUTPATIENT)
Dept: FAMILY MEDICINE | Facility: CLINIC | Age: 62
End: 2022-03-11
Payer: COMMERCIAL

## 2022-03-11 NOTE — CONFIDENTIAL NOTE
RN called the patent and informed that the physical therapy place that she  the requested has been sent the order and the will will contact her.  Ama Rahman RN

## 2022-03-16 ENCOUNTER — ANCILLARY PROCEDURE (OUTPATIENT)
Dept: ULTRASOUND IMAGING | Facility: CLINIC | Age: 62
End: 2022-03-16
Attending: FAMILY MEDICINE
Payer: COMMERCIAL

## 2022-03-16 DIAGNOSIS — R10.11 RUQ ABDOMINAL PAIN: ICD-10-CM

## 2022-03-16 PROCEDURE — 76705 ECHO EXAM OF ABDOMEN: CPT | Mod: GC | Performed by: RADIOLOGY

## 2022-03-16 NOTE — NURSING NOTE
Due to patient being non-English speaking/uses sign language, an  was used for this visit. Only for face-to-face interpretation by an external agency, date and length of interpretation can be found on the scanned worksheet.     name: Paradise Olmstead  Agency: [employer]  Language: Anguillan   Telephone number: [phone]  Type of interpretation: Telephone, spoken

## 2022-04-06 ENCOUNTER — TRANSFERRED RECORDS (OUTPATIENT)
Dept: HEALTH INFORMATION MANAGEMENT | Facility: CLINIC | Age: 62
End: 2022-04-06
Payer: COMMERCIAL

## 2022-04-08 ENCOUNTER — DOCUMENTATION ONLY (OUTPATIENT)
Dept: FAMILY MEDICINE | Facility: CLINIC | Age: 62
End: 2022-04-08
Payer: COMMERCIAL

## 2022-04-08 NOTE — PROGRESS NOTES
"When opening a documentation only encounter, be sure to enter in \"Chief Complaint\" Forms and in \" Comments\" Title of form, description if needed.    Cindy is a 62 year old  female  Form received via: Fax  Form now resides in: Provider Ready    Silvia Awad MA                  "

## 2022-04-19 DIAGNOSIS — E55.9 VITAMIN D DEFICIENCY: ICD-10-CM

## 2022-04-19 RX ORDER — CHOLECALCIFEROL (VITAMIN D3) 50 MCG
50 TABLET ORAL DAILY
Qty: 100 TABLET | Refills: 3 | Status: SHIPPED | OUTPATIENT
Start: 2022-04-19 | End: 2023-01-26

## 2022-04-19 NOTE — TELEPHONE ENCOUNTER
"Request for medication refill:  vitamin D3 (CHOLECALCIFEROL) 50 mcg (2000 units) tablet  Providers if patient needs an appointment and you are willing to give a one month supply please refill for one month and  send a letter/MyChart using \".SMILLIMITEDREFILL\" .smillimited and route chart to \"P [facility] \" (Giving one month refill in non controlled medications is strongly recommended before denial)    If refill has been denied, meaning absolutely no refills without visit, please complete the smart phrase \".smirxrefuse\" and route it to the \"P SMI MED REFILLS\"  pool to inform the patient and the pharmacy.    Shira Ford MA        "

## 2022-06-06 ENCOUNTER — APPOINTMENT (OUTPATIENT)
Dept: INTERPRETER SERVICES | Facility: CLINIC | Age: 62
End: 2022-06-06
Payer: COMMERCIAL

## 2022-06-08 ENCOUNTER — OFFICE VISIT (OUTPATIENT)
Dept: ENDOCRINOLOGY | Facility: CLINIC | Age: 62
End: 2022-06-08
Payer: COMMERCIAL

## 2022-06-08 VITALS
HEART RATE: 68 BPM | HEIGHT: 63 IN | WEIGHT: 163.5 LBS | DIASTOLIC BLOOD PRESSURE: 73 MMHG | SYSTOLIC BLOOD PRESSURE: 127 MMHG | BODY MASS INDEX: 28.97 KG/M2

## 2022-06-08 DIAGNOSIS — E21.3 HYPERPARATHYROIDISM (H): Primary | ICD-10-CM

## 2022-06-08 DIAGNOSIS — M81.0 AGE-RELATED OSTEOPOROSIS WITHOUT CURRENT PATHOLOGICAL FRACTURE: ICD-10-CM

## 2022-06-08 PROCEDURE — 99215 OFFICE O/P EST HI 40 MIN: CPT | Performed by: STUDENT IN AN ORGANIZED HEALTH CARE EDUCATION/TRAINING PROGRAM

## 2022-06-08 RX ORDER — HEPARIN SODIUM (PORCINE) LOCK FLUSH IV SOLN 100 UNIT/ML 100 UNIT/ML
5 SOLUTION INTRAVENOUS
Status: CANCELLED | OUTPATIENT
Start: 2023-01-23

## 2022-06-08 RX ORDER — DIPHENHYDRAMINE HYDROCHLORIDE 50 MG/ML
50 INJECTION INTRAMUSCULAR; INTRAVENOUS
Status: CANCELLED
Start: 2023-01-23

## 2022-06-08 RX ORDER — ALBUTEROL SULFATE 0.83 MG/ML
2.5 SOLUTION RESPIRATORY (INHALATION)
Status: CANCELLED | OUTPATIENT
Start: 2023-01-23

## 2022-06-08 RX ORDER — ALBUTEROL SULFATE 90 UG/1
1-2 AEROSOL, METERED RESPIRATORY (INHALATION)
Status: CANCELLED
Start: 2023-01-23

## 2022-06-08 RX ORDER — NALOXONE HYDROCHLORIDE 0.4 MG/ML
0.2 INJECTION, SOLUTION INTRAMUSCULAR; INTRAVENOUS; SUBCUTANEOUS
Status: CANCELLED | OUTPATIENT
Start: 2023-01-23

## 2022-06-08 RX ORDER — ZOLEDRONIC ACID 5 MG/100ML
5 INJECTION, SOLUTION INTRAVENOUS ONCE
Status: CANCELLED
Start: 2023-01-23

## 2022-06-08 RX ORDER — ACETAMINOPHEN 325 MG/1
650 TABLET ORAL ONCE
Status: CANCELLED | OUTPATIENT
Start: 2023-01-23

## 2022-06-08 RX ORDER — MEPERIDINE HYDROCHLORIDE 25 MG/ML
25 INJECTION INTRAMUSCULAR; INTRAVENOUS; SUBCUTANEOUS EVERY 30 MIN PRN
Status: CANCELLED | OUTPATIENT
Start: 2023-01-23

## 2022-06-08 RX ORDER — METHYLPREDNISOLONE SODIUM SUCCINATE 125 MG/2ML
125 INJECTION, POWDER, LYOPHILIZED, FOR SOLUTION INTRAMUSCULAR; INTRAVENOUS
Status: CANCELLED
Start: 2023-01-23

## 2022-06-08 RX ORDER — EPINEPHRINE 1 MG/ML
0.3 INJECTION, SOLUTION, CONCENTRATE INTRAVENOUS EVERY 5 MIN PRN
Status: CANCELLED | OUTPATIENT
Start: 2023-01-23

## 2022-06-08 RX ORDER — HEPARIN SODIUM,PORCINE 10 UNIT/ML
5 VIAL (ML) INTRAVENOUS
Status: CANCELLED | OUTPATIENT
Start: 2023-01-23

## 2022-06-08 ASSESSMENT — PAIN SCALES - GENERAL: PAINLEVEL: NO PAIN (0)

## 2022-06-08 NOTE — NURSING NOTE
"Chief Complaint   Patient presents with     Endocrine Problem     Vital signs:      BP: 127/73 Pulse: 68           Height: 160 cm (5' 3\") Weight: 74.2 kg (163 lb 8 oz)  Estimated body mass index is 28.96 kg/m  as calculated from the following:    Height as of this encounter: 1.6 m (5' 3\").    Weight as of this encounter: 74.2 kg (163 lb 8 oz).          "

## 2022-06-08 NOTE — PROGRESS NOTES
Endocrinology Clinic Visit     NAME:  Cindy Espinoza  PCP:  Arleth Phelps  MRN:  [id number]  Reason for Consult:  Hyperparathyroidism   Requesting Provider:  Referred Self    Chief Complaint     Chief Complaint   Patient presents with     Endocrine Problem       History of Present Illness     Cindy Espinoza is a 61 year old female who is seen in clinic for management of hyperparathyroidism and osteoporosis.  Last seen11/10/2021    Her PMh is otherwise unremarkable and she is not on any chronic meds.     She has long hx of vitD deficiency dating back in our records to 2011 with normal calcium ( on the upper limit of normal) of 10.2 . She had elevated calcium since 2012 ranging between 10.3-10.6. she elevated PTH of 84,11,137 back in 2012 - 2015 in the setting of vit D defciency and mild hyperCa. On 10/2018 viit D corrected to 28 then 32 and calcium down to 9.5 but PTH was still at 98. Since 2018 calcium has been ranging 10.1-10.3 with PTH of 88-97. kast Vit D check 12/2020 was 33, Ca 10.2 and PTH 97. Most recent Ca 10.1 on 5/2021.  She has normal kidney function.  No history of kidney stones.  No family history of kidney stones or calcium abnormalities.  Osteoporosis history as below    She had her first DXA 2012 with lowest T -3 at the lumbar spine.  DXA 9/2018 showed a T score of -3.3 at the lumbar spine. She had a Dxa scan 11/23/22: lowest T score of -2.8 at the lumbar spine. Bone density compared to the prior study in 2018 has increased at the lumbar spine , and increased at the right total femur by +11.3% and +4.2% respectively.     Fosamax 70 mg weekly was started [id number] switched to Reclast 10/2018 due to non-compliance (she reported difficulty remembering once a week pills).  She had her second IV Reclast infusion January 2022.  She tolerated the infusion well.  She denied any recent falls no bone fractures.  Today she reported chronic back pain, describes it as on the side and thinks it is muscular in  type    She is not on any calcium supplements but tries to get her calcium from diet with daily milk and yogurt intake.  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    Problem List     Patient Active Problem List   Diagnosis     Hypercalcemia     Menopause present     Fibromyalgia     Chronic tension headaches     Osteoporosis     Abnormal Pap smear of cervix     Caregiver burden     Chronic bilateral low back pain with right-sided sciatica     Age-related osteoporosis without current pathological fracture     Hyperparathyroidism (H)     Dry eyes     Decreased sense of vibration     Facial droop     Mixed hyperlipidemia     Need for shingles vaccine        Medications     Current Outpatient Medications   Medication     acetaminophen (TYLENOL) 500 MG tablet     bimatoprost (LUMIGAN) 0.03 % ophthalmic solution     cyclobenzaprine (FLEXERIL) 5 MG tablet     fluticasone (FLONASE) 50 MCG/ACT nasal spray     latanoprost (XALATAN) 0.005 % ophthalmic solution     LUMIGAN 0.01 % SOLN ophthalmic solution     CHERI 128 5 % ophthalmic ointment     omega 3 1000 MG CAPS     polyethylene glycol (MIRALAX) 17 GM/Dose powder     psyllium (METAMUCIL/KONSYL) 58.6 % powder     RESTASIS 0.05 % ophthalmic emulsion     TRAVATAN Z 0.004 % ophthalmic solution     vitamin D3 (CHOLECALCIFEROL) 50 mcg (2000 units) tablet     vitamin D3 (CHOLECALCIFEROL) 50 mcg (2000 units) tablet     VYZULTA 0.024 % SOLN ophthalmic solution     No current facility-administered medications for this visit.        Allergies     Allergies   Allergen Reactions     Contrast Dye Itching and Rash     Iodine Rash       Medical / Surgical History     Past Medical History:   Diagnosis Date     Ear pain     Left     Vitamin D deficiency      Past Surgical History:   Procedure Laterality Date     LAPAROSCOPIC CHOLECYSTECTOMY  03/2019    [facility]       Social History     Social History  "    Socioeconomic History     Marital status:      Spouse name: Not on file     Number of children: 2     Years of education: Not on file     Highest education level: Not on file   Occupational History     Occupation: had a [employer] in [facility]     Comment: Lives with children   Tobacco Use     Smoking status: Never Smoker     Smokeless tobacco: Never Used   Substance and Sexual Activity     Alcohol use: No     Drug use: No     Sexual activity: Not Currently   Other Topics Concern     Parent/sibling w/ CABG, MI or angioplasty before 65F 55M? Not Asked   Social History Narrative    Lives with adult son who has advanced XP and requires 24 hour care. Has another son named Scarlet, helps with care of Osbal[place].     , spouse  in      Social Determinants of Health     Financial Resource Strain: Low Risk      Difficulty of Paying Living Expenses: Not hard at all   Food Insecurity: No Food Insecurity     Worried About Running Out of Food in the Last Year: Never true     Ran Out of Food in the Last Year: Never true   Transportation Needs: No Transportation Needs     Lack of Transportation (Medical): No     Lack of Transportation (Non-Medical): No   Physical Activity: Insufficiently Active     Days of Exercise per Week: 7 days     Minutes of Exercise per Session: 10 min   Stress: Not on file   Social Connections: Not on file   Intimate Partner Violence: Not At Risk     Fear of Current or Ex-Partner: No     Emotionally Abused: No     Physically Abused: No     Sexually Abused: No   Housing Stability: Not on file       Family History     Family History   Problem Relation Age of Onset     Other - See Comments Son         xeroderma pigmentosum     Diabetes Son      Pulmonary Embolism Son        ROS   12 ROS completed , pertinent negative and postive in HPI    Physical Exam   /73 (BP Location: Left arm, Patient Position: Sitting, Cuff Size: Adult Regular)   Pulse 68   Ht 1.6 m (5' 3\")   Wt 74.2 kg " (163 lb 8 oz)   LMP  (LMP Unknown)   BMI 28.96 kg/m       General: Comfortable, no obvious distress, normal body habitus  Eyes: Sclera anicteric, moist conjunctiva  HENT: wearing a mask.  Neck: wearing ismael  CV: normal rate.   Resp:  good effort, no evidence of loud wheezing  Abdomen:  obese, non distended.   Skin: No rashes, lesions, or subcutaneous nodules on exposed skin.   Psych: Alert and oriented x 3. Appropriate affect, good insight  Extremities: No peripheral edema  Musculoskeletal: Appropriate muscle bulk and strength  Neuro: Moves all four extremities. No focal deficits on limited exam.    Labs/Imaging     Pertinent [facility] and [facility] and reviewed.  Radiology Results were  reviewed and updated in EPIC and reviewed.    Summary of recent findings:     TSH   Date Value Ref Range Status   12/28/2020 1.37 0.40 - 4.00 mU/L Final   04/12/2017 1.12 0.40 - 4.00 mU/L Final   05/07/2015 0.93 0.40 - 4.00 mU/L Final   09/02/2011 0.82 0.4 - 5.0 mU/L Final       Creatinine   Date Value Ref Range Status   01/11/2022 0.49 (L) 0.52 - 1.04 mg/dL Final   05/03/2021 0.63 0.52 - 1.04 mg/dL Final       No results found for: [id number], [id number], [id number]    Recent Labs   Lab Test 05/03/21  1709 12/28/20  1329 08/06/20  1649    140.4 137   POTASSIUM 3.9 3.6 3.9   CHLORIDE 106 100.8 106   CO2 27 25.2 26   ANIONGAP 4  --  5   GLC 95 103.8* 94   BUN 18 15.2 10   CR 0.63 0.6 0.54   AARON 10.1 10.2* 10.1       I personally reviewed the patient's outside records from [facility] EMR. Summary of pertinent findings in HPI.    Impression / Plan     1. Very mild Hypercalcemia.   2. Hyperparathyroidism  3. Hx of vitD deficiency  she was seen by Dr. Bernstein back in 2018. She saw Dr. Aguilar (Endocrine, [facility]) in 2012 and was diagnosed with primary hyperparathyroidism, and recommended parathyroidectomy due to osteoporosis. DXA scan then showed lowest T-score of -3.0 in the lumbar spine. Head and neck US then did  not seem to localize any parathyroid adenoma. ptsorin was lost to f/up until 2015, when she saw Dr. Trent Bonilla (Endocrine). She was advised observation. NM parathyroid imaging 2018 negative.     Primary hyperparathyroidism versus secondary hyperparathyroidism due to vitamin D deficiency.  PTH remains elevated despite correcting vitamin D which makes primary hyperparathyroidism likely. Familial Hypocalciuric Hypercalemia is on the differential given mild hypercalcemia and mild PTH elevation.  She had a urine spot for calcium over creatinine ratio within normal limits back in 2012.  She declined to do a 24-hour urine collection for calcium.      Plan:  - continue appropriate calcium replacement  -Continue vitamin D supplementation  -Repeat calcium, vitamin D, phosphorus, creatinine, albumin PTH in 6 month    I reviewed previously with her the latest NIH guidelines conclude that surgery for hyperparathyroidism is indicated in symptomatic patients, or in asymptomatic patients who meet any one of the following conditions:  Serum calcium concentration of 1.0 mg/dL (0.25 mmol/L) or more above the upper limit of normal   Creatinine clearance that is reduced to <60 mL/min   Bone density at the hip, lumbar spine, or distal radius that is more than 2.5 standard deviations below peak bone mass (T score <-2.5) and/or previous fragility fracture   Age less than 50 years  Operative intervention can be delayed in patients over 50 years of age who are asymptomatic or minimally symptomatic and who have serum calcium concentrations <1.0 mg/dL (0.2 mmol/L) above the upper limit of normal, and in patients who are medically unfit for surgery.        4.  Osteoporosis  Risk factors include age postmenopausal, vitamin D deficiency and hyperparathyroidism.  Significant improvement in her BMD seen on DEXA scan 11/2022.  She was on Fosamax with poor compliance 2014 through 2018.  She had 1st dose of IV Reclast in 2018, second dose January  2022.    Plan:  -Third dose IV Reclast January 2023 then plan for drug holiday.  -DEXA scan November 2024      Test and/or medications prescribed today:  No orders of the defined types were placed in this encounter.          Ivet Herrera MD  Endocrinology, Diabetes and Metabolism  [facility]

## 2022-06-08 NOTE — LETTER
6/8/2022       RE: Cindy Espinoza  [address] [facility] [address]  [place] [address]     Dear Colleague,    Thank you for referring your patient, Cindy Espinoza, to the [facility] ENDOCRINOLOGY CLINIC [place] [facility]. Please see a copy of my visit note below.    Endocrinology Clinic Visit     NAME:  Cindy Espinoza  PCP:  Arleth Phelps  MRN:  [id number]  Reason for Consult:  Hyperparathyroidism   Requesting Provider:  Referred Self    Chief Complaint     Chief Complaint   Patient presents with     Endocrine Problem       History of Present Illness     Cindy Espinoza is a 61 year old female who is seen in clinic for management of hyperparathyroidism and osteoporosis.  Last seen11/10/2021    Her PMh is otherwise unremarkable and she is not on any chronic meds.     She has long hx of vitD deficiency dating back in our records to 2011 with normal calcium ( on the upper limit of normal) of 10.2 . She had elevated calcium since 2012 ranging between 10.3-10.6. she elevated PTH of 84,11,137 back in 2012 - 2015 in the setting of vit D defciency and mild hyperCa. On 10/2018 viit D corrected to 28 then 32 and calcium down to 9.5 but PTH was still at 98. Since 2018 calcium has been ranging 10.1-10.3 with PTH of 88-97. kast Vit D check 12/2020 was 33, Ca 10.2 and PTH 97. Most recent Ca 10.1 on 5/2021.  She has normal kidney function.  No history of kidney stones.  No family history of kidney stones or calcium abnormalities.  Osteoporosis history as below    She had her first DXA 2012 with lowest T -3 at the lumbar spine.  DXA 9/2018 showed a T score of -3.3 at the lumbar spine. She had a Dxa scan 11/23/22: lowest T score of -2.8 at the lumbar spine. Bone density compared to the prior study in 2018 has increased at the lumbar spine , and increased at the right total femur by +11.3% and +4.2% respectively.     Fosamax 70 mg weekly was started [id number]  switched to Reclast 10/2018 due to non-compliance (she reported difficulty remembering once a week pills).  She had her second IV Reclast infusion January 2022.  She tolerated the infusion well.  She denied any recent falls no bone fractures.  Today she reported chronic back pain, describes it as on the side and thinks it is muscular in type    She is not on any calcium supplements but tries to get her calcium from diet with daily milk and yogurt intake.  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    Problem List     Patient Active Problem List   Diagnosis     Hypercalcemia     Menopause present     Fibromyalgia     Chronic tension headaches     Osteoporosis     Abnormal Pap smear of cervix     Caregiver burden     Chronic bilateral low back pain with right-sided sciatica     Age-related osteoporosis without current pathological fracture     Hyperparathyroidism (H)     Dry eyes     Decreased sense of vibration     Facial droop     Mixed hyperlipidemia     Need for shingles vaccine        Medications     Current Outpatient Medications   Medication     acetaminophen (TYLENOL) 500 MG tablet     bimatoprost (LUMIGAN) 0.03 % ophthalmic solution     cyclobenzaprine (FLEXERIL) 5 MG tablet     fluticasone (FLONASE) 50 MCG/ACT nasal spray     latanoprost (XALATAN) 0.005 % ophthalmic solution     LUMIGAN 0.01 % SOLN ophthalmic solution     CHERI 128 5 % ophthalmic ointment     omega 3 1000 MG CAPS     polyethylene glycol (MIRALAX) 17 GM/Dose powder     psyllium (METAMUCIL/KONSYL) 58.6 % powder     RESTASIS 0.05 % ophthalmic emulsion     TRAVATAN Z 0.004 % ophthalmic solution     vitamin D3 (CHOLECALCIFEROL) 50 mcg (2000 units) tablet     vitamin D3 (CHOLECALCIFEROL) 50 mcg (2000 units) tablet     VYZULTA 0.024 % SOLN ophthalmic solution     No current facility-administered medications for this visit.        Allergies     Allergies   Allergen  Reactions     Contrast Dye Itching and Rash     Iodine Rash       Medical / Surgical History     Past Medical History:   Diagnosis Date     Ear pain     Left     Vitamin D deficiency      Past Surgical History:   Procedure Laterality Date     LAPAROSCOPIC CHOLECYSTECTOMY  2019    [facility]       Social History     Social History     Socioeconomic History     Marital status:      Spouse name: Not on file     Number of children: 2     Years of education: Not on file     Highest education level: Not on file   Occupational History     Occupation: had a [employer] in [employer]     Comment: Lives with children   Tobacco Use     Smoking status: Never Smoker     Smokeless tobacco: Never Used   Substance and Sexual Activity     Alcohol use: No     Drug use: No     Sexual activity: Not Currently   Other Topics Concern     Parent/sibling w/ CABG, MI or angioplasty before 65F 55M? Not Asked   Social History Narrative    Lives with adult son who has advanced XP and requires 24 hour care. Has another son named Scarlet, helps with care of Osbaldo.     , spouse  in      Social Determinants of Health     Financial Resource Strain: Low Risk      Difficulty of Paying Living Expenses: Not hard at all   Food Insecurity: No Food Insecurity     Worried About Running Out of Food in the Last Year: Never true     Ran Out of Food in the Last Year: Never true   Transportation Needs: No Transportation Needs     Lack of Transportation (Medical): No     Lack of Transportation (Non-Medical): No   Physical Activity: Insufficiently Active     Days of Exercise per Week: 7 days     Minutes of Exercise per Session: 10 min   Stress: Not on file   Social Connections: Not on file   Intimate Partner Violence: Not At Risk     Fear of Current or Ex-Partner: No     Emotionally Abused: No     Physically Abused: No     Sexually Abused: No   Housing Stability: Not on file       Family History     Family History   Problem Relation Age of  "Onset     Other - See Comments Son         xeroderma pigmentosum     Diabetes Son      Pulmonary Embolism Son        ROS   12 ROS completed , pertinent negative and postive in HPI    Physical Exam   /73 (BP Location: Left arm, Patient Position: Sitting, Cuff Size: Adult Regular)   Pulse 68   Ht 1.6 m (5' 3\")   Wt 74.2 kg (163 lb 8 oz)   LMP  (LMP Unknown)   BMI 28.96 kg/m       General: Comfortable, no obvious distress, normal body habitus  Eyes: Sclera anicteric, moist conjunctiva  HENT: wearing a mask.  Neck: wearing ismael  CV: normal rate.   Resp:  good effort, no evidence of loud wheezing  Abdomen:  obese, non distended.   Skin: No rashes, lesions, or subcutaneous nodules on exposed skin.   Psych: Alert and oriented x 3. Appropriate affect, good insight  Extremities: No peripheral edema  Musculoskeletal: Appropriate muscle bulk and strength  Neuro: Moves all four extremities. No focal deficits on limited exam.    Labs/Imaging     Pertinent Labs were reviewed and updated in [facility] and reviewed.  Radiology Results were  reviewed and updated in EPIC and reviewed.    Summary of recent findings:     TSH   Date Value Ref Range Status   12/28/2020 1.37 0.40 - 4.00 mU/L Final   04/12/2017 1.12 0.40 - 4.00 mU/L Final   05/07/2015 0.93 0.40 - 4.00 mU/L Final   09/02/2011 0.82 0.4 - 5.0 mU/L Final       Creatinine   Date Value Ref Range Status   01/11/2022 0.49 (L) 0.52 - 1.04 mg/dL Final   05/03/2021 0.63 0.52 - 1.04 mg/dL Final       No results found for: [id number], [id number], [id number]    Recent Labs   Lab Test 05/03/21  1709 12/28/20  1329 08/06/20  1649    140.4 137   POTASSIUM 3.9 3.6 3.9   CHLORIDE 106 100.8 106   CO2 27 25.2 26   ANIONGAP 4  --  5   GLC 95 103.8* 94   BUN 18 15.2 10   CR 0.63 0.6 0.54   AARON 10.1 10.2* 10.1       I personally reviewed the patient's outside records from [facility] EMR. Summary of pertinent findings in HPI.    Impression / Plan     1. Very mild Hypercalcemia.   2. " Hyperparathyroidism  3. Hx of vitD deficiency  she was seen by Dr. Bernstein back in 2018. She saw Dr. Aguilar (Endocrine, [facility]) in 2012 and was diagnosed with primary hyperparathyroidism, and recommended parathyroidectomy due to osteoporosis. DXA scan then showed lowest T-score of -3.0 in the lumbar spine. Head and neck US then did not seem to localize any parathyroid adenoma. ptsorin was lost to f/up until 2015, when she saw Dr. Trent Bonilla (Endocrine). She was advised observation. NM parathyroid imaging 2018 negative.     Primary hyperparathyroidism versus secondary hyperparathyroidism due to vitamin D deficiency.  PTH remains elevated despite correcting vitamin D which makes primary hyperparathyroidism likely. Familial Hypocalciuric Hypercalemia is on the differential given mild hypercalcemia and mild PTH elevation.  She had a urine spot for calcium over creatinine ratio within normal limits back in 2012.  She declined to do a 24-hour urine collection for calcium.      Plan:  - continue appropriate calcium replacement  -Continue vitamin D supplementation  -Repeat calcium, vitamin D, phosphorus, creatinine, albumin PTH in 6 month    I reviewed previously with her the latest NIH guidelines conclude that surgery for hyperparathyroidism is indicated in symptomatic patients, or in asymptomatic patients who meet any one of the following conditions:  Serum calcium concentration of 1.0 mg/dL (0.25 mmol/L) or more above the upper limit of normal   Creatinine clearance that is reduced to <60 mL/min   Bone density at the hip, lumbar spine, or distal radius that is more than 2.5 standard deviations below peak bone mass (T score <-2.5) and/or previous fragility fracture   Age less than 50 years  Operative intervention can be delayed in patients over 50 years of age who are asymptomatic or minimally symptomatic and who have serum calcium concentrations <1.0 mg/dL (0.2 mmol/L) above the upper limit of normal, and in patients  who are medically unfit for surgery.        4.  Osteoporosis  Risk factors include age postmenopausal, vitamin D deficiency and hyperparathyroidism.  Significant improvement in her BMD seen on DEXA scan 11/2022.  She was on Fosamax with poor compliance 2014 through 2018.  She had 1st dose of IV Reclast in 2018, second dose January 2022.    Plan:  -Third dose IV Reclast January 2023 then plan for drug holiday.  -DEXA scan November 2024      Test and/or medications prescribed today:  No orders of the defined types were placed in this encounter.          Ivet Herrera MD  Endocrinology, Diabetes and Metabolism  [facility]

## 2022-06-14 ENCOUNTER — OFFICE VISIT (OUTPATIENT)
Dept: FAMILY MEDICINE | Facility: CLINIC | Age: 62
End: 2022-06-14
Payer: COMMERCIAL

## 2022-06-14 VITALS
SYSTOLIC BLOOD PRESSURE: 127 MMHG | HEIGHT: 63 IN | BODY MASS INDEX: 28.88 KG/M2 | WEIGHT: 163 LBS | TEMPERATURE: 98.2 F | HEART RATE: 70 BPM | OXYGEN SATURATION: 97 % | RESPIRATION RATE: 16 BRPM | DIASTOLIC BLOOD PRESSURE: 84 MMHG

## 2022-06-14 DIAGNOSIS — M25.562 ACUTE PAIN OF LEFT KNEE: Primary | ICD-10-CM

## 2022-06-14 PROCEDURE — 99213 OFFICE O/P EST LOW 20 MIN: CPT | Performed by: FAMILY MEDICINE

## 2022-06-14 NOTE — PROGRESS NOTES
"  Assessment & Plan     Acute pain of left knee  Likely meniscus injury    Take tylenol (2 pills) 3 times a day  Heat to knee once a day  You may use ice on your knee to help the pain  Physical therapy at [facility] - make your own appointment    MRI of your knee  See Dr Phelps after the MRI to review the results      - Physical Therapy Referral; Future  - XR Knee Left 3 Views; Future  - MR Knee Left w/o Contrast; Future               Shad Chadwick MD  [facility] DARIAN White is a 62 year old who presents for the following health issues     HPI   1. Knee/back pain  Pain at started lower bilateral back, pain has now gone to knees and knee feel \"swollen.\" Still feeling back pain, \"normal\" for patient, but knee pain is new.     Swollen knee started after prayer  getting worse  Painful to bend  No locking  No giving way    Taking tylenol once a day  No ice or heat              Review of Systems         Objective    /84   Pulse 70   Temp 98.2  F (36.8  C) (Oral)   Resp 16   Ht 1.6 m (5' 2.99\")   Wt 73.9 kg (163 lb)   LMP  (LMP Unknown)   SpO2 97%   BMI 28.88 kg/m    Body mass index is 28.88 kg/m .     Physical Exam   GENERAL: healthy, alert and no distress  MS: L KNEE - mild effusion, +Lucia, patella - neg, MCL/LCL - nontender, intact; ACL - solid Lachman's    Knee xray - normal                "

## 2022-06-14 NOTE — PATIENT INSTRUCTIONS
1. Left knee swelling and pain  Possible cartilage injury (meniscus)    Take tylenol (2 pills) 3 times a day  Heat to knee once a day  You may use ice on your knee to help the pain  Physical therapy at [facility] - make your own appointment    MRI of your knee  See your doctor after the MRI to review the results      - Physical Therapy Referral; Future  - XR Knee Left 3 Views; Future  - MR Knee Left w/o Contrast; Future    PHarper        [facility] PT referral  Faxed to [phone] they will review and contact patient.

## 2022-06-15 ENCOUNTER — ANCILLARY PROCEDURE (OUTPATIENT)
Dept: GENERAL RADIOLOGY | Facility: CLINIC | Age: 62
End: 2022-06-15
Attending: FAMILY MEDICINE
Payer: COMMERCIAL

## 2022-06-15 ENCOUNTER — TELEPHONE (OUTPATIENT)
Dept: FAMILY MEDICINE | Facility: CLINIC | Age: 62
End: 2022-06-15

## 2022-06-15 DIAGNOSIS — M25.562 ACUTE PAIN OF LEFT KNEE: ICD-10-CM

## 2022-06-15 PROCEDURE — 73562 X-RAY EXAM OF KNEE 3: CPT | Mod: LT | Performed by: RADIOLOGY

## 2022-06-15 NOTE — CONFIDENTIAL NOTE
Patient arrived to [facility] requested to tell the result of knee X-ray ,informed that did not read yet will come back tomorrow .  Ama Rahman RN

## 2022-07-10 ENCOUNTER — ANCILLARY PROCEDURE (OUTPATIENT)
Dept: MRI IMAGING | Facility: CLINIC | Age: 62
End: 2022-07-10
Attending: FAMILY MEDICINE
Payer: COMMERCIAL

## 2022-07-10 DIAGNOSIS — M25.562 ACUTE PAIN OF LEFT KNEE: ICD-10-CM

## 2022-07-10 PROCEDURE — 73721 MRI JNT OF LWR EXTRE W/O DYE: CPT | Mod: LT | Performed by: RADIOLOGY

## 2022-07-15 ENCOUNTER — TELEPHONE (OUTPATIENT)
Dept: FAMILY MEDICINE | Facility: CLINIC | Age: 62
End: 2022-07-15

## 2022-07-15 ENCOUNTER — OFFICE VISIT (OUTPATIENT)
Dept: FAMILY MEDICINE | Facility: CLINIC | Age: 62
End: 2022-07-15
Payer: COMMERCIAL

## 2022-07-15 VITALS
OXYGEN SATURATION: 96 % | TEMPERATURE: 98.2 F | BODY MASS INDEX: 28.88 KG/M2 | HEIGHT: 63 IN | WEIGHT: 163 LBS | HEART RATE: 80 BPM | RESPIRATION RATE: 16 BRPM | DIASTOLIC BLOOD PRESSURE: 80 MMHG | SYSTOLIC BLOOD PRESSURE: 123 MMHG

## 2022-07-15 DIAGNOSIS — F43.9 STRESS: ICD-10-CM

## 2022-07-15 DIAGNOSIS — L91.8 SKIN TAG: ICD-10-CM

## 2022-07-15 DIAGNOSIS — M70.42 PREPATELLAR BURSITIS OF LEFT KNEE: ICD-10-CM

## 2022-07-15 DIAGNOSIS — R10.11 RUQ ABDOMINAL PAIN: ICD-10-CM

## 2022-07-15 DIAGNOSIS — M25.562 ACUTE PAIN OF LEFT KNEE: Primary | ICD-10-CM

## 2022-07-15 DIAGNOSIS — S83.282D TEAR OF LATERAL CARTILAGE OR MENISCUS OF KNEE, CURRENT, LEFT, SUBSEQUENT ENCOUNTER: ICD-10-CM

## 2022-07-15 PROCEDURE — 99215 OFFICE O/P EST HI 40 MIN: CPT | Performed by: FAMILY MEDICINE

## 2022-07-15 RX ORDER — MELOXICAM 7.5 MG/1
7.5 TABLET ORAL DAILY
Qty: 30 TABLET | Refills: 0 | Status: SHIPPED | OUTPATIENT
Start: 2022-07-15 | End: 2023-01-26

## 2022-07-15 NOTE — PROGRESS NOTES
DME (Durable Medical Equipment) Orders and Documentation  Orders Placed This Encounter   Procedures     Knee Supplies Order      The patient was assessed and it was determined the patient is in need of the following listed DME Supplies/Equipment. Please complete supporting documentation below to demonstrate medical necessity.      Knee Bracing Supplies Documentation  Patient requires the use of the ordered bracing device due to following medical need/condition: prepatellar bursitis assoc with pain and swelling

## 2022-07-15 NOTE — PATIENT INSTRUCTIONS
For Osbaldo, Aquaphor for treatment of wound, wrap with bandages.    Knee pain   Use knee sleeve, avoid kneeling or bending left knee.    2.    Apply ice pack over knee to reduce pain and inflammation.    3.    Prescribed Meloxicam. Take once a day for 7 days. You can restart this after a break of 2-3 days if needed.     4.    Continue physical therapy    Skin  Schedule with procedure clinic for skin tag removal for the neck

## 2022-07-15 NOTE — TELEPHONE ENCOUNTER
Client arrived to the [facility] and helped knee moralizer.  Ama Rahman RN

## 2022-07-15 NOTE — PROGRESS NOTES
Assessment & Plan     Acute pain of left knee  Prepatellar bursitis of left knee  Review Dr. Chadwick note and MRI with the patient, pain started after prayer, she is noting some improvement. Discussed options for management, she already is minimizing kneeling and attending Physical Therapy, which I encourage her to continue. She dislikes NSAIDs due to effect on kidneys but discussed short bursts of meloxicam daily could be effective for inflammation and pain. Provided knee brace. She can also ice.  - meloxicam (MOBIC) 7.5 MG tablet  Dispense: 30 tablet; Refill: 0  - Knee Supplies Order    Tear of lateral cartilage or meniscus of knee,- lateral compartment cartilage delamination, see MRI  This problem is more complex, discussed that sometimes orthopedic surgery would pin dehiscing cartilaginous flap into place, she is not interested in surgical options and declines referral. Continue Physical Therapy for strengthening, if she had further issues with lateral knee pain, see Sports medicine for additional recommendations.    RUQ abdominal pain  Chronic problem that predates cholecystectomy in 2019, reviewed March RUQ ultrasound which was normal. Suspect scar pain and educated on nature of ongoing pain.    Skin tag  Symptomatic, referred to Procedure Clinic for removal  - Procedure Clinic - [facility] Internal Referral    Stress  Son recently hospitalized, code status changed to DNR, significant stressors, provided emotional support, reccomended speaking with RN and [employer] if issues for her or her family occur.        54 minutes spent on the date of the encounter doing chart review, history and exam, documentation and further activities per the note       No follow-ups on file.    The information in this document, created by the medical scribe for me, accurately reflects the services I personally performed and the decisions made by me. I have reviewed and approved this document for accuracy prior to leaving the  "patient care area.  Arleth Phelps MD  2:41 PM, 07/15/22  [facility] DARIAN White is a 62 year old accompanied by her , presenting for the following health issues:  Knee Pain (Follow up on left knee pain and x-ray results of knee. Also experiencing back pain. Tylenol helps slightly. )      HPI   Patient presents to clinic concerned about her son, Mayela, recent discharge as he has a wound over his thigh. She is showing picture where formula was thick and clotted, but after gastric return, is thin. By video, ulceration present that is actively bleeding.     Knee pain  Patient affirms that she has pain in her left knee. This pain is not constant or achy, rather it is painful when she tries to stand or put too much weight on it. She reports that the swelling has decreased. She agrees that overall the pain ans swelling has decreased over time. She uses tylenol for pain, but is avoidant of ibuprofen due to concerns with her kidneys. She is resistant to surgical options given her age.     Additional pain  Patient complains of pain over stomach at point of surgery.     Skin  Patient complains of bumps over her neck. These have been there for 20 years, but have grown more in recent years and are bothersome. She is interested in nonsurgical options.  Review of Systems   Positive for: Left knee pain, bumps on neck, stomach pain  Negative for:    This document serves as a record of the services and decisions personally performed and made by Arleth Phelps MD. It was created on his/her behalf by Woodrow Bojorquez, a trained medical scribe. The creation of this document is based the provider's statements to the medical scribe.  Mario Bojorquez 2:41 PM, July 15, 2022      Objective    /80   Pulse 80   Temp 98.2  F (36.8  C) (Oral)   Resp 16   Ht 1.6 m (5' 2.99\")   Wt 73.9 kg (163 lb)   LMP  (LMP Unknown)   SpO2 96%   BMI 28.88 kg/m    Body mass index is 28.88 kg/m .  Physical " Exam   GENERAL: Chronic facial droop  MS: Prepatellar bursa is boggy and tender to palpation. Positive for joint effusion as well.  SKIN: Scattered pedunculated skin tags around the neck  PSYCH: Euthymic    MR Knee Left w/o Contrast    Result Date: 7/11/2022  MR left knee without contrast 7/11/2022 9:03 AM Techniques: Multiplanar multisequence imaging of the left knee was obtained without administration of intra-articular or intravenous contrast using routing protocol. History: Knee trauma, meniscal/ligament injury suspected, xray done (Age >= 1y); Acute pain of left knee Comparison: Radiograph dated 6/15/2022 Findings: MENISCI: Medial meniscus: Increased intrasubstance signal of the medial meniscus, free edge blunting of the body of the medial meniscus, increased signal at the meniscocapsular junction. Intact posterior root ligament. No evidence of displaced fragments or tear. Lateral meniscus: Intact. LIGAMENTS Cruciate ligaments: Intact. Medial supporting structures: Mildly increased signal at the proximal medial collateral ligament, evidence of low-grade sprain to the superficial and deep portions of the proximal MCL. Lateral supporting structures: Intact. EXTENSOR MECHANISM Intact. Significant prepatellar tendon edema, consistent with prepatellar tendon bursitis. FLUID Large joint effusion. No substantial Baker's cyst. OSSEOUS and ARTICULAR STRUCTURES Bones: No fracture, contusion, or osseous lesion is seen. Patellofemoral compartment: No hyaline cartilage disease. Medial compartment: Superficial cartilage fissuring. Lateral compartment: Focal area of likely cartilage delamination (series 7001 image 18 and series 8001, image 22) at the weightbearing aspect of the lateral femoral condyle this associated very mild bone marrow edema. The superficial layer of the cartilage appears to remain intact.. ANCILLARY FINDINGS None.     Impression: 1. Significant edema superficial to the patella tendon most consistent with  prepatellar tendon bursitis. 2. Low-grade sprain of the medial collateral ligament proximally. 3. Low-grade injury to the meniscocapsular junction of the body of the medial meniscus, free edge blunting of the body of the medial meniscus, however, no evidence of significant medial meniscal tear. 4. Focal small area of likely articular cartilage delamination at the chondral osseous junction with associated bone marrow edema involving the weightbearing aspect of the lateral femoral condyle. Superficial articular cartilage appears intact. 5. Large joint effusion. JUTTA ELLERMANN, MD   SYSTEM ID:  [id number]      .  ..

## 2022-09-06 ENCOUNTER — TELEPHONE (OUTPATIENT)
Dept: FAMILY MEDICINE | Facility: CLINIC | Age: 62
End: 2022-09-06

## 2022-09-06 NOTE — TELEPHONE ENCOUNTER
Patient call and requested to come back a lab that has been ordered on 6/9/2022.    Ama Rahman RN

## 2022-09-09 ENCOUNTER — LAB (OUTPATIENT)
Dept: LAB | Facility: CLINIC | Age: 62
End: 2022-09-09
Payer: COMMERCIAL

## 2022-09-09 DIAGNOSIS — E21.3 HYPERPARATHYROIDISM (H): ICD-10-CM

## 2022-09-09 LAB
ALBUMIN SERPL BCG-MCNC: 3.9 G/DL (ref 3.5–5.2)
CALCIUM SERPL-MCNC: 10.3 MG/DL (ref 8.8–10.2)
CREAT SERPL-MCNC: 0.6 MG/DL (ref 0.51–0.95)
GFR SERPL CREATININE-BSD FRML MDRD: >90 ML/MIN/1.73M2
PHOSPHATE SERPL-MCNC: 2.1 MG/DL (ref 2.5–4.5)
PTH-INTACT SERPL-MCNC: 50 PG/ML (ref 15–65)

## 2022-09-09 PROCEDURE — 82565 ASSAY OF CREATININE: CPT

## 2022-09-09 PROCEDURE — 82310 ASSAY OF CALCIUM: CPT

## 2022-09-09 PROCEDURE — 84100 ASSAY OF PHOSPHORUS: CPT

## 2022-09-09 PROCEDURE — 83970 ASSAY OF PARATHORMONE: CPT

## 2022-09-09 PROCEDURE — 82040 ASSAY OF SERUM ALBUMIN: CPT

## 2022-09-09 PROCEDURE — 82306 VITAMIN D 25 HYDROXY: CPT

## 2022-09-09 PROCEDURE — 36415 COLL VENOUS BLD VENIPUNCTURE: CPT

## 2022-09-16 LAB
DEPRECATED CALCIDIOL+CALCIFEROL SERPL-MC: <61 UG/L (ref 20–75)
VITAMIN D2 SERPL-MCNC: <5 UG/L
VITAMIN D3 SERPL-MCNC: 56 UG/L

## 2022-10-07 ENCOUNTER — OFFICE VISIT (OUTPATIENT)
Dept: FAMILY MEDICINE | Facility: CLINIC | Age: 62
End: 2022-10-07
Payer: COMMERCIAL

## 2022-10-07 VITALS
OXYGEN SATURATION: 98 % | HEART RATE: 81 BPM | BODY MASS INDEX: 28.88 KG/M2 | WEIGHT: 163 LBS | DIASTOLIC BLOOD PRESSURE: 76 MMHG | SYSTOLIC BLOOD PRESSURE: 116 MMHG | TEMPERATURE: 98 F

## 2022-10-07 DIAGNOSIS — L91.8 SKIN TAG: Primary | ICD-10-CM

## 2022-10-07 PROCEDURE — 99207 PR DROP WITH A PROCEDURE: CPT | Performed by: STUDENT IN AN ORGANIZED HEALTH CARE EDUCATION/TRAINING PROGRAM

## 2022-10-07 PROCEDURE — 11200 RMVL SKIN TAGS UP TO&INC 15: CPT | Mod: GC | Performed by: STUDENT IN AN ORGANIZED HEALTH CARE EDUCATION/TRAINING PROGRAM

## 2022-10-07 NOTE — PROGRESS NOTES
[facility] Procedure Note    Cindy Villagran a patient of Arleth Hale here for skin tag removal   Indication: patient preference    Consent: Affirmation of informed consent was signed and scanned into the medical record. Risks, benefits and alternatives were discussed. Patient's questions were elicited and answered.   Procedure safety checklist was completed:  Yes    Preoperative Diagnosis: Skin tags  Postoperative Diagnosis: same    Technique:   Used curved iris scissor to remove 6 skin tags in the following locations: neck  Skin prep drysol  Anesthesia none  EBL:   none  Complications:  No  Tolerance:  Pt tolerated procedure well and was in stable condition.       Follow up: Pt was instructed to call if bleeding, severe pain or foul smell.   Follow up only if unimproved.      Resident: Sunitha Burgess MD  Faculty: Young King MD present for and supervised this entire procedure.

## 2022-10-07 NOTE — PATIENT INSTRUCTIONS
Skin Tag Removal  When should I call my healthcare provider?   Call your provider right away if:   You have bleeding that cannot be stopped by putting pressure on the wound.   Your wound becomes red or has pus or you develop a fever (signs of infection).   You have significant pain that is not controlled with ibuprofen or tylenol.     Wound Care  1. Keep it covered for the first 2-3 days with a band aid, or if it is a large wound or is draining a lot, a small piece of gauze with tape.  2. Apply a thin film of vaseline over the area to keep it mildly moist and prevent scab formation.   3. Change your bandaid daily.  4. As you heal, the new skin will be very sensitive to sun - please protect your healing wound by covering up and using sunscreen.   5. Results will be mailed or called to you. It can take up to 10 days to get biopsy results back. If you do not hear from us, please call us at [phone] and let us know that you haven't received your results.

## 2023-01-26 ENCOUNTER — OFFICE VISIT (OUTPATIENT)
Dept: FAMILY MEDICINE | Facility: CLINIC | Age: 63
End: 2023-01-26
Payer: COMMERCIAL

## 2023-01-26 VITALS
HEART RATE: 67 BPM | HEIGHT: 63 IN | TEMPERATURE: 98 F | RESPIRATION RATE: 16 BRPM | BODY MASS INDEX: 29.27 KG/M2 | SYSTOLIC BLOOD PRESSURE: 124 MMHG | OXYGEN SATURATION: 97 % | DIASTOLIC BLOOD PRESSURE: 73 MMHG | WEIGHT: 165.2 LBS

## 2023-01-26 DIAGNOSIS — E21.3 HYPERPARATHYROIDISM (H): ICD-10-CM

## 2023-01-26 DIAGNOSIS — J30.2 SEASONAL ALLERGIC RHINITIS, UNSPECIFIED TRIGGER: ICD-10-CM

## 2023-01-26 DIAGNOSIS — Z12.11 SCREENING FOR COLON CANCER: ICD-10-CM

## 2023-01-26 DIAGNOSIS — G89.29 CHRONIC BILATERAL LOW BACK PAIN WITHOUT SCIATICA: ICD-10-CM

## 2023-01-26 DIAGNOSIS — M54.41 CHRONIC BILATERAL LOW BACK PAIN WITH RIGHT-SIDED SCIATICA: ICD-10-CM

## 2023-01-26 DIAGNOSIS — M79.7 FIBROMYALGIA: ICD-10-CM

## 2023-01-26 DIAGNOSIS — M81.0 AGE-RELATED OSTEOPOROSIS WITHOUT CURRENT PATHOLOGICAL FRACTURE: Primary | ICD-10-CM

## 2023-01-26 DIAGNOSIS — S83.282D TEAR OF LATERAL CARTILAGE OR MENISCUS OF KNEE, CURRENT, LEFT, SUBSEQUENT ENCOUNTER: ICD-10-CM

## 2023-01-26 DIAGNOSIS — E55.9 VITAMIN D DEFICIENCY: ICD-10-CM

## 2023-01-26 DIAGNOSIS — G89.29 CHRONIC BILATERAL LOW BACK PAIN WITH RIGHT-SIDED SCIATICA: ICD-10-CM

## 2023-01-26 DIAGNOSIS — M54.50 CHRONIC BILATERAL LOW BACK PAIN WITHOUT SCIATICA: ICD-10-CM

## 2023-01-26 DIAGNOSIS — M70.42 PREPATELLAR BURSITIS OF LEFT KNEE: ICD-10-CM

## 2023-01-26 PROCEDURE — 99214 OFFICE O/P EST MOD 30 MIN: CPT | Mod: GC | Performed by: STUDENT IN AN ORGANIZED HEALTH CARE EDUCATION/TRAINING PROGRAM

## 2023-01-26 RX ORDER — ACETAMINOPHEN 500 MG
1000 TABLET ORAL EVERY 8 HOURS PRN
Qty: 100 TABLET | Refills: 11 | Status: SHIPPED | OUTPATIENT
Start: 2023-01-26 | End: 2023-03-17

## 2023-01-26 RX ORDER — MELOXICAM 7.5 MG/1
7.5 TABLET ORAL DAILY
Qty: 30 TABLET | Refills: 0 | Status: SHIPPED | OUTPATIENT
Start: 2023-01-26 | End: 2023-03-17

## 2023-01-26 RX ORDER — FLUTICASONE PROPIONATE 50 MCG
2 SPRAY, SUSPENSION (ML) NASAL DAILY
Qty: 18.2 ML | Refills: 3 | Status: SHIPPED | OUTPATIENT
Start: 2023-01-26 | End: 2023-12-11

## 2023-01-26 RX ORDER — CHOLECALCIFEROL (VITAMIN D3) 50 MCG
50 TABLET ORAL DAILY
Qty: 100 TABLET | Refills: 3 | Status: SHIPPED | OUTPATIENT
Start: 2023-01-26 | End: 2023-03-17

## 2023-01-26 RX ORDER — LIFITEGRAST 50 MG/ML
SOLUTION/ DROPS OPHTHALMIC
COMMUNITY
Start: 2023-01-02 | End: 2023-06-26

## 2023-01-26 NOTE — PROGRESS NOTES
Assessment & Plan     Hyperparathyroidism (H)  Seeing endocrinology June 2023.    Age-related osteoporosis without current pathological fracture  Fibromyalgia  Chronic bilateral low back pain with right-sided sciatica  Chronic bilateral low back pain without sciatica  Tear of lateral cartilage or meniscus of knee, current, left, subsequent encounter  Prepatellar bursitis of left knee  - meloxicam (MOBIC) 7.5 MG tablet; Take 1 tablet (7.5 mg) by mouth daily  - acetaminophen (TYLENOL) 500 MG tablet; Take 2 tablets (1,000 mg) by mouth every 8 hours as needed for pain    Vitamin D deficiency  refill  - vitamin D3 (CHOLECALCIFEROL) 50 mcg (2000 units) tablet; Take 1 tablet (50 mcg) by mouth daily    Seasonal allergic rhinitis, unspecified trigger  refill  - fluticasone (FLONASE) 50 MCG/ACT nasal spray; Spray 2 sprays into both nostrils daily    Screening for colon cancer  - Fecal colorectal cancer screen FIT      Return for Physical Exam.    Dafne Arechiga DO  [facility] DAIRAN White is a 63 year old presenting for the following health issues:  Refill Request (All medications and vitamin) and Back Pain (Pt reports lower back pain, left knee pain and right kidney pain.)    HPI     Last saw ophthalmologist a few months ago, reports that she is supposed to be taking 4 different eye drops but can't pick them up until next week. Does not need refills from me today    Flonase helps with nasal congestion when she is flying    Vitamin D 2000mg daily; wnl 9/9/22.     L knee pain: MRI + meniscal tear. Patient still feeling pain, helped by Meloxicam daily and Tylenol. Had a knee brace provided but has not been wearing. Does not want to get surgery or referral to orthopedics. Referred to PT and went- felt that it didn't help. Trouble putting the knee on the floor to pray     7/10/22 MRI  Impression:  1. Significant edema superficial to the patella tendon most consistent  with prepatellar tendon  "bursitis.  2. Low-grade sprain of the medial collateral ligament proximally.  3. Low-grade injury to the meniscocapsular junction of the body of the  medial meniscus, free edge blunting of the body of the medial  meniscus, however, no evidence of significant medial meniscal tear.  4. Focal small area of likely articular cartilage delamination at the  chondral osseous junction with associated bone marrow edema involving  the weightbearing aspect of the lateral femoral condyle. Superficial  articular cartilage appears intact.  5. Large joint effusion.      Review of Systems   Constitutional, HEENT, cardiovascular, pulmonary, gi and gu systems are negative, except as otherwise noted.      Objective    /73 (BP Location: Left arm, Patient Position: Sitting, Cuff Size: Adult Large)   Pulse 67   Temp 98  F (36.7  C) (Oral)   Resp 16   Ht 1.61 m (5' 3.39\")   Wt 74.9 kg (165 lb 3.2 oz)   LMP  (LMP Unknown)   SpO2 97%   BMI 28.91 kg/m    Body mass index is 28.91 kg/m .     Physical Exam   General: Alert and oriented, in no acute distress.  Skin: Warm and dry, no abnormalities noted.  Eyes: Extra-ocular muscles intact, pupils equal and reactive.  ENT: Speech intact, nasal passages open, no hearing impairment noted.  CV: No cyanosis or pallor, warm and well perfused.  : no CVA tenderness  Respiratory: No respiratory distress, no accessory muscle use.  Neuro: Gait and station normal, comprehension intact. Gross and fine motor skills intact.   Psychiatric: Mood and affect appear normal.   Extremities: L knee with mild swelling    Dafne Arechiga, DO   she/her  PGY-3  Family Medicine        "

## 2023-01-26 NOTE — PATIENT INSTRUCTIONS
Patient Education   Here is the plan from today's visit    9. Chronic bilateral low back pain without sciatica  3. Fibromyalgia  4. Chronic bilateral low back pain with right-sided sciatica  5. Tear of lateral cartilage or meniscus of knee, current, left, subsequent encounter  10. Prepatellar bursitis of left knee  - acetaminophen (TYLENOL) 500 MG tablet; Take 2 tablets (1,000 mg) by mouth every 8 hours as needed for pain  Dispense: 100 tablet; Refill: 11  - meloxicam (MOBIC) 7.5 MG tablet; Take 1 tablet (7.5 mg) by mouth daily  Dispense: 30 tablet; Refill: 0    1. Hyperparathyroidism (H)  2. Age-related osteoporosis without current pathological fracture  Continue Vitamin D supplementation    7. Vitamin D deficiency  - vitamin D3 (CHOLECALCIFEROL) 50 mcg (2000 units) tablet; Take 1 tablet (50 mcg) by mouth daily  Dispense: 100 tablet; Refill: 3    8. Seasonal allergic rhinitis, unspecified trigger  - fluticasone (FLONASE) 50 MCG/ACT nasal spray; Spray 2 sprays into both nostrils daily  Dispense: 18.2 mL; Refill: 3          Please call or return to clinic if your symptoms don't go away.    Follow up plan  No follow-ups on file.    Thank you for coming to [facility] today.  Lab Testing:  **If you had lab testing today and your results are reassuring or normal they will be mailed to you or sent through [employer] within 7 days.   **If the lab tests need quick action we will call you with the results.  **If you are having labs done on a different day, please call [phone] to schedule at [facility] or [phone] for other [facility] locations. Labs do not offer walk-in appointments.  The phone number we will call with results is # [phone] (home) . If this is not the best number please call our clinic and change the number.  Medication Refills:  If you need any refills please call your pharmacy and they will contact us.   If you need to  your refill at a new pharmacy, please  contact the new pharmacy directly. The new pharmacy will help you get your medications transferred faster.   Scheduling:  If you have any concerns about today's visit or wish to schedule another appointment please call our office during normal business hours [phone] (8-5:00 M-F). If you can no longer make a scheduled visit, please cancel via [employer] or call us to cancel.   If a referral was made to an [facility] specialty provider and you do not get a call from central scheduling, please refer to directions on your visit summary or call our office during normal business hours for assistance.   If a Mammogram was ordered for you at the [facility] call [phone] to schedule or change your appointment.  If you had an XRay/CT/Ultrasound/MRI ordered the number is [phone] to schedule or change your radiology appointment.   [facility] has limited ultrasound appointments available on Wednesdays, if you would like your ultrasound at [facility], please call [phone] to schedule.   Medical Concerns:  If you have urgent medical concerns please call [phone] at any time of the day.    Dafne Arechiga, DO

## 2023-02-15 ENCOUNTER — TELEPHONE (OUTPATIENT)
Dept: FAMILY MEDICINE | Facility: CLINIC | Age: 63
End: 2023-02-15

## 2023-03-17 ENCOUNTER — OFFICE VISIT (OUTPATIENT)
Dept: FAMILY MEDICINE | Facility: CLINIC | Age: 63
End: 2023-03-17
Payer: COMMERCIAL

## 2023-03-17 VITALS
HEART RATE: 75 BPM | RESPIRATION RATE: 18 BRPM | SYSTOLIC BLOOD PRESSURE: 120 MMHG | WEIGHT: 169 LBS | BODY MASS INDEX: 29.95 KG/M2 | TEMPERATURE: 98.1 F | DIASTOLIC BLOOD PRESSURE: 81 MMHG | HEIGHT: 63 IN | OXYGEN SATURATION: 97 %

## 2023-03-17 DIAGNOSIS — M26.609 TMJ (TEMPOROMANDIBULAR JOINT SYNDROME): ICD-10-CM

## 2023-03-17 DIAGNOSIS — S83.242D TEAR OF MEDIAL MENISCUS OF LEFT KNEE, CURRENT, UNSPECIFIED TEAR TYPE, SUBSEQUENT ENCOUNTER: Primary | ICD-10-CM

## 2023-03-17 DIAGNOSIS — R10.11 RUQ ABDOMINAL PAIN: ICD-10-CM

## 2023-03-17 DIAGNOSIS — E55.9 VITAMIN D DEFICIENCY: ICD-10-CM

## 2023-03-17 DIAGNOSIS — Z91.041 HISTORY OF ALLERGY TO RADIOGRAPHIC CONTRAST MEDIA: ICD-10-CM

## 2023-03-17 PROBLEM — G89.29 CHRONIC PAIN OF LEFT KNEE: Status: ACTIVE | Noted: 2023-03-17

## 2023-03-17 PROBLEM — M25.562 CHRONIC PAIN OF LEFT KNEE: Status: ACTIVE | Noted: 2023-03-17

## 2023-03-17 LAB
ALBUMIN SERPL BCG-MCNC: 4 G/DL (ref 3.5–5.2)
ALP SERPL-CCNC: 83 U/L (ref 35–104)
ALT SERPL W P-5'-P-CCNC: 18 U/L (ref 10–35)
AST SERPL W P-5'-P-CCNC: 28 U/L (ref 10–35)
BASOPHILS # BLD AUTO: 0.1 10E3/UL (ref 0–0.2)
BASOPHILS NFR BLD AUTO: 1 %
BILIRUB DIRECT SERPL-MCNC: <0.2 MG/DL (ref 0–0.3)
BILIRUB SERPL-MCNC: 0.2 MG/DL
EOSINOPHIL # BLD AUTO: 0.1 10E3/UL (ref 0–0.7)
EOSINOPHIL NFR BLD AUTO: 2 %
ERYTHROCYTE [DISTWIDTH] IN BLOOD BY AUTOMATED COUNT: 12 % (ref 10–15)
HCT VFR BLD AUTO: 40.7 % (ref 35–47)
HGB BLD-MCNC: 12.7 G/DL (ref 11.7–15.7)
IMM GRANULOCYTES # BLD: 0 10E3/UL
IMM GRANULOCYTES NFR BLD: 0 %
LIPASE SERPL-CCNC: 30 U/L (ref 13–60)
LYMPHOCYTES # BLD AUTO: 2.5 10E3/UL (ref 0.8–5.3)
LYMPHOCYTES NFR BLD AUTO: 47 %
MCH RBC QN AUTO: 29.5 PG (ref 26.5–33)
MCHC RBC AUTO-ENTMCNC: 31.2 G/DL (ref 31.5–36.5)
MCV RBC AUTO: 95 FL (ref 78–100)
MONOCYTES # BLD AUTO: 0.5 10E3/UL (ref 0–1.3)
MONOCYTES NFR BLD AUTO: 8 %
NEUTROPHILS # BLD AUTO: 2.3 10E3/UL (ref 1.6–8.3)
NEUTROPHILS NFR BLD AUTO: 42 %
NRBC # BLD AUTO: 0 10E3/UL
NRBC BLD AUTO-RTO: 0 /100
PLATELET # BLD AUTO: 251 10E3/UL (ref 150–450)
PROT SERPL-MCNC: 7.8 G/DL (ref 6.4–8.3)
RBC # BLD AUTO: 4.3 10E6/UL (ref 3.8–5.2)
WBC # BLD AUTO: 5.5 10E3/UL (ref 4–11)

## 2023-03-17 PROCEDURE — 36415 COLL VENOUS BLD VENIPUNCTURE: CPT | Performed by: FAMILY MEDICINE

## 2023-03-17 PROCEDURE — 80076 HEPATIC FUNCTION PANEL: CPT | Performed by: FAMILY MEDICINE

## 2023-03-17 PROCEDURE — 83690 ASSAY OF LIPASE: CPT | Performed by: FAMILY MEDICINE

## 2023-03-17 PROCEDURE — 98925 OSTEOPATH MANJ 1-2 REGIONS: CPT | Performed by: FAMILY MEDICINE

## 2023-03-17 PROCEDURE — 99215 OFFICE O/P EST HI 40 MIN: CPT | Mod: 25 | Performed by: FAMILY MEDICINE

## 2023-03-17 PROCEDURE — 85025 COMPLETE CBC W/AUTO DIFF WBC: CPT | Performed by: FAMILY MEDICINE

## 2023-03-17 RX ORDER — DIPHENHYDRAMINE HCL 25 MG
25 CAPSULE ORAL EVERY 6 HOURS PRN
Qty: 4 CAPSULE | Refills: 0 | Status: SHIPPED | OUTPATIENT
Start: 2023-03-17 | End: 2023-06-26

## 2023-03-17 RX ORDER — ACETAMINOPHEN 500 MG
1000 TABLET ORAL EVERY 8 HOURS PRN
Qty: 100 TABLET | Refills: 11 | Status: SHIPPED | OUTPATIENT
Start: 2023-03-17 | End: 2023-12-11

## 2023-03-17 RX ORDER — METHYLPREDNISOLONE 16 MG/1
16 TABLET ORAL DAILY
Qty: 1 TABLET | Refills: 0 | Status: SHIPPED | OUTPATIENT
Start: 2023-03-17 | End: 2023-06-26

## 2023-03-17 RX ORDER — CHOLECALCIFEROL (VITAMIN D3) 50 MCG
50 TABLET ORAL DAILY
Qty: 100 TABLET | Refills: 3 | Status: SHIPPED | OUTPATIENT
Start: 2023-03-17 | End: 2023-06-26

## 2023-03-17 RX ORDER — MELOXICAM 7.5 MG/1
7.5 TABLET ORAL DAILY
Qty: 30 TABLET | Refills: 2 | Status: SHIPPED | OUTPATIENT
Start: 2023-03-17 | End: 2023-12-11

## 2023-03-17 NOTE — PATIENT INSTRUCTIONS
Referred for physical therapy.    2.   Continue to avoid kneeling.    3.   Medication refilled. Use tylenol 3X times a day and Mobic 1X a day as needed.    4.   CT Abdomen scheduled, prescribed steroid to use 12 hours before examination and Benadryl to use 2 hours-30 minutes prior to scan. Okay to wait until after Ramadan for this.     5.  Labs done today    6. Referred to orofacial pain clinic.

## 2023-03-17 NOTE — PROGRESS NOTES
Assessment & Plan     Tear of medial meniscus of left knee, current, unspecified tear type, subsequent encounter  Ongoing problem that was identified last summer with MRI results which are in problem list overview. She declined surgical opinion at time and continues to do so today, would need to be much worse before proceeding with surgical referral. Will go to PT to manage pain, but discussed that this may not be helpful for mechanical symptoms which are the primary complaint. Recommend avoiding kneeling with prayer as she is already doing and follow-up PRN.   - Physical Therapy Referral  - meloxicam (MOBIC) 7.5 MG tablet  Dispense: 30 tablet; Refill: 2    Vitamin D deficiency  - vitamin D3 (CHOLECALCIFEROL) 50 mcg (2000 units) tablet  Dispense: 100 tablet; Refill: 3    RUQ abdominal pain,History of allergy to radiographic contrast media  Since 2018 cholecystitis and cholecystectomy with ongoing pain since. We investigated a year ago with normal returning US. In last few months she has developed more significant pain while sitting and increased tenderness on exam without rebound or other concerning features. Decided to proceed to CT scan and also repeat labs. May wait for CT until after Ramadan but encouraged if pain or symptoms are worse she should proceed with breaking fast. History of contrast allergy so smart set used to order medrol 12 hours before and Benadryl 2 hours with enough Benadryl to repeat if necessary. May need surgical vs GI referral because differential includes bile duct issues or pancreatic duct problems.   - Hepatic panel  - CBC with Diff Plt ([facility])  - Lipase  - CT Abdomen w Contrast  - methylPREDNISolone (MEDROL) 16 MG tablet  Dispense: 1 tablet; Refill: 0  - diphenhydrAMINE (BENADRYL ALLERGY) 25 MG capsule  Dispense: 4 capsule; Refill: 0    TMJ (temporomandibular joint syndrome)  Pain throughout head in distribution of temporalis muscle and localized to left TMJ with obvious clicking  Detail Level: Simple Biopsy Photograph Reviewed: Yes and pterygoid tenderness though pain was not improved with OMT so would not necessarily recommend. Referred to Orofacial clinic at the . May recommend PT for jaw. Recommend avoidance of hard foods  - Other Specialty Referral       Given that this has been interfering with the patient's quality of life and ADLs, after discussion and informed consent, we have together decided to address this concern with Osteopathic Manipulative Treatment.           OMT PROCEDURE NOTE    Body Region: Head, Face, and/or Jaw  Somatic Dysfunction: Tissue Texture Change, Asymmetry , Tenderness    Treatment: Direct Myofascial Release Techniques.  Outcome: Stable    Arleth Phelps MD            47 minutes spent on the date of the encounter doing chart review, history and exam, documentation and further activities per the note     No follow-ups on file.  The information in this document, created by the medical scribe for me, accurately reflects the services I personally performed and the decisions made by me. I have reviewed and approved this document for accuracy prior to leaving the patient care area.  Arleth Phelps MD  4:17 PM, 03/17/23  [facility] DARIAN White is a 63 year old accompanied by her , presenting for the following health issues:  Knee Pain (Patient having pain in knee and side of the head; if she touches right side, it's sore, but pain is on left; has been having pain since February)      HPI   Knee  Patient complains of on an off knee pain, including locking. She prays using a chair as kneeling on the ground is too painful. She feels some instability and worries about falling. She is typically able to walk this off which relieves the pain. Pain is typically on the front, on the knee cap, and has been occurring since the new year and notes she had some swelling of her knee in July of 2022.     Patient has taken anti-inflammatory medication as prescribed.     Abdominal pain  Patient  "reports that abdominal tenderness persists while sitting, with even light palpation eliciting pain. She attests to having normal bowel movements and nothing else out of the ordinary.     Head pain  Patient reports left side head pain that extends from the back of the head to the ear. This pain is more acute when she is chewing, in which she can hear sounds.   Review of Systems   Positive for: Knee pain, locking, instability, abdominal pain, head pain  Negative for:    This document serves as a record of the services and decisions personally performed and made by Arleth Phelps MD. It was created on his/her behalf by Woodrow Bojorquez, a trained medical scribe. The creation of this document is based the provider's statements to the medical scribe.  Scribe Woodrow Bojorquez 4:17 PM, March 17, 2023        Objective    /81   Pulse 75   Temp 98.1  F (36.7  C) (Oral)   Resp 18   Ht 1.61 m (5' 3.39\")   Wt 76.7 kg (169 lb)   LMP  (LMP Unknown)   SpO2 97%   BMI 29.57 kg/m    Body mass index is 29.57 kg/m .  Physical Exam   GENERAL: healthy, alert and no distress  HENT: TMJ with clicking on left side  ABDOMEN: Soft. Right upper quadrant tenderness. No rebound no guarding   MS: Left knee, Medial compartment tender at joint line. Distal femoral condyle tender, no joint effusion. Pre patellar compartment non swollen or tender. popliteal fossa non-tender to palpation. Flexion and extension are full. Lucia positive on medial aspect.  PSYCH: mentation appears normal, affect normal/bright              " Size Of Lesion In Cm: 0.8 Size Of Lesion After Curettage: 0 Add Intralesional Injection: No Total Volume (Ccs): 1 Anesthesia Type: 1% lidocaine with epinephrine Cautery Type: electrodesiccation Number Of Curettages: 3 What Was Performed First?: Curettage Additional Information: (Optional): The wound was cleaned, and a pressure dressing was applied.  The patient received detailed post-op instructions. Consent was obtained from the patient. The risks, benefits and alternatives to therapy were discussed in detail. Specifically, the risks of infection, scarring, bleeding, prolonged wound healing, nerve injury, incomplete removal, allergy to anesthesia and recurrence were addressed. Alternatives to ED&C, such as: surgical removal and XRT were also discussed.  Prior to the procedure, the treatment site was clearly identified and confirmed by the patient. All components of Universal Protocol/PAUSE Rule completed. Post-Care Instructions: I reviewed with the patient in detail post-care instructions. Patient is to keep the area dry for 48 hours, and not to engage in any swimming until the area is healed. Should the patient develop any fevers, chills, bleeding, severe pain patient will contact the office immediately. Bill As A Line Item Or As Units: Line Item

## 2023-03-17 NOTE — LETTER
March 20, 2023      Cindy Espinoza  [address] [place] [address]   [facility] [id number]        Dear ,    We are writing to inform you of your test results. Your results were all normal, so I think getting the CT scan will help determine what is happening with your abdominal pain.         Resulted Orders   Hepatic panel   Result Value Ref Range    Protein Total 7.8 6.4 - 8.3 g/dL    Albumin 4.0 3.5 - 5.2 g/dL    Bilirubin Total 0.2 <=1.2 mg/dL    Alkaline Phosphatase 83 35 - 104 U/L    AST 28 10 - 35 U/L    ALT 18 10 - 35 U/L    Bilirubin Direct <0.20 0.00 - 0.30 mg/dL   Lipase   Result Value Ref Range    Lipase 30 13 - 60 U/L   CBC with platelets and differential   Result Value Ref Range    WBC Count 5.5 4.0 - 11.0 10e3/uL    RBC Count 4.30 3.80 - 5.20 10e6/uL    Hemoglobin 12.7 11.7 - 15.7 g/dL    Hematocrit 40.7 35.0 - 47.0 %    MCV 95 78 - 100 fL    MCH 29.5 26.5 - 33.0 pg    MCHC 31.2 (L) 31.5 - 36.5 g/dL    RDW 12.0 10.0 - 15.0 %    Platelet Count 251 150 - 450 10e3/uL    % Neutrophils 42 %    % Lymphocytes 47 %    % Monocytes 8 %    % Eosinophils 2 %    % Basophils 1 %    % Immature Granulocytes 0 %    NRBCs per 100 WBC 0 <1 /100    Absolute Neutrophils 2.3 1.6 - 8.3 10e3/uL    Absolute Lymphocytes 2.5 0.8 - 5.3 10e3/uL    Absolute Monocytes 0.5 0.0 - 1.3 10e3/uL    Absolute Eosinophils 0.1 0.0 - 0.7 10e3/uL    Absolute Basophils 0.1 0.0 - 0.2 10e3/uL    Absolute Immature Granulocytes 0.0 <=0.4 10e3/uL    Absolute NRBCs 0.0 10e3/uL       If you have any questions or concerns, please call the clinic at the number listed above.       Sincerely,      Arleth Phelps MD

## 2023-03-20 ENCOUNTER — APPOINTMENT (OUTPATIENT)
Dept: INTERPRETER SERVICES | Facility: CLINIC | Age: 63
End: 2023-03-20
Payer: COMMERCIAL

## 2023-04-27 ENCOUNTER — OFFICE VISIT (OUTPATIENT)
Dept: OPHTHALMOLOGY | Facility: CLINIC | Age: 63
End: 2023-04-27
Payer: COMMERCIAL

## 2023-04-27 DIAGNOSIS — H25.013 CORTICAL SENILE CATARACT OF BOTH EYES: Primary | ICD-10-CM

## 2023-04-27 DIAGNOSIS — H40.1130 PRIMARY OPEN ANGLE GLAUCOMA OF BOTH EYES, UNSPECIFIED GLAUCOMA STAGE: ICD-10-CM

## 2023-04-27 PROCEDURE — 92004 COMPRE OPH EXAM NEW PT 1/>: CPT | Performed by: OPTOMETRIST

## 2023-04-27 ASSESSMENT — CONF VISUAL FIELD
METHOD: COUNTING FINGERS
OD_SUPERIOR_NASAL_RESTRICTION: 0
OD_INFERIOR_TEMPORAL_RESTRICTION: 0
OS_NORMAL: 1
OS_SUPERIOR_NASAL_RESTRICTION: 0
OS_INFERIOR_TEMPORAL_RESTRICTION: 0
OD_INFERIOR_NASAL_RESTRICTION: 0
OD_SUPERIOR_TEMPORAL_RESTRICTION: 0
OD_NORMAL: 1
OS_SUPERIOR_TEMPORAL_RESTRICTION: 0
OS_INFERIOR_NASAL_RESTRICTION: 0

## 2023-04-27 ASSESSMENT — EXTERNAL EXAM - RIGHT EYE: OD_EXAM: NORMAL

## 2023-04-27 ASSESSMENT — REFRACTION_MANIFEST
OS_AXIS: 076
OD_CYLINDER: +1.25
OS_SPHERE: -3.75
OS_CYLINDER: +2.00
OD_SPHERE: -2.00
OD_ADD: +2.50
OD_AXIS: 109
OS_ADD: +2.50

## 2023-04-27 ASSESSMENT — VISUAL ACUITY
OD_SC: 20/70
OS_PH_SC+: -2
OS_SC: 20/125
OD_PH_SC: 20/30
OD_SC+: 2
OS_PH_SC: 20/40
METHOD: SNELLEN - LINEAR

## 2023-04-27 ASSESSMENT — SLIT LAMP EXAM - LIDS
COMMENTS: INCOMPLETE BLINK
COMMENTS: NORMAL

## 2023-04-27 ASSESSMENT — TONOMETRY
OD_IOP_MMHG: 10
OS_IOP_MMHG: 10
IOP_METHOD: ICARE

## 2023-04-27 ASSESSMENT — EXTERNAL EXAM - LEFT EYE: OS_EXAM: NORMAL

## 2023-04-27 ASSESSMENT — CUP TO DISC RATIO
OS_RATIO: 0.5
OD_RATIO: 0.25

## 2023-04-27 NOTE — NURSING NOTE
Chief Complaints and History of Present Illnesses   Patient presents with     Annual Eye Exam     Chief Complaint(s) and History of Present Illness(es)     Annual Eye Exam            Laterality: both eyes    Associated symptoms: dryness.  Negative for eye pain, redness, flashes and floaters    Treatments tried: eye drops and artificial tears    Pain scale: 0/10          Comments    Patient present for routine exam. Patient states her last eye exam was last year. Patient did not bring glasses with her. Patient states she has glaucoma and uses drops.   USAMA Hunt April 27, 2023 2:51 PM

## 2023-04-27 NOTE — PROGRESS NOTES
History  HPI     Annual Eye Exam    In both eyes.  Associated symptoms include dryness.  Negative for eye pain, redness, flashes and floaters.  Treatments tried include eye drops and artificial tears.  Pain was noted as 0/10.           Comments    Patient present for routine exam. Patient states her last eye exam was last year. Patient did not bring glasses with her. Patient states she has glaucoma and uses drops.   USAMA Hunt April 27, 2023 2:51 PM           Last edited by Meredith Lacy on 4/27/2023  2:51 PM.          Assessment/Plan  (H25.013) Cortical senile cataract of both eyes  (primary encounter diagnosis)  Comment: Visually significant both eyes   Plan:  Educated patient on clinical findings. At the completion of the exam, the patient clarified that she was here today for a second opinion. A previous optometrist had discussed cataract surgery with her, but she wanted a second opinion before moving forward. Offered referral for cataract consultation, but the patient is unsure at this time how she will proceed. She will contact the clinic if interested in further treatment.    (H40.8645) Primary open angle glaucoma of both eyes, unspecified glaucoma stage  Comment: Treated elsewhere, requested refill  Plan: latanoprostene bunod (VYZULTA) 0.024 % SOLN ophthalmic solution         Continue use of Vyzulta at bedtime both eyes.    Complete documentation of historical and exam elements from today's encounter can  be found in the full encounter summary report (not reduplicated in this progress  note). I personally obtained the chief complaint(s) and history of present illness. I  confirmed and edited as necessary the review of systems, past medical/surgical  history, family history, social history, and examination findings as documented by  others; and I examined the patient myself. I personally reviewed the relevant tests,  images, and reports as documented above. I formulated and edited as necessary  the  assessment and plan and discussed the findings and management plan with the  patient and family.    Antwon Edwards OD, FAAO

## 2023-04-28 ENCOUNTER — TELEPHONE (OUTPATIENT)
Dept: FAMILY MEDICINE | Facility: CLINIC | Age: 63
End: 2023-04-28
Payer: COMMERCIAL

## 2023-04-28 DIAGNOSIS — Z91.041 CONTRAST MEDIA ALLERGY: Primary | ICD-10-CM

## 2023-04-28 RX ORDER — METHYLPREDNISOLONE 32 MG/1
32 TABLET ORAL DAILY
Qty: 1 TABLET | Refills: 0 | Status: SHIPPED | OUTPATIENT
Start: 2023-04-28 | End: 2023-06-26

## 2023-04-28 RX ORDER — DIPHENHYDRAMINE HCL 25 MG
25 CAPSULE ORAL EVERY 6 HOURS PRN
Qty: 5 CAPSULE | Refills: 0 | Status: SHIPPED | OUTPATIENT
Start: 2023-04-28 | End: 2023-06-26

## 2023-04-28 NOTE — TELEPHONE ENCOUNTER
Call patient to clarify pharmacy per provider request, patient did not answer, left message to call back.  Looks like most recent scripts sent to [facility], recommended that provider send scripts there, called patient with  and let her know of the scripts and how to take them.    She expressed understanding    Shannon Johnston RN

## 2023-04-28 NOTE — CONFIDENTIAL NOTE
RN spoke to the patient and relay the message below,patient brought the medications that she is taking prior the contrast and instruct the way she should use well understood .    [facility] phone call message- general phone call:     Reason for call: Melissa from [facility] called because she is working with Cindy and they have a CT scheduled for Monday, but they are seeing that Cindy is allergenic to the IV Contrast dye and so she will need to be medicated before her visit with them (Monday). No  needed when speaking with Melissa.      Return call needed: Yes     OK to leave a message on voice mail? Yes     Primary language: [facility]      needed? Yes     Call taken on April 28, 2023 at 11:58 AM by Jt Rahman RN

## 2023-04-28 NOTE — TELEPHONE ENCOUNTER
[facility] phone call message- general phone call:    Reason for call: Melissa from [facility] called because she is working with Cindy and they have a CT scheduled for Monday, but they are seeing that Cindy is allergenic to the IV Contrast dye and so she will need to be medicated before her visit with them (Monday). No  needed when speaking with Melissa.     Return call needed: Yes    OK to leave a message on voice mail? Yes    Primary language: Tristanian      needed? Yes    Call taken on April 28, 2023 at 11:58 AM by Jt Anthony

## 2023-04-28 NOTE — TELEPHONE ENCOUNTER
RN spoke to the patient and relay the message below,patient brought the medications that she is taking prior the contrast and instruct the way she should use well understood .    [facility] phone call message- general phone call:     Reason for call: Melissa from [facility] called because she is working with Cindy and they have a CT scheduled for Monday, but they are seeing that Cindy is allergenic to the IV Contrast dye and so she will need to be medicated before her visit with them (Monday). No  needed when speaking with Melissa.      Return call needed: Yes     OK to leave a message on voice mail? Yes     Primary language: [facility]      needed? Yes     Call taken on April 28, 2023 at 11:58 AM by Jt Rahman RN

## 2023-04-28 NOTE — TELEPHONE ENCOUNTER
Chart reviewed. Per system protocol will send oral dexamethasone to take 12h prior to CT with IV contrast and will send benadryl to take 1h prior to IV contrast and have on hand as needed if rash or itching develops.    Patient

## 2023-05-01 DIAGNOSIS — Z91.041 CONTRAST MEDIA ALLERGY: Primary | ICD-10-CM

## 2023-05-01 RX ORDER — METHYLPREDNISOLONE 32 MG/1
32 TABLET ORAL DAILY
Qty: 2 TABLET | Refills: 0 | Status: SHIPPED | OUTPATIENT
Start: 2023-05-01 | End: 2023-06-26

## 2023-05-01 NOTE — PROGRESS NOTES
Pt presented to [facility] today. Unable to complete CT as their protocol for contrast allergy is 2 doses of 32mgMedrol  - 12 and 2 hours pre-procedure.     CT rescheduled for tomorrow. Reviewed policy in [facility]. New rx sent to [facility] pharmacy.     Donna Pryor DO

## 2023-05-03 ENCOUNTER — TELEPHONE (OUTPATIENT)
Dept: OPHTHALMOLOGY | Facility: CLINIC | Age: 63
End: 2023-05-03
Payer: COMMERCIAL

## 2023-05-03 NOTE — TELEPHONE ENCOUNTER
Spoke with patent regarding referral and scheduling for a Cataract consultation-Per . Patient declined scheduling at this time and has Eye Clinic number if and when she is interested in scheduling at a later date. -Per Patient via

## 2023-05-09 ENCOUNTER — TELEPHONE (OUTPATIENT)
Dept: FAMILY MEDICINE | Facility: CLINIC | Age: 63
End: 2023-05-09

## 2023-05-09 ENCOUNTER — ANCILLARY PROCEDURE (OUTPATIENT)
Dept: CT IMAGING | Facility: CLINIC | Age: 63
End: 2023-05-09
Attending: FAMILY MEDICINE
Payer: COMMERCIAL

## 2023-05-09 DIAGNOSIS — R10.11 RUQ ABDOMINAL PAIN: ICD-10-CM

## 2023-05-09 PROCEDURE — 74177 CT ABD & PELVIS W/CONTRAST: CPT | Mod: GC | Performed by: RADIOLOGY

## 2023-05-09 RX ORDER — IOPAMIDOL 755 MG/ML
94 INJECTION, SOLUTION INTRAVASCULAR ONCE
Status: COMPLETED | OUTPATIENT
Start: 2023-05-09 | End: 2023-05-09

## 2023-05-09 RX ADMIN — IOPAMIDOL 94 ML: 755 INJECTION, SOLUTION INTRAVASCULAR at 16:47

## 2023-05-09 NOTE — TELEPHONE ENCOUNTER
Patient call and requested to make an appointment with Chidi doshi her and her son  On same day 6/16/2023 at 4:00pm for her son and 4:20 for her self .      Ama Rahman RN     Render In Strict Bullet Format?: No Initiate Treatment: Jublia QHS for 6-12 months (has at home) Detail Level: Zone Samples Given: Aquaphor

## 2023-05-12 ENCOUNTER — TELEPHONE (OUTPATIENT)
Dept: FAMILY MEDICINE | Facility: CLINIC | Age: 63
End: 2023-05-12
Payer: COMMERCIAL

## 2023-05-18 ENCOUNTER — TELEPHONE (OUTPATIENT)
Dept: FAMILY MEDICINE | Facility: CLINIC | Age: 63
End: 2023-05-18
Payer: COMMERCIAL

## 2023-05-18 DIAGNOSIS — H04.123 DRY EYES: Primary | ICD-10-CM

## 2023-05-18 NOTE — TELEPHONE ENCOUNTER
Covering for Link.  Apologies, will need more info since it's on the med list as patient reported.   1. Right, left or both eyes?   2. How many times a day?  3. Which pharmacy    Jere Pham, DO

## 2023-05-18 NOTE — TELEPHONE ENCOUNTER
"Patient requesting refill of eye drops that are reported by patient     Request for medication refill:  XIIDRA 5 % opthalmic solution  Providers if patient needs an appointment and you are willing to give a one month supply please refill for one month and  send a letter/MyChart using \".SMILLIMITEDREFILL\" .smillimited and route chart to \"P SMI \" (Giving one month refill in non controlled medications is strongly recommended before denial)    If refill has been denied, meaning absolutely no refills without visit, please complete the smart phrase \".smirxrefuse\" and route it to the \"P SMI MED REFILLS\"  pool to inform the patient and the pharmacy.    Shannon Johnston RN        "

## 2023-05-18 NOTE — TELEPHONE ENCOUNTER

## 2023-05-26 ENCOUNTER — TELEPHONE (OUTPATIENT)
Dept: FAMILY MEDICINE | Facility: CLINIC | Age: 63
End: 2023-05-26
Payer: COMMERCIAL

## 2023-05-26 DIAGNOSIS — H04.123 DRY EYES: Primary | ICD-10-CM

## 2023-05-26 NOTE — TELEPHONE ENCOUNTER
Prior Authorization Retail Medication Request    Medication/Dose: XIIDRA 5 % opthalmic solution  ICD code (if different than what is on RX):    Previously Tried and Failed:    Rationale:      Insurance Name: [employer] [facility]   Insurance ID:  [id number]      Pharmacy Information (if different than what is on RX)  Name:  [facility]  Phone:  [phone]

## 2023-06-02 NOTE — TELEPHONE ENCOUNTER
PRIOR AUTHORIZATION DENIED    Medication: XIIDRA 5 % OP SOLN  Insurance Company: [employer] - Phone [phone] Fax [phone]  Denial Date:    Denial Rational:   Appeal Information:   Patient Notified: Yes

## 2023-06-07 RX ORDER — CYCLOSPORINE 0.5 MG/ML
1 EMULSION OPHTHALMIC 2 TIMES DAILY PRN
Qty: 5.5 ML | Refills: 11 | Status: SHIPPED | OUTPATIENT
Start: 2023-06-07 | End: 2023-06-26

## 2023-06-14 ENCOUNTER — OFFICE VISIT (OUTPATIENT)
Dept: ENDOCRINOLOGY | Facility: CLINIC | Age: 63
End: 2023-06-14
Payer: COMMERCIAL

## 2023-06-14 VITALS
BODY MASS INDEX: 29.4 KG/M2 | HEART RATE: 65 BPM | OXYGEN SATURATION: 96 % | WEIGHT: 168 LBS | DIASTOLIC BLOOD PRESSURE: 82 MMHG | SYSTOLIC BLOOD PRESSURE: 125 MMHG

## 2023-06-14 DIAGNOSIS — E21.3 HYPERPARATHYROIDISM (H): ICD-10-CM

## 2023-06-14 DIAGNOSIS — M81.0 AGE-RELATED OSTEOPOROSIS WITHOUT CURRENT PATHOLOGICAL FRACTURE: Primary | ICD-10-CM

## 2023-06-14 PROCEDURE — 99215 OFFICE O/P EST HI 40 MIN: CPT | Performed by: STUDENT IN AN ORGANIZED HEALTH CARE EDUCATION/TRAINING PROGRAM

## 2023-06-14 RX ORDER — ACETAMINOPHEN 325 MG/1
650 TABLET ORAL ONCE
Status: CANCELLED | OUTPATIENT
Start: 2023-06-14

## 2023-06-14 RX ORDER — NALOXONE HYDROCHLORIDE 0.4 MG/ML
0.2 INJECTION, SOLUTION INTRAMUSCULAR; INTRAVENOUS; SUBCUTANEOUS
Status: CANCELLED | OUTPATIENT
Start: 2023-06-14

## 2023-06-14 RX ORDER — DIPHENHYDRAMINE HYDROCHLORIDE 50 MG/ML
50 INJECTION INTRAMUSCULAR; INTRAVENOUS
Status: CANCELLED
Start: 2023-06-14

## 2023-06-14 RX ORDER — HEPARIN SODIUM (PORCINE) LOCK FLUSH IV SOLN 100 UNIT/ML 100 UNIT/ML
5 SOLUTION INTRAVENOUS
Status: CANCELLED | OUTPATIENT
Start: 2023-06-14

## 2023-06-14 RX ORDER — ALBUTEROL SULFATE 90 UG/1
1-2 AEROSOL, METERED RESPIRATORY (INHALATION)
Status: CANCELLED
Start: 2023-06-14

## 2023-06-14 RX ORDER — HEPARIN SODIUM,PORCINE 10 UNIT/ML
5 VIAL (ML) INTRAVENOUS
Status: CANCELLED | OUTPATIENT
Start: 2023-06-14

## 2023-06-14 RX ORDER — ALBUTEROL SULFATE 0.83 MG/ML
2.5 SOLUTION RESPIRATORY (INHALATION)
Status: CANCELLED | OUTPATIENT
Start: 2023-06-14

## 2023-06-14 RX ORDER — ZOLEDRONIC ACID 5 MG/100ML
5 INJECTION, SOLUTION INTRAVENOUS ONCE
Status: CANCELLED
Start: 2023-06-14

## 2023-06-14 RX ORDER — METHYLPREDNISOLONE SODIUM SUCCINATE 125 MG/2ML
125 INJECTION, POWDER, LYOPHILIZED, FOR SOLUTION INTRAMUSCULAR; INTRAVENOUS
Status: CANCELLED
Start: 2023-06-14

## 2023-06-14 RX ORDER — MEPERIDINE HYDROCHLORIDE 25 MG/ML
25 INJECTION INTRAMUSCULAR; INTRAVENOUS; SUBCUTANEOUS EVERY 30 MIN PRN
Status: CANCELLED | OUTPATIENT
Start: 2023-06-14

## 2023-06-14 RX ORDER — EPINEPHRINE 1 MG/ML
0.3 INJECTION, SOLUTION, CONCENTRATE INTRAVENOUS EVERY 5 MIN PRN
Status: CANCELLED | OUTPATIENT
Start: 2023-06-14

## 2023-06-14 ASSESSMENT — PAIN SCALES - GENERAL: PAINLEVEL: NO PAIN (0)

## 2023-06-14 NOTE — NURSING NOTE
"Chief Complaint   Patient presents with     Thyroid Problem   Vital signs:      BP: 125/82 Pulse: 65     SpO2: 96 %       Weight: 76.2 kg (168 lb)  Estimated body mass index is 29.4 kg/m  as calculated from the following:    Height as of 3/17/23: 1.61 m (5' 3.39\").    Weight as of this encounter: 76.2 kg (168 lb).          "

## 2023-06-14 NOTE — LETTER
6/14/2023       RE: Cindy Espinoza  [address] [facility] [address]  [place] [address]     Dear Colleague,    Thank you for referring your patient, Cindy Espinoza, to the [facility] [place] [facility]. Please see a copy of my visit note below.    Endocrinology Clinic Visit     NAME:  Cindy Espinoza  PCP:  Arleth Phelps  MRN:  [id number]  Reason for Consult:  Hyperparathyroidism   Requesting Provider:  Referred Self    Chief Complaint     Chief Complaint   Patient presents with    Thyroid Problem       History of Present Illness     Cindy Espinoza is a 61 year old female who is seen in clinic for management of hyperparathyroidism and osteoporosis.  Last seen 6/2022    Her PMh is otherwise unremarkable and she is not on any chronic meds.     She has long hx of vitD deficiency dating back in our records to 2011 with normal calcium ( on the upper limit of normal) of 10.2 . She had elevated calcium since 2012 ranging between 10.3-10.6. she elevated PTH of 84,11,137 back in 2012 - 2015 in the setting of vit D defciency and mild hyperCa. On 10/2018 viit D corrected to 28 then 32 and calcium down to 9.5 but PTH was still at 98. Since 2018 calcium has been ranging 10.1-10.3 with PTH of 88-97. kast Vit D check 12/2020 was 33, Ca 10.2 and PTH 97. Most recent Ca 10.1 on 5/2021.  She has normal kidney function.  No history of kidney stones.  No family history of kidney stones or calcium abnormalities.  Osteoporosis history as below    She had her first DXA 2012 with lowest T -3 at the lumbar spine.  DXA 9/2018 showed a T score of -3.3 at the lumbar spine. She had a Dxa scan 11/23/22: lowest T score of -2.8 at the lumbar spine. Bone density compared to the prior study in 2018 has increased at the lumbar spine , and increased at the right total femur by +11.3% and +4.2% respectively.     Fosamax 70 mg weekly was started [id number] switched  to Reclast 10/2018 due to non-compliance (she reported difficulty remembering once a week pills).  She had her second IV Reclast infusion January 2022.  She tolerated the infusion well.  She denied any recent falls no bone fractures.  Today she reported chronic back pain, describes it as on the side and thinks it is muscular in type      Interval hx:  She did not get her IV reclast as scheduled on 1/2023  She is not on any calcium supplements but tries to get her calcium from diet with daily milk and yogurt intake.  She is on vitamin D 1000 IU daily.  She denied any falls or bone fracture.      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    Problem List     Patient Active Problem List   Diagnosis    Hypercalcemia    Menopause present    Fibromyalgia    Chronic tension headaches    Osteoporosis    TMJ (temporomandibular joint syndrome)    Abnormal Pap smear of cervix    Caregiver burden    Chronic bilateral low back pain with right-sided sciatica    Age-related osteoporosis without current pathological fracture    Hyperparathyroidism (H)    RUQ abdominal pain    Dry eyes    Decreased sense of vibration    Facial droop    Mixed hyperlipidemia    Need for shingles vaccine    Prepatellar bursitis of left knee    Tear of medial meniscus of left knee, current, unspecified tear type, subsequent encounter    Chronic pain of left knee    History of allergy to radiographic contrast media    Vitamin D deficiency        Medications     Current Outpatient Medications   Medication    acetaminophen (TYLENOL) 500 MG tablet    cyclobenzaprine (FLEXERIL) 5 MG tablet    cycloSPORINE (RESTASIS) 0.05 % ophthalmic emulsion    diphenhydrAMINE (BENADRYL ALLERGY) 25 MG capsule    diphenhydrAMINE (BENADRYL ALLERGY) 25 MG capsule    fluticasone (FLONASE) 50 MCG/ACT nasal spray    latanoprostene bunod (VYZULTA) 0.024 % SOLN ophthalmic solution    lifitegrast (XIIDRA) 5 % opthalmic solution     meloxicam (MOBIC) 7.5 MG tablet    methylPREDNISolone (MEDROL) 16 MG tablet    methylPREDNISolone (MEDROL) 32 MG tablet    methylPREDNISolone (MEDROL) 32 MG tablet    vitamin D3 (CHOLECALCIFEROL) 50 mcg (2000 units) tablet    VYZULTA 0.024 % SOLN ophthalmic solution    XIIDRA 5 % opthalmic solution     No current facility-administered medications for this visit.        Allergies     Allergies   Allergen Reactions    Contrast Dye Itching and Rash    Iodine Rash       Medical / Surgical History     Past Medical History:   Diagnosis Date    Ear pain     Left    Vitamin D deficiency      Past Surgical History:   Procedure Laterality Date    LAPAROSCOPIC CHOLECYSTECTOMY  2019    [facility]       Social History     Social History     Socioeconomic History    Marital status:      Spouse name: Not on file    Number of children: 2    Years of education: Not on file    Highest education level: Not on file   Occupational History    Occupation: had a [employer] in [employer]     Comment: Lives with children   Tobacco Use    Smoking status: Never     Passive exposure: Never    Smokeless tobacco: Never   Vaping Use    Vaping status: Not on file   Substance and Sexual Activity    Alcohol use: No    Drug use: No    Sexual activity: Not Currently   Other Topics Concern    Parent/sibling w/ CABG, MI or angioplasty before 65F 55M? Not Asked   Social History Narrative    Lives with adult son who has advanced XP and requires 24 hour care. Has another son Scarlet, helps with care of Os[place]do. Niece Elyse also helps family.    , spouse  in      Social Determinants of Health     Financial Resource Strain: Low Risk  (2021)    Overall Financial Resource Strain (CARDIA)     Difficulty of Paying Living Expenses: Not hard at all   Food Insecurity: No Food Insecurity (2021)    Hunger Vital Sign     Worried About Running Out of Food in the Last Year: Never true     Ran Out of Food in the Last Year: Never true    Transportation Needs: No Transportation Needs (4/2/2021)    PRAPARE - Transportation     Lack of Transportation (Medical): No     Lack of Transportation (Non-Medical): No   Physical Activity: Insufficiently Active (3/9/2022)    Exercise Vital Sign     Days of Exercise per Week: 7 days     Minutes of Exercise per Session: 10 min   Stress: No Stress Concern Present (4/2/2021)    [employer] - Occupational Stress Questionnaire     Feeling of Stress : Not at all   Social Connections: Unknown (4/2/2021)    Social Connection and Isolation Panel [NHANES]     Frequency of Communication with Friends and Family: Once a week     Frequency of Social Gatherings with Friends and Family: Never     Attends Sabianist Services: Never     Active Member of Clubs or Organizations: Not asked     Attends Club or Organization Meetings: Not asked     Marital Status: Not asked   Intimate Partner Violence: Not At Risk (4/2/2021)    Humiliation, Afraid, Rape, and Kick questionnaire     Fear of Current or Ex-Partner: No     Emotionally Abused: No     Physically Abused: No     Sexually Abused: No   Housing Stability: Not on file       Family History     Family History   Problem Relation Age of Onset    Other - See Comments Son         xeroderma pigmentosum    Diabetes Son     Pulmonary Embolism Son        ROS   12 ROS completed , pertinent negative and postive in HPI    Physical Exam   /82 (BP Location: Right arm, Patient Position: Sitting, Cuff Size: Adult Regular)   Pulse 65   Wt 76.2 kg (168 lb)   LMP  (LMP Unknown)   SpO2 96%   BMI 29.40 kg/m       General: Comfortable, no obvious distress, normal body habitus  Eyes: Sclera anicteric, moist conjunctiva  HENT: wearing a mask.  Neck: wearing ismael  CV: normal rate.   Resp:  good effort, no evidence of loud wheezing  Abdomen:  obese, non distended.   Skin: No rashes, lesions, or subcutaneous nodules on exposed skin.   Psych: Alert and oriented x 3.  Appropriate affect, good insight  Extremities: No peripheral edema  Musculoskeletal: Appropriate muscle bulk and strength  Neuro: Moves all four extremities. No focal deficits on limited exam.    Labs/Imaging     Pertinent Labs were reviewed and updated in [facility] and reviewed.  Radiology Results were  reviewed and updated in EPIC and reviewed.    Summary of recent findings:     TSH   Date Value Ref Range Status   12/28/2020 1.37 0.40 - 4.00 mU/L Final   04/12/2017 1.12 0.40 - 4.00 mU/L Final   05/07/2015 0.93 0.40 - 4.00 mU/L Final   09/02/2011 0.82 0.4 - 5.0 mU/L Final       Creatinine   Date Value Ref Range Status   09/09/2022 0.60 0.51 - 0.95 mg/dL Final   05/03/2021 0.63 0.52 - 1.04 mg/dL Final       No results found for: [id number], [id number], [id number]    Recent Labs   Lab Test 05/03/21  1709 12/28/20  1329 08/06/20  1649    140.4 137   POTASSIUM 3.9 3.6 3.9   CHLORIDE 106 100.8 106   CO2 27 25.2 26   ANIONGAP 4  --  5   GLC 95 103.8* 94   BUN 18 15.2 10   CR 0.63 0.6 0.54   AARON 10.1 10.2* 10.1       I personally reviewed the patient's outside records from [facility] EMR. Summary of pertinent findings in HPI.    Impression / Plan     1. Very mild Hypercalcemia.   2. Hyperparathyroidism  3. Hx of vitD deficiency  she was seen by Dr. Bernstein back in 2018. She saw Dr. Aguilar (Endocrine, [facility]) in 2012 and was diagnosed with primary hyperparathyroidism, and recommended parathyroidectomy due to osteoporosis. DXA scan then showed lowest T-score of -3.0 in the lumbar spine. Head and neck US then did not seem to localize any parathyroid adenoma. ptient was lost to f/up until 2015, when she saw Dr. Trent Bonilla (Endocrine). She was advised observation. NM parathyroid imaging 2018 negative.     Primary hyperparathyroidism versus secondary hyperparathyroidism due to vitamin D deficiency.  PTH remains elevated despite correcting vitamin D which makes primary hyperparathyroidism likely. Familial Hypocalciuric  Hypercalemia is on the differential given mild hypercalcemia and mild PTH elevation.  She had a urine spot for calcium over creatinine ratio within normal limits back in 2012.  She declined to do a 24-hour urine collection for calcium.    Again we discussed the below and patient declined ENT referral and is not interested in surgery.     Plan:  - continue appropriate calcium replacement  -Continue vitamin D supplementation  -Repeat calcium, vitamin D, phosphorus, creatinine, albumin PTH     I reviewed previously with her the latest NIH guidelines conclude that surgery for hyperparathyroidism is indicated in symptomatic patients, or in asymptomatic patients who meet any one of the following conditions:  Serum calcium concentration of 1.0 mg/dL (0.25 mmol/L) or more above the upper limit of normal   Creatinine clearance that is reduced to <60 mL/min   Bone density at the hip, lumbar spine, or distal radius that is more than 2.5 standard deviations below peak bone mass (T score <-2.5) and/or previous fragility fracture   Age less than 50 years  Operative intervention can be delayed in patients over 50 years of age who are asymptomatic or minimally symptomatic and who have serum calcium concentrations <1.0 mg/dL (0.2 mmol/L) above the upper limit of normal, and in patients who are medically unfit for surgery.        4.  Osteoporosis  Risk factors include age postmenopausal, vitamin D deficiency and hyperparathyroidism.  Significant improvement in her BMD seen on DEXA scan 11/2022.  She was on Fosamax with poor compliance 2014 through 2018.  She had 1st dose of IV Reclast in 2018, second dose January 2022.    Plan:  -Third dose IV Reclast was due on January 2023, we assisted her on scheduling IV reclast today.  -DEXA scan November 2023      Test and/or medications prescribed today:  Orders Placed This Encounter   Procedures    Dexa hip/pelvis/spine    Dexa Wrist/Heel    25 Hydroxyvitamin D2 and D3    Albumin level     Calcium    Magnesium    Parathyroid Hormone Intact    Phosphorus    Creatinine       Follow up 6 month    40 minutes spent on the date of the encounter doing chart review, history and exam, documentation and further activities as noted above.         Ivet Herrera MD  Endocrinology, Diabetes and Metabolism  [facility]

## 2023-06-14 NOTE — PROGRESS NOTES
Endocrinology Clinic Visit     NAME:  Cindy Espinoza  PCP:  Arleth Phelps  MRN:  [id number]  Reason for Consult:  Hyperparathyroidism   Requesting Provider:  Referred Self    Chief Complaint     Chief Complaint   Patient presents with     Thyroid Problem       History of Present Illness     Cindy Espinoza is a 61 year old female who is seen in clinic for management of hyperparathyroidism and osteoporosis.  Last seen 6/2022    Her PMh is otherwise unremarkable and she is not on any chronic meds.     She has long hx of vitD deficiency dating back in our records to 2011 with normal calcium ( on the upper limit of normal) of 10.2 . She had elevated calcium since 2012 ranging between 10.3-10.6. she elevated PTH of 84,11,137 back in 2012 - 2015 in the setting of vit D defciency and mild hyperCa. On 10/2018 viit D corrected to 28 then 32 and calcium down to 9.5 but PTH was still at 98. Since 2018 calcium has been ranging 10.1-10.3 with PTH of 88-97. kast Vit D check 12/2020 was 33, Ca 10.2 and PTH 97. Most recent Ca 10.1 on 5/2021.  She has normal kidney function.  No history of kidney stones.  No family history of kidney stones or calcium abnormalities.  Osteoporosis history as below    She had her first DXA 2012 with lowest T -3 at the lumbar spine.  DXA 9/2018 showed a T score of -3.3 at the lumbar spine. She had a Dxa scan 11/23/22: lowest T score of -2.8 at the lumbar spine. Bone density compared to the prior study in 2018 has increased at the lumbar spine , and increased at the right total femur by +11.3% and +4.2% respectively.     Fosamax 70 mg weekly was started [id number] switched to Reclast 10/2018 due to non-compliance (she reported difficulty remembering once a week pills).  She had her second IV Reclast infusion January 2022.  She tolerated the infusion well.  She denied any recent falls no bone fractures.  Today she reported chronic back pain, describes it as on the side and thinks it is muscular in  type      Interval hx:  She did not get her IV reclast as scheduled on 1/2023  She is not on any calcium supplements but tries to get her calcium from diet with daily milk and yogurt intake.  She is on vitamin D 1000 IU daily.  She denied any falls or bone fracture.      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    Problem List     Patient Active Problem List   Diagnosis     Hypercalcemia     Menopause present     Fibromyalgia     Chronic tension headaches     Osteoporosis     TMJ (temporomandibular joint syndrome)     Abnormal Pap smear of cervix     Caregiver burden     Chronic bilateral low back pain with right-sided sciatica     Age-related osteoporosis without current pathological fracture     Hyperparathyroidism (H)     RUQ abdominal pain     Dry eyes     Decreased sense of vibration     Facial droop     Mixed hyperlipidemia     Need for shingles vaccine     Prepatellar bursitis of left knee     Tear of medial meniscus of left knee, current, unspecified tear type, subsequent encounter     Chronic pain of left knee     History of allergy to radiographic contrast media     Vitamin D deficiency        Medications     Current Outpatient Medications   Medication     acetaminophen (TYLENOL) 500 MG tablet     cyclobenzaprine (FLEXERIL) 5 MG tablet     cycloSPORINE (RESTASIS) 0.05 % ophthalmic emulsion     diphenhydrAMINE (BENADRYL ALLERGY) 25 MG capsule     diphenhydrAMINE (BENADRYL ALLERGY) 25 MG capsule     fluticasone (FLONASE) 50 MCG/ACT nasal spray     latanoprostene bunod (VYZULTA) 0.024 % SOLN ophthalmic solution     lifitegrast (XIIDRA) 5 % opthalmic solution     meloxicam (MOBIC) 7.5 MG tablet     methylPREDNISolone (MEDROL) 16 MG tablet     methylPREDNISolone (MEDROL) 32 MG tablet     methylPREDNISolone (MEDROL) 32 MG tablet     vitamin D3 (CHOLECALCIFEROL) 50 mcg (2000 units) tablet     VYZULTA 0.024 % SOLN ophthalmic solution     XIIDRA 5 % opthalmic  solution     No current facility-administered medications for this visit.        Allergies     Allergies   Allergen Reactions     Contrast Dye Itching and Rash     Iodine Rash       Medical / Surgical History     Past Medical History:   Diagnosis Date     Ear pain     Left     Vitamin D deficiency      Past Surgical History:   Procedure Laterality Date     LAPAROSCOPIC CHOLECYSTECTOMY  2019    [facility]       Social History     Social History     Socioeconomic History     Marital status:      Spouse name: Not on file     Number of children: 2     Years of education: Not on file     Highest education level: Not on file   Occupational History     Occupation: had a [employer] - [employer] in [employer]     Comment: Lives with children   Tobacco Use     Smoking status: Never     Passive exposure: Never     Smokeless tobacco: Never   Vaping Use     Vaping status: Not on file   Substance and Sexual Activity     Alcohol use: No     Drug use: No     Sexual activity: Not Currently   Other Topics Concern     Parent/sibling w/ CABG, MI or angioplasty before 65F 55M? Not Asked   Social History Narrative    Lives with adult son who has advanced XP and requires 24 hour care. Has another son Scarlet, helps with care of Osbaldo. Niece Elyse also helps family.    , spouse  in      Social Determinants of Health     Financial Resource Strain: Low Risk  (2021)    Overall Financial Resource Strain (CARDIA)      Difficulty of Paying Living Expenses: Not hard at all   Food Insecurity: No Food Insecurity (2021)    Hunger Vital Sign      Worried About Running Out of Food in the Last Year: Never true      Ran Out of Food in the Last Year: Never true   Transportation Needs: No Transportation Needs (2021)    PRAPARE - Transportation      Lack of Transportation (Medical): No      Lack of Transportation (Non-Medical): No   Physical Activity: Insufficiently Active (3/9/2022)    Exercise Vital Sign      Days of Exercise per Week:  7 days      Minutes of Exercise per Session: 10 min   Stress: No Stress Concern Present (4/2/2021)    [employer] [place] [employer] - Occupational Stress Questionnaire      Feeling of Stress : Not at all   Social Connections: Unknown (4/2/2021)    Social Connection and Isolation Panel [NHANES]      Frequency of Communication with Friends and Family: Once a week      Frequency of Social Gatherings with Friends and Family: Never      Attends Spiritism Services: Never      Active Member of Clubs or Organizations: Not asked      Attends Club or Organization Meetings: Not asked      Marital Status: Not asked   Intimate Partner Violence: Not At Risk (4/2/2021)    Humiliation, Afraid, Rape, and Kick questionnaire      Fear of Current or Ex-Partner: No      Emotionally Abused: No      Physically Abused: No      Sexually Abused: No   Housing Stability: Not on file       Family History     Family History   Problem Relation Age of Onset     Other - See Comments Son         xeroderma pigmentosum     Diabetes Son      Pulmonary Embolism Son        ROS   12 ROS completed , pertinent negative and postive in HPI    Physical Exam   /82 (BP Location: Right arm, Patient Position: Sitting, Cuff Size: Adult Regular)   Pulse 65   Wt 76.2 kg (168 lb)   LMP  (LMP Unknown)   SpO2 96%   BMI 29.40 kg/m       General: Comfortable, no obvious distress, normal body habitus  Eyes: Sclera anicteric, moist conjunctiva  HENT: wearing a mask.  Neck: wearing ismael  CV: normal rate.   Resp:  good effort, no evidence of loud wheezing  Abdomen:  obese, non distended.   Skin: No rashes, lesions, or subcutaneous nodules on exposed skin.   Psych: Alert and oriented x 3. Appropriate affect, good insight  Extremities: No peripheral edema  Musculoskeletal: Appropriate muscle bulk and strength  Neuro: Moves all four extremities. No focal deficits on limited exam.    Labs/Imaging     Pertinent Labs were reviewed and updated in EPIC and  reviewed.  Radiology Results were  reviewed and updated in EPIC and reviewed.    Summary of recent findings:     TSH   Date Value Ref Range Status   12/28/2020 1.37 0.40 - 4.00 mU/L Final   04/12/2017 1.12 0.40 - 4.00 mU/L Final   05/07/2015 0.93 0.40 - 4.00 mU/L Final   09/02/2011 0.82 0.4 - 5.0 mU/L Final       Creatinine   Date Value Ref Range Status   09/09/2022 0.60 0.51 - 0.95 mg/dL Final   05/03/2021 0.63 0.52 - 1.04 mg/dL Final       No results found for: [id number], [id number], [id number]    Recent Labs   Lab Test 05/03/21  1709 12/28/20  1329 08/06/20  1649    140.4 137   POTASSIUM 3.9 3.6 3.9   CHLORIDE 106 100.8 106   CO2 27 25.2 26   ANIONGAP 4  --  5   GLC 95 103.8* 94   BUN 18 15.2 10   CR 0.63 0.6 0.54   AARON 10.1 10.2* 10.1       I personally reviewed the patient's outside records from [facility] EMR. Summary of pertinent findings in HPI.    Impression / Plan     1. Very mild Hypercalcemia.   2. Hyperparathyroidism  3. Hx of vitD deficiency  she was seen by Dr. Bernstein back in 2018. She saw Dr. Aguilar (Endocrine, [facility]) in 2012 and was diagnosed with primary hyperparathyroidism, and recommended parathyroidectomy due to osteoporosis. DXA scan then showed lowest T-score of -3.0 in the lumbar spine. Head and neck US then did not seem to localize any parathyroid adenoma. ptient was lost to f/up until 2015, when she saw Dr. Trent Bonilla (Endocrine). She was advised observation. NM parathyroid imaging 2018 negative.     Primary hyperparathyroidism versus secondary hyperparathyroidism due to vitamin D deficiency.  PTH remains elevated despite correcting vitamin D which makes primary hyperparathyroidism likely. Familial Hypocalciuric Hypercalemia is on the differential given mild hypercalcemia and mild PTH elevation.  She had a urine spot for calcium over creatinine ratio within normal limits back in 2012.  She declined to do a 24-hour urine collection for calcium.    Again we discussed the below and  patient declined ENT referral and is not interested in surgery.     Plan:  - continue appropriate calcium replacement  -Continue vitamin D supplementation  -Repeat calcium, vitamin D, phosphorus, creatinine, albumin PTH     I reviewed previously with her the latest NIH guidelines conclude that surgery for hyperparathyroidism is indicated in symptomatic patients, or in asymptomatic patients who meet any one of the following conditions:  Serum calcium concentration of 1.0 mg/dL (0.25 mmol/L) or more above the upper limit of normal   Creatinine clearance that is reduced to <60 mL/min   Bone density at the hip, lumbar spine, or distal radius that is more than 2.5 standard deviations below peak bone mass (T score <-2.5) and/or previous fragility fracture   Age less than 50 years  Operative intervention can be delayed in patients over 50 years of age who are asymptomatic or minimally symptomatic and who have serum calcium concentrations <1.0 mg/dL (0.2 mmol/L) above the upper limit of normal, and in patients who are medically unfit for surgery.        4.  Osteoporosis  Risk factors include age postmenopausal, vitamin D deficiency and hyperparathyroidism.  Significant improvement in her BMD seen on DEXA scan 11/2022.  She was on Fosamax with poor compliance 2014 through 2018.  She had 1st dose of IV Reclast in 2018, second dose January 2022.    Plan:  -Third dose IV Reclast was due on January 2023, we assisted her on scheduling IV reclast today.  -DEXA scan November 2023      Test and/or medications prescribed today:  Orders Placed This Encounter   Procedures     Dexa hip/pelvis/spine     Dexa Wrist/Heel     25 Hydroxyvitamin D2 and D3     Albumin level     Calcium     Magnesium     Parathyroid Hormone Intact     Phosphorus     Creatinine       Follow up 6 month    40 minutes spent on the date of the encounter doing chart review, history and exam, documentation and further activities as noted above.             Hind  MD Sharon  Endocrinology, Diabetes and Metabolism  [facility]

## 2023-06-16 ENCOUNTER — LAB (OUTPATIENT)
Dept: LAB | Facility: CLINIC | Age: 63
End: 2023-06-16
Payer: COMMERCIAL

## 2023-06-16 DIAGNOSIS — E21.3 HYPERPARATHYROIDISM (H): ICD-10-CM

## 2023-06-16 LAB
ALBUMIN SERPL BCG-MCNC: 3.9 G/DL (ref 3.5–5.2)
CALCIUM SERPL-MCNC: 10.4 MG/DL (ref 8.8–10.2)
CREAT SERPL-MCNC: 0.64 MG/DL (ref 0.51–0.95)
GFR SERPL CREATININE-BSD FRML MDRD: >90 ML/MIN/1.73M2
PHOSPHATE SERPL-MCNC: 2.8 MG/DL (ref 2.5–4.5)
PTH-INTACT SERPL-MCNC: 55 PG/ML (ref 15–65)

## 2023-06-16 PROCEDURE — 36415 COLL VENOUS BLD VENIPUNCTURE: CPT

## 2023-06-16 PROCEDURE — 82040 ASSAY OF SERUM ALBUMIN: CPT

## 2023-06-16 PROCEDURE — 82310 ASSAY OF CALCIUM: CPT

## 2023-06-16 PROCEDURE — 82306 VITAMIN D 25 HYDROXY: CPT

## 2023-06-16 PROCEDURE — 83970 ASSAY OF PARATHORMONE: CPT

## 2023-06-16 PROCEDURE — 83735 ASSAY OF MAGNESIUM: CPT

## 2023-06-16 PROCEDURE — 84100 ASSAY OF PHOSPHORUS: CPT

## 2023-06-16 PROCEDURE — 82565 ASSAY OF CREATININE: CPT

## 2023-06-17 LAB — MAGNESIUM SERPL-MCNC: 2.1 MG/DL (ref 1.7–2.3)

## 2023-06-21 LAB
DEPRECATED CALCIDIOL+CALCIFEROL SERPL-MC: <57 UG/L (ref 20–75)
VITAMIN D2 SERPL-MCNC: <5 UG/L
VITAMIN D3 SERPL-MCNC: 52 UG/L

## 2023-06-26 ENCOUNTER — OFFICE VISIT (OUTPATIENT)
Dept: FAMILY MEDICINE | Facility: CLINIC | Age: 63
End: 2023-06-26
Payer: COMMERCIAL

## 2023-06-26 VITALS
SYSTOLIC BLOOD PRESSURE: 120 MMHG | DIASTOLIC BLOOD PRESSURE: 85 MMHG | OXYGEN SATURATION: 98 % | RESPIRATION RATE: 16 BRPM | BODY MASS INDEX: 29.26 KG/M2 | HEART RATE: 69 BPM | WEIGHT: 167.2 LBS

## 2023-06-26 DIAGNOSIS — H04.123 DRY EYES: ICD-10-CM

## 2023-06-26 DIAGNOSIS — Z12.31 VISIT FOR SCREENING MAMMOGRAM: ICD-10-CM

## 2023-06-26 DIAGNOSIS — Z12.11 SCREEN FOR COLON CANCER: ICD-10-CM

## 2023-06-26 DIAGNOSIS — E55.9 VITAMIN D DEFICIENCY: ICD-10-CM

## 2023-06-26 DIAGNOSIS — E21.3 HYPERPARATHYROIDISM (H): Primary | ICD-10-CM

## 2023-06-26 PROCEDURE — 99214 OFFICE O/P EST MOD 30 MIN: CPT | Performed by: FAMILY MEDICINE

## 2023-06-26 RX ORDER — CHOLECALCIFEROL (VITAMIN D3) 50 MCG
50 TABLET ORAL DAILY
Qty: 100 TABLET | Refills: 3 | Status: SHIPPED | OUTPATIENT
Start: 2023-06-26 | End: 2023-12-11

## 2023-06-26 RX ORDER — LIFITEGRAST 50 MG/ML
1 SOLUTION/ DROPS OPHTHALMIC 2 TIMES DAILY
Qty: 60 EACH | Refills: 11 | Status: SHIPPED | OUTPATIENT
Start: 2023-06-26

## 2023-06-26 NOTE — LETTER
August 4, 2023      Cindy Espinoza  [address] [place] [address]   [facility] [id number]        Kim White,    We are writing to inform you of your test results.    Your test that you mailed in was NORMAL - we will plan to repeat it next year!    Resulted Orders   Fecal colorectal cancer screen FITT   Result Value Ref Range    Occult Blood Screen FIT Negative Negative       If you have any questions or concerns, please call the clinic at the number listed above.       Sincerely,      Arleth Phelps MD

## 2023-06-26 NOTE — PROGRESS NOTES
Assessment & Plan     Hyperparathyroidism (H)  Vitamin D deficiency  Just saw endo has questions rereviewed labs. She reiterated desire to avoid surgery, has appointment for scheduled Reclast infusion 7/12.   - vitamin D3 (CHOLECALCIFEROL) 50 mcg (2000 units) tablet  Dispense: 100 tablet; Refill: 3    Dry eyes  Requests refill of Xiidra again, already denies by insurance. Restasis previously used without good effect. See above separate prior authorization note as recommend by opthalmology she saw earlier this month  - XIIDRA 5 % ophthalmic solution  Dispense: 60 each; Refill: 11    Screen for colon cancer  - Fecal colorectal cancer screen FITT  - Fecal colorectal cancer screen FITT    Visit for screening mammogram  - Screening Mammogram Digital Bilateral     Time spent by me doing chart review, history and exam, documentation and further activities per the note    I spent a total of 37 minutes on the day of the visit.    No follow-ups on file.    The information in this document, created by the medical scribe for me, accurately reflects the services I personally performed and the decisions made by me. I have reviewed and approved this document for accuracy prior to leaving the patient care area.  Arleth Phelps MD  12:56 PM, 06/26/23  [facility] DARIAN White is a 63 year old, presenting for the following health issues:  RECHECK (Pt here for follow up)        3/17/2023     3:59 PM   Additional Questions   Roomed by Sayda   Accompanied by Self     HPI   Thyroid  Labs reviewed in detail. Reviewed thyroid, patient affirms she would like to avoid neck surgery. She denies any acute symptoms related to her thyroid.     Abdomen/Spine  Recent CT Abdomen reviewed, unremarkable. Reviewed stages of spondylolisthesis.    She denies nerve pain in legs or lower back.     Eyes  Patient has tried multiple eye drops for itchy eye, and requests a refill for the latest eye drop she has taken, Vyzulta.  Patient agrees that there was an insurance denial of this.     She last saw ophthalmology April 27th.       Review of Systems   Positive for: Dry eyes, abdominal discomfort.   Negative for: Eye pain, lower back nerve pain.     This document serves as a record of the services and decisions personally performed and made by Arleth Phelps MD. It was created on his/her behalf by Woodrow Bojorquez, a trained medical scribe. The creation of this document is based the provider's statements to the medical scribe.  Scribe Woodrow Bojorquez 3:23 PM, June 26, 2023        Objective    /85 (BP Location: Left arm, Patient Position: Sitting, Cuff Size: Adult Regular)   Pulse 69   Resp 16   Wt 75.8 kg (167 lb 3.2 oz)   LMP  (LMP Unknown)   SpO2 98%   BMI 29.26 kg/m    Body mass index is 29.26 kg/m .  Physical Exam   GENERAL: healthy, alert and no distress  PSYCH: mentation appears normal, affect normal/bright

## 2023-06-26 NOTE — PATIENT INSTRUCTIONS
Prior Authorization filled for Vyzulta.     2.   Vitamin D refilled    3.   Next Dexa Scan in the Fall.     Preventive  Scheduled annual mammograms, they will reach out to you.     2.   FITT test materials for colorectal cancer screening sent home.

## 2023-06-26 NOTE — PROGRESS NOTES
Prior Authorization Note     Medication name, dose and frequency  xiidra 5% ophthalmic solution 1 drop each eye two times daily       This medication is known to be safe and effective, and is considered medically necessary for the the treatment of dry eye.     Pt has previously tried and failed:   1. Restasis, prescribed by ophthalmology in 2022, discontinued in 2023 due to lack of efficacy  2. Ulices ointment application at bedtime, started 9/12/2020 and discontinued due to blurry vision     Please expedite coverage to prevent worsening of the patient's condition.

## 2023-07-05 PROCEDURE — 82274 ASSAY TEST FOR BLOOD FECAL: CPT | Performed by: FAMILY MEDICINE

## 2023-07-07 LAB — HEMOCCULT STL QL IA: NEGATIVE

## 2023-07-12 ENCOUNTER — INFUSION THERAPY VISIT (OUTPATIENT)
Dept: INFUSION THERAPY | Facility: CLINIC | Age: 63
End: 2023-07-12
Attending: STUDENT IN AN ORGANIZED HEALTH CARE EDUCATION/TRAINING PROGRAM
Payer: COMMERCIAL

## 2023-07-12 VITALS
SYSTOLIC BLOOD PRESSURE: 128 MMHG | OXYGEN SATURATION: 96 % | BODY MASS INDEX: 29.29 KG/M2 | WEIGHT: 167.4 LBS | TEMPERATURE: 98 F | RESPIRATION RATE: 16 BRPM | HEART RATE: 79 BPM | DIASTOLIC BLOOD PRESSURE: 80 MMHG

## 2023-07-12 DIAGNOSIS — M81.0 AGE-RELATED OSTEOPOROSIS WITHOUT CURRENT PATHOLOGICAL FRACTURE: ICD-10-CM

## 2023-07-12 DIAGNOSIS — E21.3 HYPERPARATHYROIDISM (H): Primary | ICD-10-CM

## 2023-07-12 PROCEDURE — 999N000248 HC STATISTIC IV INSERT WITH US BY RN

## 2023-07-12 PROCEDURE — 96365 THER/PROPH/DIAG IV INF INIT: CPT

## 2023-07-12 PROCEDURE — 250N000011 HC RX IP 250 OP 636: Mod: JZ | Performed by: STUDENT IN AN ORGANIZED HEALTH CARE EDUCATION/TRAINING PROGRAM

## 2023-07-12 RX ORDER — ZOLEDRONIC ACID 5 MG/100ML
5 INJECTION, SOLUTION INTRAVENOUS ONCE
Status: CANCELLED
Start: 2023-07-12

## 2023-07-12 RX ORDER — EPINEPHRINE 1 MG/ML
0.3 INJECTION, SOLUTION INTRAMUSCULAR; SUBCUTANEOUS EVERY 5 MIN PRN
Status: CANCELLED | OUTPATIENT
Start: 2023-07-12

## 2023-07-12 RX ORDER — ACETAMINOPHEN 325 MG/1
650 TABLET ORAL ONCE
Status: COMPLETED | OUTPATIENT
Start: 2023-07-12 | End: 2023-07-12

## 2023-07-12 RX ORDER — ALBUTEROL SULFATE 90 UG/1
1-2 AEROSOL, METERED RESPIRATORY (INHALATION)
Status: CANCELLED
Start: 2023-07-12

## 2023-07-12 RX ORDER — HEPARIN SODIUM,PORCINE 10 UNIT/ML
5 VIAL (ML) INTRAVENOUS
Status: CANCELLED | OUTPATIENT
Start: 2023-07-12

## 2023-07-12 RX ORDER — METHYLPREDNISOLONE SODIUM SUCCINATE 125 MG/2ML
125 INJECTION, POWDER, LYOPHILIZED, FOR SOLUTION INTRAMUSCULAR; INTRAVENOUS
Status: CANCELLED
Start: 2023-07-12

## 2023-07-12 RX ORDER — ACETAMINOPHEN 325 MG/1
650 TABLET ORAL ONCE
Status: CANCELLED | OUTPATIENT
Start: 2023-07-12

## 2023-07-12 RX ORDER — DIPHENHYDRAMINE HYDROCHLORIDE 50 MG/ML
50 INJECTION INTRAMUSCULAR; INTRAVENOUS
Status: CANCELLED
Start: 2023-07-12

## 2023-07-12 RX ORDER — HEPARIN SODIUM (PORCINE) LOCK FLUSH IV SOLN 100 UNIT/ML 100 UNIT/ML
5 SOLUTION INTRAVENOUS
Status: CANCELLED | OUTPATIENT
Start: 2023-07-12

## 2023-07-12 RX ORDER — ZOLEDRONIC ACID 5 MG/100ML
5 INJECTION, SOLUTION INTRAVENOUS ONCE
Status: COMPLETED | OUTPATIENT
Start: 2023-07-12 | End: 2023-07-12

## 2023-07-12 RX ORDER — NALOXONE HYDROCHLORIDE 0.4 MG/ML
0.2 INJECTION, SOLUTION INTRAMUSCULAR; INTRAVENOUS; SUBCUTANEOUS
Status: CANCELLED | OUTPATIENT
Start: 2023-07-12

## 2023-07-12 RX ORDER — MEPERIDINE HYDROCHLORIDE 25 MG/ML
25 INJECTION INTRAMUSCULAR; INTRAVENOUS; SUBCUTANEOUS EVERY 30 MIN PRN
Status: CANCELLED | OUTPATIENT
Start: 2023-07-12

## 2023-07-12 RX ORDER — ALBUTEROL SULFATE 0.83 MG/ML
2.5 SOLUTION RESPIRATORY (INHALATION)
Status: CANCELLED | OUTPATIENT
Start: 2023-07-12

## 2023-07-12 RX ADMIN — ZOLEDRONIC ACID 5 MG: 0.05 INJECTION, SOLUTION INTRAVENOUS at 16:16

## 2023-07-12 ASSESSMENT — PAIN SCALES - GENERAL: PAINLEVEL: NO PAIN (0)

## 2023-07-12 NOTE — PATIENT INSTRUCTIONS
Dear Cindy Espinoza    Thank you for choosing [facility] ([facility]) for your infusion.  The following information is a summary of our appointment as well as important reminders.      We look forward in seeing you on your next appointment here at [facility]  Please don t hesitate to call us at [phone] to reschedule any of your appointments or to speak with one of the [facility] registered nurses.  It was a pleasure taking care of you today.    Sincerely,    [facility]  [address] [place] [address]  [id number]  Phone:  [phone]

## 2023-07-12 NOTE — PROGRESS NOTES
Infusion Nursing Note:  Cindy Espinoza presents today for Reclast.    Patient seen by provider today: No   present during visit today: Not Applicable.    Note: N/A.    Intravenous Access:  Peripheral IV [facility] by [facility]    Treatment Conditions:  Results reviewed, labs MET treatment parameters, ok to proceed with treatment.    Report given to EYAD Lujan, at 1630. Care transferred at this time.     Post Infusion Assessment:  Patient tolerated infusion without incident.  Site patent and intact, free from redness, edema or discomfort.  Access discontinued per protocol.     Discharge Plan:   Patient and/or family verbalized understanding of discharge instructions and all questions answered.  Patient discharged in stable condition accompanied by: self.    Administrations This Visit     zoledronic Acid (RECLAST) infusion 5 mg     Admin Date  07/12/2023 Action  $New Bag Dose  5 mg Rate  200 mL/hr Route  Intravenous Administered By  Dafne Garcia RN Alyssa Sakhitab-Kerestes, RN

## 2023-07-31 ENCOUNTER — TELEPHONE (OUTPATIENT)
Dept: FAMILY MEDICINE | Facility: CLINIC | Age: 63
End: 2023-07-31
Payer: COMMERCIAL

## 2023-07-31 NOTE — TELEPHONE ENCOUNTER
Patient called enquiring her referral of  mammogram help to schedule on 8/8/2023 at 4:00 pm   Ama Rahman RN

## 2023-08-08 ENCOUNTER — ANCILLARY PROCEDURE (OUTPATIENT)
Dept: MAMMOGRAPHY | Facility: CLINIC | Age: 63
End: 2023-08-08
Attending: FAMILY MEDICINE
Payer: COMMERCIAL

## 2023-08-08 DIAGNOSIS — Z12.31 VISIT FOR SCREENING MAMMOGRAM: ICD-10-CM

## 2023-08-08 PROCEDURE — 77067 SCR MAMMO BI INCL CAD: CPT | Performed by: STUDENT IN AN ORGANIZED HEALTH CARE EDUCATION/TRAINING PROGRAM

## 2023-08-31 DIAGNOSIS — H40.1130 PRIMARY OPEN ANGLE GLAUCOMA OF BOTH EYES, UNSPECIFIED GLAUCOMA STAGE: Primary | ICD-10-CM

## 2023-09-01 NOTE — TELEPHONE ENCOUNTER
VYZULTA 0.024 % SOLN ophthalmic solution     Sig: INSTILL 1 DROP IN EACH EYE NIGHTLY   Class: Historical     Last Office Visit : 4-  Future Office visit:  none

## 2023-09-02 RX ORDER — LATANOPROSTENE BUNOD 0.24 MG/ML
1 SOLUTION/ DROPS OPHTHALMIC AT BEDTIME
Qty: 5 ML | Refills: 1 | Status: SHIPPED | OUTPATIENT
Start: 2023-09-02

## 2023-12-11 ENCOUNTER — OFFICE VISIT (OUTPATIENT)
Dept: FAMILY MEDICINE | Facility: CLINIC | Age: 63
End: 2023-12-11
Payer: COMMERCIAL

## 2023-12-11 VITALS
WEIGHT: 173.1 LBS | BODY MASS INDEX: 30.67 KG/M2 | RESPIRATION RATE: 16 BRPM | HEART RATE: 70 BPM | DIASTOLIC BLOOD PRESSURE: 79 MMHG | HEIGHT: 63 IN | OXYGEN SATURATION: 96 % | SYSTOLIC BLOOD PRESSURE: 138 MMHG

## 2023-12-11 DIAGNOSIS — G44.229 CHRONIC TENSION-TYPE HEADACHE, NOT INTRACTABLE: Primary | ICD-10-CM

## 2023-12-11 DIAGNOSIS — E55.9 VITAMIN D DEFICIENCY: ICD-10-CM

## 2023-12-11 PROCEDURE — 99214 OFFICE O/P EST MOD 30 MIN: CPT | Performed by: FAMILY MEDICINE

## 2023-12-11 RX ORDER — MELOXICAM 7.5 MG/1
7.5 TABLET ORAL DAILY
Qty: 30 TABLET | Refills: 2 | Status: SHIPPED | OUTPATIENT
Start: 2023-12-11

## 2023-12-11 RX ORDER — ACETAMINOPHEN 500 MG
1000 TABLET ORAL EVERY 8 HOURS PRN
Qty: 100 TABLET | Refills: 11 | Status: SHIPPED | OUTPATIENT
Start: 2023-12-11

## 2023-12-11 RX ORDER — CHOLECALCIFEROL (VITAMIN D3) 50 MCG
50 TABLET ORAL DAILY
Qty: 100 TABLET | Refills: 3 | Status: SHIPPED | OUTPATIENT
Start: 2023-12-11

## 2023-12-11 NOTE — PROGRESS NOTES
"  Assessment & Plan     Chronic tension-type headache, not intractable  Discussed using medication and ice and stretching   - acetaminophen (TYLENOL) 500 MG tablet; Take 2 tablets (1,000 mg) by mouth every 8 hours as needed for pain  - meloxicam (MOBIC) 7.5 MG tablet; Take 1 tablet (7.5 mg) by mouth daily    Vitamin D deficiency  refill  - vitamin D3 (CHOLECALCIFEROL) 50 mcg (2000 units) tablet; Take 1 tablet (50 mcg) by mouth daily  - acetaminophen (TYLENOL) 500 MG tablet; Take 2 tablets (1,000 mg) by mouth every 8 hours as needed for pain  Declined immunizations.           I spent a total of 32 minutes on the day of the visit.   Time spent by me doing chart review, history and exam, documentation and further activities per the note       BMI:   Estimated body mass index is 30.29 kg/m  as calculated from the following:    Height as of this encounter: 1.61 m (5' 3.39\").    Weight as of this encounter: 78.5 kg (173 lb 1.6 oz).           Return if symptoms worsen or fail to improve.    Young King MD  [facility] DARIAN White is a 63 year old, presenting for the following health issues:  Migraine (Left sided head pain for a month. Patient states the pain is worsening over time. Taking tylenol for pain )      12/11/2023     4:17 PM   Additional Questions   Roomed by Madhav   Accompanied by Self       HPI   Headache on the left side -the whole head and the back of the neck , happens since the last month. This is a new headache. Severity is moderately problematic. The recurrence bothers her. It occurs for several days then disappears.  The past week it has been\"non stop\"   Denies associated symptoms and nausea.               Review of Systems           Objective    /79   Pulse 70   Resp 16   Ht 1.61 m (5' 3.39\")   Wt 78.5 kg (173 lb 1.6 oz)   LMP  (LMP Unknown)   SpO2 96%   BMI 30.29 kg/m    Body mass index is 30.29 kg/m .  Physical Exam  Vitals reviewed.   Constitutional:      "  General: She is not in acute distress.     Appearance: She is well-developed.   HENT:      Head: Normocephalic and atraumatic.      Right Ear: External ear normal.      Left Ear: External ear normal.      Nose: Nose normal.   Eyes:      Conjunctiva/sclera: Conjunctivae normal.      Pupils: Pupils are equal, round, and reactive to light.   Neck:      Thyroid: No thyromegaly.   Cardiovascular:      Rate and Rhythm: Normal rate and regular rhythm.      Heart sounds: Normal heart sounds.   Pulmonary:      Effort: Pulmonary effort is normal.      Breath sounds: Normal breath sounds.   Musculoskeletal:         General: Normal range of motion.      Cervical back: Normal range of motion.   Neurological:      Mental Status: She is alert and oriented to person, place, and time.      Cranial Nerves: No cranial nerve deficit.      Motor: No abnormal muscle tone.      Coordination: Coordination normal.      Gait: Gait normal.      Deep Tendon Reflexes: Reflexes are normal and symmetric. Reflexes normal. Babinski sign absent on the right side. Babinski sign absent on the left side.      Reflex Scores:       Patellar reflexes are 2+ on the right side and 2+ on the left side.       Achilles reflexes are 2+ on the right side and 2+ on the left side.  Psychiatric:         Speech: Speech normal.         Behavior: Behavior normal.         Thought Content: Thought content normal.         Judgment: Judgment normal.

## 2023-12-11 NOTE — PATIENT INSTRUCTIONS
Patient Education   Here is the plan from today's visit    1. Chronic tension-type headache, not intractable  Try neck stretching and ice also for the pain  - acetaminophen (TYLENOL) 500 MG tablet; Take 2 tablets (1,000 mg) by mouth every 8 hours as needed for pain  Dispense: 100 tablet; Refill: 11  - meloxicam (MOBIC) 7.5 MG tablet; Take 1 tablet (7.5 mg) by mouth daily  Dispense: 30 tablet; Refill: 2    2. Vitamin D deficiency    - vitamin D3 (CHOLECALCIFEROL) 50 mcg (2000 units) tablet; Take 1 tablet (50 mcg) by mouth daily  Dispense: 100 tablet; Refill: 3  - acetaminophen (TYLENOL) 500 MG tablet; Take 2 tablets (1,000 mg) by mouth every 8 hours as needed for pain  Dispense: 100 tablet; Refill: 11          Please call or return to clinic if your symptoms don't go away.    Follow up plan  Return if symptoms worsen or fail to improve.    Thank you for coming to [facility] today.  Lab Testing:  **If you had lab testing today and your results are reassuring or normal they will be mailed to you or sent through [employer] within 7 days.   **If the lab tests need quick action we will call you with the results.  **If you are having labs done on a different day, please call [phone] to schedule at [facility] or [phone] for other [facility] locations. Labs do not offer walk-in appointments.  The phone number we will call with results is # [phone] (home) . If this is not the best number please call our clinic and change the number.  Medication Refills:  If you need any refills please call your pharmacy and they will contact us.   If you need to  your refill at a new pharmacy, please contact the new pharmacy directly. The new pharmacy will help you get your medications transferred faster.   Scheduling:  If you have any concerns about today's visit or wish to schedule another appointment please call our office during normal business hours [phone] (8-5:00 M-F). If you can no  longer make a scheduled visit, please cancel via [employer] or call us to cancel.   If a referral was made to an [facility] specialty provider and you do not get a call from central scheduling, please refer to directions on your visit summary or call our office during normal business hours for assistance.   If a Mammogram was ordered for you at the [facility] call [phone] to schedule or change your appointment.  If you had an XRay/CT/Ultrasound/MRI ordered the number is [phone] to schedule or change your radiology appointment.   [facility] has limited ultrasound appointments available on Wednesdays, if you would like your ultrasound at [facility], please call [phone] to schedule.   Medical Concerns:  If you have urgent medical concerns please call [phone] at any time of the day.    Young King MD

## 2023-12-18 ENCOUNTER — ANCILLARY PROCEDURE (OUTPATIENT)
Dept: BONE DENSITY | Facility: CLINIC | Age: 63
End: 2023-12-18
Attending: STUDENT IN AN ORGANIZED HEALTH CARE EDUCATION/TRAINING PROGRAM
Payer: COMMERCIAL

## 2023-12-18 DIAGNOSIS — M81.0 AGE-RELATED OSTEOPOROSIS WITHOUT CURRENT PATHOLOGICAL FRACTURE: ICD-10-CM

## 2023-12-18 PROCEDURE — 77080 DXA BONE DENSITY AXIAL: CPT | Performed by: INTERNAL MEDICINE

## 2024-05-31 ENCOUNTER — OFFICE VISIT (OUTPATIENT)
Dept: FAMILY MEDICINE | Facility: CLINIC | Age: 64
End: 2024-05-31
Payer: COMMERCIAL

## 2024-05-31 VITALS
SYSTOLIC BLOOD PRESSURE: 117 MMHG | DIASTOLIC BLOOD PRESSURE: 82 MMHG | HEART RATE: 95 BPM | OXYGEN SATURATION: 98 % | TEMPERATURE: 97.3 F | RESPIRATION RATE: 16 BRPM | HEIGHT: 63 IN | WEIGHT: 171.5 LBS | BODY MASS INDEX: 30.39 KG/M2

## 2024-05-31 DIAGNOSIS — E21.3 HYPERPARATHYROIDISM (H): ICD-10-CM

## 2024-05-31 DIAGNOSIS — G89.29 CHRONIC LEFT EAR PAIN: ICD-10-CM

## 2024-05-31 DIAGNOSIS — Z63.6 CAREGIVER BURDEN: ICD-10-CM

## 2024-05-31 DIAGNOSIS — E78.2 MIXED HYPERLIPIDEMIA: ICD-10-CM

## 2024-05-31 DIAGNOSIS — Z12.11 COLON CANCER SCREENING: ICD-10-CM

## 2024-05-31 DIAGNOSIS — Z00.00 MEDICARE ANNUAL WELLNESS VISIT, SUBSEQUENT: Primary | ICD-10-CM

## 2024-05-31 DIAGNOSIS — H92.02 CHRONIC LEFT EAR PAIN: ICD-10-CM

## 2024-05-31 DIAGNOSIS — L76.82 INCISIONAL PAIN: ICD-10-CM

## 2024-05-31 DIAGNOSIS — Z13.9 ENCOUNTER FOR SCREENING INVOLVING SOCIAL DETERMINANTS OF HEALTH (SDOH): ICD-10-CM

## 2024-05-31 DIAGNOSIS — J30.2 SEASONAL ALLERGIC RHINITIS, UNSPECIFIED TRIGGER: ICD-10-CM

## 2024-05-31 PROBLEM — R10.11 RUQ ABDOMINAL PAIN: Status: RESOLVED | Noted: 2019-11-18 | Resolved: 2024-05-31

## 2024-05-31 LAB — HBA1C MFR BLD: 5.6 % (ref 0–5.6)

## 2024-05-31 PROCEDURE — 99396 PREV VISIT EST AGE 40-64: CPT | Mod: 25 | Performed by: FAMILY MEDICINE

## 2024-05-31 PROCEDURE — 80061 LIPID PANEL: CPT | Performed by: FAMILY MEDICINE

## 2024-05-31 PROCEDURE — 84100 ASSAY OF PHOSPHORUS: CPT | Performed by: FAMILY MEDICINE

## 2024-05-31 PROCEDURE — 80048 BASIC METABOLIC PNL TOTAL CA: CPT | Performed by: FAMILY MEDICINE

## 2024-05-31 PROCEDURE — 83036 HEMOGLOBIN GLYCOSYLATED A1C: CPT | Performed by: FAMILY MEDICINE

## 2024-05-31 PROCEDURE — 99213 OFFICE O/P EST LOW 20 MIN: CPT | Mod: 25 | Performed by: FAMILY MEDICINE

## 2024-05-31 PROCEDURE — 82306 VITAMIN D 25 HYDROXY: CPT | Performed by: FAMILY MEDICINE

## 2024-05-31 PROCEDURE — 83970 ASSAY OF PARATHORMONE: CPT | Performed by: FAMILY MEDICINE

## 2024-05-31 PROCEDURE — 36415 COLL VENOUS BLD VENIPUNCTURE: CPT | Performed by: FAMILY MEDICINE

## 2024-05-31 RX ORDER — FLUTICASONE PROPIONATE 50 MCG
1-2 SPRAY, SUSPENSION (ML) NASAL DAILY
Status: SHIPPED
Start: 2024-05-31

## 2024-05-31 SDOH — HEALTH STABILITY: PHYSICAL HEALTH: ON AVERAGE, HOW MANY DAYS PER WEEK DO YOU ENGAGE IN MODERATE TO STRENUOUS EXERCISE (LIKE A BRISK WALK)?: 1 DAY

## 2024-05-31 SDOH — SOCIAL STABILITY - SOCIAL INSECURITY: DEPENDENT RELATIVE NEEDING CARE AT HOME: Z63.6

## 2024-05-31 ASSESSMENT — SOCIAL DETERMINANTS OF HEALTH (SDOH): HOW OFTEN DO YOU GET TOGETHER WITH FRIENDS OR RELATIVES?: ONCE A WEEK

## 2024-05-31 NOTE — PATIENT INSTRUCTIONS
"FIT test materials ordered. Complete this test and return it.  Blood draw labs today.  Referral sent to ENT. They will call you to schedule.   Try diclofenac cream for abdominal pain.  Get shingles vaccine at pharmacy here or at your pharmacy.  Preventive Care Advice   This is general advice we often give to help people stay healthy. Your care team may have specific advice just for you. Please talk to your care team about your own preventive care needs.  Lifestyle  Exercise at least 150 minutes each week (30 minutes a day, 5 days a week).  Do muscle strengthening activities 2 days a week. These help control your weight and prevent disease.  No smoking.  Wear sunscreen to prevent skin cancer.  Have your home tested for radon every 2 to 5 years. Radon is a colorless, odorless gas that can harm your lungs. To learn more, go to www.health.[facility].mn.us and search for \"Radon in Homes.\"  Keep guns unloaded and locked up in a safe place like a safe or gun vault, or, use a gun lock and hide the keys. Always lock away bullets separately. To learn more, visit My[employer].[place].gov and search for \"safe gun storage.\"  Nutrition  Eat 5 or more servings of fruits and vegetables each day.  Try wheat bread, brown rice and whole grain pasta (instead of white bread, rice, and pasta).  Get enough calcium and vitamin D. Check the label on foods and aim for 100% of the RDA (recommended daily allowance).  Regular exams  Have a dental exam and cleaning every 6 months.  See your health care team every year to talk about:  Any changes in your health.  Any medicines your care team has prescribed.  Preventive care, family planning, and ways to prevent chronic diseases.  Shots (vaccines)   HPV shots (up to age 26), if you've never had them before.  Hepatitis B shots (up to age 59), if you've never had them before.  COVID-19 shot: Get this shot when it's due.  Flu shot: Get a flu shot every year.  Tetanus shot: Get a tetanus shot every 10 " years.  Pneumococcal, hepatitis A, and RSV shots: Ask your care team if you need these based on your risk.  Shingles shot (for age 50 and up).  General health tests  Diabetes screening:  Starting at age 35, Get screened for diabetes at least every 3 years.  If you are younger than age 35, ask your care team if you should be screened for diabetes.  Cholesterol test: At age 39, start having a cholesterol test every 5 years, or more often if advised.  Bone density scan (DEXA): At age 50, ask your care team if you should have this scan for osteoporosis (brittle bones).  Hepatitis C: Get tested at least once in your life.  Abdominal aortic aneurysm screening: Talk to your doctor about having this screening if you:  Have ever smoked; and  Are biologically male; and  Are between the ages of 65 and 75.  Cancer screening tests  Cervical cancer screening: If you have a cervix, begin getting regular cervical cancer screening tests at age 21. Most people who have regular screenings with normal results can stop after age 65. Talk about this with your provider.  Breast cancer scan (mammogram): If you've ever had breasts, begin having regular mammograms starting at age 40. This is a scan to check for breast cancer.  Colon cancer screening: It is important to start screening for colon cancer at age 45.  Have a colonoscopy test every 10 years (or more often if you're at risk) Or, ask your provider about stool tests like a FIT test every year or Cologuard test every 3 years.  To learn more about your testing options, visit: www.[employer]/515881.pdf.  For help making a decision, visit: jr/nr30182.  For informational purposes only. Not to replace the advice of your health care provider. Copyright   2023 [employer] ([employer]. All rights reserved. Clinically reviewed by the [facility] Transitions Program. [employer] [id number] - REV 04/24.

## 2024-05-31 NOTE — PROGRESS NOTES
Preventive Care Visit  [facility] DARIAN Phelps MD, Family Medicine  May 31, 2024      Assessment & Plan     Medicare annual wellness visit, subsequent  Reviewed AVS in detail. Recommend dental visit and continue to minimize sugar in the diet. Focus on walking for exercise. Recommended shingles vaccine at the pharmacy.    Mixed hyperlipidemia  Lipids today, results by mail.   - Lipid panel reflex to direct LDL Non-fasting  - Lipid panel reflex to direct LDL Non-fasting  Given stroke hx and ,  would recommend Statin - letter sent      Incisional pain  Chronic longstanding since gallbladder surgery. No evidence of hernia today and not significantly tender on exam. Has never tried a topical treatment. Will trial diclofenac.   - diclofenac (VOLTAREN) 1 % topical gel  Dispense: 100 g; Refill: 1    Chronic left ear pain  Also chronic, greater than 10 years in origin. Seen by ENT in 2014 and 2018 but that was really directed to the incidental finding of the parathyroid gland issue. Doesn't appear they ever circled back to original ear pain. Exam prev doc as normal so referred back to ENT given change in exam and ongoing pain.   - Adult ENT  Referral    Hyperparathyroidism (H24)  Secondary to parathyroid adenoma. Surgery was initially recommend but was seen for second opinion at [facility] and they recommend watch and wait due to normal calcium levels reported over years but now calcium has crept up somewhat. Will check parathyroid, vitamin D, Phosphorus, and calcium level today. Will send letter with results and we will follow-up verbally at her son's home visit.  - Parathyroid Hormone Intact  - Parathyroid Hormone Intact  - Vitamin D  - Phosphorus    Encounter for screening involving social determinants of health (SDoH); Caregiver burden  Struggling with the physical burden of caring for fully dependent adult son especially given that they cannot physically wheel him into the  bathroom for daily hygiene. Letter of support written in his chart for disability accommodated apartment and provided to her today. Otherwise, they have the necessary items to care for him in their apartment.     BMI 30.0-30.9,adult  Reviewed exercise and dietary habits.  - Hemoglobin A1c  - Hemoglobin A1c    Colon cancer screening  Suggested that she return this after July 5th.  - Fecal colorectal cancer screen FITT  - Fecal colorectal cancer screen FITT    Seasonal allergic rhinitis, unspecified trigger  - fluticasone (FLONASE) 50 MCG/ACT nasal spray        Patient has been advised of split billing requirements and indicates understanding: Yes    Counseling  Appropriate preventive services were discussed with this patient, including applicable screening as appropriate for fall prevention, nutrition, physical activity, Tobacco-use cessation, weight loss and cognition.  Checklist reviewing preventive services available has been given to the patient.  Reviewed patient's diet, addressing concerns and/or questions.   She is at risk for lack of exercise and has been provided with information to increase physical activity for the benefit of her well-being.   The patient was instructed to see the dentist every 6 months.     Return in about 53 weeks (around 6/6/2025) for Annual Wellness Visit.    Shana White is a 64 year old, presenting for the following:  Wellness Visit        5/31/2024     2:03 PM   Additional Questions   Roomed by Madhav         5/31/2024    Information    services provided? No          [facility] [place] interpretor is presents at today's visit.     General  Reports she is doing well. Reports her family is doing well. Her last dentist appointment was 2 years ago. She reports they are on a wait list (approximately 2 year wait) for disability apartment for her son, Osbaldo. She reports that the Blayne lift they have for Osbaldo does not fit in the bathroom which makes helping Osbaldo use the  bathroom extremely difficult.     Abdominal pain  She continues to have right upper quadrant abdominal pain that is superficial. She did undergo laparoscopic cholecystectomy in 2019. She feels that her pain is increasing. This pain is where her incision was. She has not tried any topical creams or ointments for this pain. She uses occasional tylenol. She does not have diclofenac, and her previous diclofenac use was for head pain. Pain is exacerbated with palpation or wearing clothing that rubs. This pain is pinching and feels like nerve pain. She reports laying flat on her back causes pinching pain.    Head pain  She reports that she was using a nasal spray to help with congestion while flying. She reports that the nasal spray did decrease pain but it did not relieve all pain. She describes left sided head pain and ear pain specifically. She reports that she had an extraction of one of her teeth years ago. Pain decreased but didn't dissipate completely. She avoids talking on her phone with her left ear. She did see an ENT specialist in 2018 for her parathyroid. She also saw ENT in 2014 for ear pain.    Diet and exercise  She reports that her sugary drinks are tea, but she is using less sugar now. She does walk every day.     Cancer Screening  She has previously been doing mammograms every year. She would like to decrease this to every other year.     Chemical use  She denies any chemical or alcohol use.    Sexual health and safety  She denies any new sexual partners. She denies anyone harming her or threatening her.          5/31/2024   General Health   How would you rate your overall physical health? Good   Feel stress (tense, anxious, or unable to sleep) Not at all         5/31/2024   Nutrition   Three or more servings of calcium each day? Yes   Diet: Regular (no restrictions)   How many servings of fruit and vegetables per day? (!) 2-3   How many sweetened beverages each day? (!) 2         5/31/2024   Exercise    Days per week of moderate/strenous exercise 1 day   (!) EXERCISE CONCERN      5/31/2024   Social Factors   Frequency of gathering with friends or relatives Once a week   Worry food won't last until get money to buy more No   Food not last or not have enough money for food? No   Do you have housing?  Yes   Are you worried about losing your housing? No   Lack of transportation? No   Unable to get utilities (heat,electricity)? No         5/31/2024   Fall Risk   Fallen 2 or more times in the past year? No   Trouble with walking or balance? No          5/31/2024   Dental   Dentist two times every year? (!) NO         5/31/2024   TB Screening   Were you born outside of the [place]? Yes         Today's PHQ-2 Score:       5/31/2024     1:59 PM   PHQ-2 ( 1999 Pfizer)   Q1: Little interest or pleasure in doing things 0   Q2: Feeling down, depressed or hopeless 0   PHQ-2 Score 0   Q1: Little interest or pleasure in doing things Not at all   Q2: Feeling down, depressed or hopeless Not at all   PHQ-2 Score 0           5/31/2024   Substance Use   Alcohol more than 3/day or more than 7/wk Not Applicable   Do you use any other substances recreationally? No     Social History     Tobacco Use    Smoking status: Never     Passive exposure: Never    Smokeless tobacco: Never   Substance Use Topics    Alcohol use: No    Drug use: No           8/8/2023   LAST FHS-7 RESULTS   1st degree relative breast or ovarian cancer No   Any relative bilateral breast cancer No   Any male have breast cancer No   Any ONE woman have BOTH breast AND ovarian cancer No   Any woman with breast cancer before 50yrs No   2 or more relatives with breast AND/OR ovarian cancer No        Mammogram Screening - Patient under 40 years of age: Routine Mammogram Screening not recommended.         5/31/2024   STI Screening   New sexual partner(s) since last STI/HIV test? No     History of abnormal Pap smear: No - age 30- 64 PAP with HPV every 5 years recommended         Latest Ref Rng & Units 2021     1:08 PM 12/15/2016     2:20 PM 12/15/2016    12:00 AM   PAP / HPV   PAP (Historical)  NIL   NIL    HPV 16 DNA NEG^Negative Negative  Negative     HPV 18 DNA NEG^Negative Negative  Negative     Other HR HPV NEG^Negative Negative  Negative       ASCVD Risk   The ASCVD Risk score (Jimmy CORONA, et al., 2019) failed to calculate for the following reasons:    The patient has a prior MI or stroke diagnosis    Fracture Risk Assessment Tool  Link to Frax Calculator  Use the information below to complete the Frax calculator  : 1960  Sex: female  Weight (kg): 77.8 kg (actual weight)  Height (cm): 161 cm  Previous Fragility Fracture:  No  History of parent with fractured hip:  No  Current Smoking:  No  Patient has been on glucocorticoids for more than 3 months (5mg/day or more): No  Rheumatoid Arthritis on Problem List:  No  Secondary Osteoporosis on Problem List:  No  Consumes 3 or more units of alcohol per day: No  Femoral Neck BMD (g/cm2)           Reviewed and updated as needed this visit by Provider                    Past Medical History:   Diagnosis Date    Ear pain     Left    Vitamin D deficiency      Past Surgical History:   Procedure Laterality Date    LAPAROSCOPIC CHOLECYSTECTOMY  2019    [facility]     OB History    Para Term  AB Living   9 9 0 0 0 0   SAB IAB Ectopic Multiple Live Births   0 0 0 0 0      # Outcome Date GA Lbr Thierno/2nd Weight Sex Type Anes PTL Lv   9 Para            8 Para            7 Para            6 Para            5 Para            4 Para            3 Para            2 Para            1 Para               Obstetric Comments   Had 9 children, lost 7.         This document serves as a record of the services and decisions personally performed and made by Arleth Phelps MD. It was created on his/her behalf by Ortiz Mason, a trained medical scribe. The creation of this document is based the provider's statements to the medical  "monica Mason 2:12 PM, May 31, 2024      Objective    Exam  /82   Pulse 95   Temp 97.3  F (36.3  C) (Temporal)   Resp 16   Ht 1.61 m (5' 3.39\")   Wt 77.8 kg (171 lb 8 oz)   LMP  (LMP Unknown)   SpO2 98%   BMI 30.01 kg/m     Estimated body mass index is 30.01 kg/m  as calculated from the following:    Height as of this encounter: 1.61 m (5' 3.39\").    Weight as of this encounter: 77.8 kg (171 lb 8 oz).    Physical Exam  GENERAL: alert and no distress  EYES: Eyes grossly normal to inspection, PERRL and conjunctivae and sclerae normal  HENT: very tight slightly painful SCM on the left. Ear drum in retracted position there is increased blood vessel with a very notable purple hue especially between 11 and 3 o'clock. Normal light reflex. Ear canal is normal. Normal tympanic mobility membrane with air. Mild left facial droop, chronic.   NECK: no adenopathy, no asymmetry, masses, or scars  RESP: lungs clear to auscultation - no rales, rhonchi or wheezes  CV: regular rate and rhythm, normal S1 S2, no S3 or S4, no murmur, click or rub, no peripheral edema  ABDOMEN: soft, nontender, no hepatosplenomegaly, no masses and bowel sounds normal  MS: no gross musculoskeletal defects noted, no edema  SKIN: no suspicious lesions or rashes  NEURO: Normal strength and tone, mentation intact and speech normal. Left DTR is decreased compared to the right  PSYCH: mentation appears normal, affect normal/bright    Signed Electronically by: Arleth Phelps MD      Spent 27 minutes addressing acute and chronic concerns in addition to preventive care.   "

## 2024-05-31 NOTE — LETTER
"Sravanthi 3, 2024      Cindy EDMAR Espinoza  [address] [place] [address]   [facility] [id number]        Kim Wallercristy.    We are writing to inform you of your test results. Your cholesterol is high. Because you have had a stroke in the past, I recommend that you start cholesterol lowering medications, often called \"statins.\" I am happy to discuss with you in person if you prefer -please make an appointment.     I did end up checking your Vitamin D, as part of your parathyroid profile- all the results look normal. We will repeat next year.     Resulted Orders   Lipid panel reflex to direct LDL Non-fasting   Result Value Ref Range    Cholesterol 221 (H) <200 mg/dL    Triglycerides 151 (H) <150 mg/dL    Direct Measure HDL 35 (L) >=50 mg/dL    LDL Cholesterol Calculated 156 (H) <=100 mg/dL    Non HDL Cholesterol 186 (H) <130 mg/dL    Patient Fasting > 8hrs? No     Narrative    Cholesterol  Desirable:  <200 mg/dL   Hemoglobin A1c   Result Value Ref Range    Hemoglobin A1C 5.6 0.0 - 5.6 %      Comment:      Normal <5.7%   Prediabetes 5.7-6.4%    Diabetes 6.5% or higher     Note: Adopted from ADA consensus guidelines.   Parathyroid Hormone Intact   Result Value Ref Range    Parathyroid Hormone Intact 51 15 - 65 pg/mL    Narrative    This result was obtained with the Roche Elecsys PTH STAT assay.   This reference range differs from PTH assays used in other [facility] laboratories.   Vitamin D Level   Result Value Ref Range    Vitamin D, Total (25-Hydroxy) 39 20 - 50 ng/mL      Comment:      optimum levels    Narrative    Season, race, dietary intake, and treatment affect the concentration of 25-hydroxy-Vitamin D. Values may decrease during winter months and increase during summer months.    Vitamin D determination is routinely performed by an immunoassay specific for 25 hydroxyvitamin D3.  If an individual is on vitamin D2(ergocalciferol) supplementation, please specify 25 OH vitamin D2 and D3 level determination by " LCMSMS test VITD23.     Phosphorus   Result Value Ref Range    Phosphorus 2.8 2.5 - 4.5 mg/dL   Basic metabolic panel   Result Value Ref Range    Sodium 139 135 - 145 mmol/L      Comment:      Reference intervals for this test were updated on 09/26/2023 to more accurately reflect our healthy population. There may be differences in the flagging of prior results with similar values performed with this method. Interpretation of those prior results can be made in the context of the updated reference intervals.     Potassium 4.2 3.4 - 5.3 mmol/L    Chloride 103 98 - 107 mmol/L    Carbon Dioxide (CO2) 25 22 - 29 mmol/L    Anion Gap 11 7 - 15 mmol/L    Urea Nitrogen 13.3 8.0 - 23.0 mg/dL    Creatinine 0.65 0.51 - 0.95 mg/dL    GFR Estimate >90 >60 mL/min/1.73m2    Calcium 10.2 8.8 - 10.2 mg/dL    Glucose 94 70 - 99 mg/dL    Patient Fasting > 8hrs? No        If you have any questions or concerns, please call the clinic at the number listed above.       Sincerely,      Arleth Phelps MD

## 2024-06-02 LAB
ANION GAP SERPL CALCULATED.3IONS-SCNC: 11 MMOL/L (ref 7–15)
BUN SERPL-MCNC: 13.3 MG/DL (ref 8–23)
CALCIUM SERPL-MCNC: 10.2 MG/DL (ref 8.8–10.2)
CHLORIDE SERPL-SCNC: 103 MMOL/L (ref 98–107)
CHOLEST SERPL-MCNC: 221 MG/DL
CREAT SERPL-MCNC: 0.65 MG/DL (ref 0.51–0.95)
DEPRECATED HCO3 PLAS-SCNC: 25 MMOL/L (ref 22–29)
EGFRCR SERPLBLD CKD-EPI 2021: >90 ML/MIN/1.73M2
FASTING STATUS PATIENT QL REPORTED: NO
FASTING STATUS PATIENT QL REPORTED: NO
GLUCOSE SERPL-MCNC: 94 MG/DL (ref 70–99)
HDLC SERPL-MCNC: 35 MG/DL
LDLC SERPL CALC-MCNC: 156 MG/DL
NONHDLC SERPL-MCNC: 186 MG/DL
PHOSPHATE SERPL-MCNC: 2.8 MG/DL (ref 2.5–4.5)
POTASSIUM SERPL-SCNC: 4.2 MMOL/L (ref 3.4–5.3)
PTH-INTACT SERPL-MCNC: 51 PG/ML (ref 15–65)
SODIUM SERPL-SCNC: 139 MMOL/L (ref 135–145)
TRIGL SERPL-MCNC: 151 MG/DL
VIT D+METAB SERPL-MCNC: 39 NG/ML (ref 20–50)

## 2024-06-05 ENCOUNTER — LAB (OUTPATIENT)
Dept: LAB | Facility: CLINIC | Age: 64
End: 2024-06-05
Payer: COMMERCIAL

## 2024-06-05 ENCOUNTER — OFFICE VISIT (OUTPATIENT)
Dept: ENDOCRINOLOGY | Facility: CLINIC | Age: 64
End: 2024-06-05
Payer: COMMERCIAL

## 2024-06-05 VITALS
WEIGHT: 169 LBS | OXYGEN SATURATION: 96 % | BODY MASS INDEX: 31.1 KG/M2 | HEART RATE: 82 BPM | SYSTOLIC BLOOD PRESSURE: 133 MMHG | HEIGHT: 62 IN | DIASTOLIC BLOOD PRESSURE: 81 MMHG

## 2024-06-05 DIAGNOSIS — E21.3 HYPERPARATHYROIDISM (H): ICD-10-CM

## 2024-06-05 DIAGNOSIS — M81.0 AGE-RELATED OSTEOPOROSIS WITHOUT CURRENT PATHOLOGICAL FRACTURE: ICD-10-CM

## 2024-06-05 DIAGNOSIS — M81.0 AGE-RELATED OSTEOPOROSIS WITHOUT CURRENT PATHOLOGICAL FRACTURE: Primary | ICD-10-CM

## 2024-06-05 PROCEDURE — 99214 OFFICE O/P EST MOD 30 MIN: CPT | Performed by: STUDENT IN AN ORGANIZED HEALTH CARE EDUCATION/TRAINING PROGRAM

## 2024-06-05 PROCEDURE — 99000 SPECIMEN HANDLING OFFICE-LAB: CPT | Performed by: PATHOLOGY

## 2024-06-05 PROCEDURE — 82306 VITAMIN D 25 HYDROXY: CPT | Performed by: STUDENT IN AN ORGANIZED HEALTH CARE EDUCATION/TRAINING PROGRAM

## 2024-06-05 PROCEDURE — 36415 COLL VENOUS BLD VENIPUNCTURE: CPT | Performed by: PATHOLOGY

## 2024-06-05 PROCEDURE — 82523 COLLAGEN CROSSLINKS: CPT | Mod: 90 | Performed by: PATHOLOGY

## 2024-06-05 PROCEDURE — G2211 COMPLEX E/M VISIT ADD ON: HCPCS | Performed by: STUDENT IN AN ORGANIZED HEALTH CARE EDUCATION/TRAINING PROGRAM

## 2024-06-05 ASSESSMENT — PAIN SCALES - GENERAL: PAINLEVEL: MILD PAIN (3)

## 2024-06-05 NOTE — PROGRESS NOTES
Endocrinology Clinic Visit     NAME:  Cindy Espinoza  PCP:  Arleth Phelps  MRN:  [id number]  Reason for Consult:  Hyperparathyroidism   Requesting Provider:  Referred Self    Chief Complaint     Chief Complaint   Patient presents with    Endocrine Problem       History of Present Illness     Cindy Espinoza is a 61 year old female who is seen in clinic for management of hyperparathyroidism and osteoporosis.  Last seen 6/2023    Her PMh is otherwise unremarkable and she is not on any chronic meds.     She has long hx of vitD deficiency dating back in our records to 2011 with normal calcium ( on the upper limit of normal) of 10.2 . She had elevated calcium since 2012 ranging between 10.3-10.6. she elevated PTH of 84,11,137 back in 2012 - 2015 in the setting of vit D defciency and mild hyperCa. On 10/2018 viit D corrected to 28 then 32 and calcium down to 9.5 but PTH was still at 98. Since 2018 calcium has been ranging 10.1-10.3 with PTH of 88-97. kast Vit D check 12/2020 was 33, Ca 10.2 and PTH 97.  Ca 10.1 on 5/2021.  She has normal kidney function.  No history of kidney stones.  No family history of kidney stones or calcium abnormalities.  Osteoporosis history as below.  She declined to complete a 24 hour urine collection in the past, declined surgery even if primary hyperparathyroidism is confirmed.        She had her first DXA 2012 with lowest T -3 at the lumbar spine.  DXA 9/2018 showed a T score of -3.3 at the lumbar spine. She had a Dxa scan 11/23/22: lowest T score of -2.8 at the lumbar spine. Bone density compared to the prior study in 2018 has increased at the lumbar spine , and increased at the right total femur by +11.3% and +4.2% respectively.     Fosamax 70 mg weekly was started [id number] switched to Reclast 10/2018 due to non-compliance (she reported difficulty remembering once a week pills).  She had her second IV Reclast infusion January 2022.  She tolerated the infusion well.     Interval  hx:  Received third reclast 7/2023  She is not on any calcium supplements but tries to get her calcium from diet with daily milk and yogurt intake.  She is on vitamin D 1000 IU daily.  She denied any falls or bone fracture. Has ongoing body pain but otherwise no new concerns or complaints.    Most recent dxa scan 12/2023:    Results   Lumbar Spine  T-score -2.9  (L2-4), BMD is 0.847 g/cm2.   Left femoral neck  T-score -0.5   Right femoral neck  T-score -0.2   Left Total hip  T-score 0.5 , BMD is 1.074 g/cm2.  Right total hip  T-score 0.4, BMD is 1.060 g/cm2.  Radius (1/3 distal): T-score -1.1, BMD 0.628 g/cm2    There was no change at the areas of interest compared to the prior study in 2021 considering historical least significant change data for this DXA machine.       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    Problem List     Patient Active Problem List   Diagnosis    Hypercalcemia    Menopause present    Fibromyalgia    Chronic tension headaches    Osteoporosis    TMJ (temporomandibular joint syndrome)    Abnormal Pap smear of cervix    Caregiver burden    Chronic bilateral low back pain with right-sided sciatica    Age-related osteoporosis without current pathological fracture    Hyperparathyroidism (H24)    Dry eyes    Decreased sense of vibration    Facial droop    Mixed hyperlipidemia    Need for shingles vaccine    Prepatellar bursitis of left knee    Tear of medial meniscus of left knee, current, unspecified tear type, subsequent encounter    Chronic pain of left knee    History of allergy to radiographic contrast media    Vitamin D deficiency        Medications     Current Outpatient Medications   Medication Sig Dispense Refill    acetaminophen (TYLENOL) 500 MG tablet Take 2 tablets (1,000 mg) by mouth every 8 hours as needed for pain 100 tablet 11    diclofenac (VOLTAREN) 1 % topical gel Apply 2 g topically 4 times daily 100 g 1    fluticasone (FLONASE)  50 MCG/ACT nasal spray Spray 1-2 sprays into both nostrils daily      latanoprostene bunod (VYZULTA) 0.024 % SOLN ophthalmic solution Place 1 drop into both eyes At Bedtime 5 mL 1    meloxicam (MOBIC) 7.5 MG tablet Take 1 tablet (7.5 mg) by mouth daily 30 tablet 2    vitamin D3 (CHOLECALCIFEROL) 50 mcg (2000 units) tablet Take 1 tablet (50 mcg) by mouth daily 100 tablet 3    XIIDRA 5 % opthalmic solution Place 1 drop into both eyes 2 times daily 60 each 11     No current facility-administered medications for this visit.        Allergies     Allergies   Allergen Reactions    Contrast Dye Itching and Rash    Iodine Rash     Pt states she is note allergic       Medical / Surgical History     Past Medical History:   Diagnosis Date    Ear pain     Left    Vitamin D deficiency      Past Surgical History:   Procedure Laterality Date    LAPAROSCOPIC CHOLECYSTECTOMY  2019    [facility]       Social History     Social History     Socioeconomic History    Marital status:      Spouse name: Not on file    Number of children: 2    Years of education: Not on file    Highest education level: Not on file   Occupational History    Occupation: had a [employer] in [employer]     Comment: Lives with children   Tobacco Use    Smoking status: Never     Passive exposure: Never    Smokeless tobacco: Never   Substance and Sexual Activity    Alcohol use: No    Drug use: No    Sexual activity: Not Currently   Other Topics Concern    Parent/sibling w/ CABG, MI or angioplasty before 65F 55M? Not Asked   Social History Narrative    Lives with adult son who has advanced XP and requires 24 hour care. Has another son Scarlet, helps with care of Osbaldo. Niece Elyse also helps family.    , spouse  in      Social Determinants of Health     Financial Resource Strain: Low Risk  (2024)    Financial Resource Strain     Within the past 12 months, have you or your family members you live with been unable to get utilities (heat,  electricity) when it was really needed?: No   Food Insecurity: Low Risk  (5/31/2024)    Food Insecurity     Within the past 12 months, did you worry that your food would run out before you got money to buy more?: No     Within the past 12 months, did the food you bought just not last and you didn t have money to get more?: No   Transportation Needs: Low Risk  (5/31/2024)    Transportation Needs     Within the past 12 months, has lack of transportation kept you from medical appointments, getting your medicines, non-medical meetings or appointments, work, or from getting things that you need?: No   Physical Activity: Unknown (5/31/2024)    Exercise Vital Sign     Days of Exercise per Week: 1 day     Minutes of Exercise per Session: Not on file   Stress: No Stress Concern Present (5/31/2024)    [place] [employer] - Occupational Stress Questionnaire     Feeling of Stress : Not at all   Social Connections: Unknown (5/31/2024)    Social Connection and Isolation Panel [NHANES]     Frequency of Communication with Friends and Family: Not on file     Frequency of Social Gatherings with Friends and Family: Once a week     Attends Evangelical Services: Not on file     Active Member of Clubs or Organizations: Not on file     Attends Club or Organization Meetings: Not on file     Marital Status: Not on file   Interpersonal Safety: Low Risk  (12/11/2023)    Interpersonal Safety     Do you feel physically and emotionally safe where you currently live?: Yes     Within the past 12 months, have you been hit, slapped, kicked or otherwise physically hurt by someone?: No     Within the past 12 months, have you been humiliated or emotionally abused in other ways by your partner or ex-partner?: No   Housing Stability: Low Risk  (5/31/2024)    Housing Stability     Do you have housing? : Yes     Are you worried about losing your housing?: No       Family History     Family History   Problem Relation Age of Onset    No  "Known Problems Mother     No Known Problems Father     Other - See Comments Son         xeroderma pigmentosum    Diabetes Son     Pulmonary Embolism Son     No Known Problems Son        ROS   12 ROS completed , pertinent negative and postive in HPI    Physical Exam   /81 (BP Location: Right arm)   Pulse 82   Ht 1.582 m (5' 2.28\")   Wt 76.7 kg (169 lb)   LMP  (LMP Unknown)   SpO2 96%   BMI 30.63 kg/m       General: Comfortable, no obvious distress, normal body habitus  Eyes: Sclera anicteric, moist conjunctiva  HENT: wearing a mask.  Neck: wearing ismael  CV: normal rate.   Resp:  good effort, no evidence of loud wheezing  Abdomen:  obese, non distended.   Skin: No rashes, lesions, or subcutaneous nodules on exposed skin.   Psych: Alert and oriented x 3. Appropriate affect, good insight  Extremities: No peripheral edema  Musculoskeletal: Appropriate muscle bulk and strength  Neuro: Moves all four extremities. No focal deficits on limited exam.    Labs/Imaging     Pertinent Labs were reviewed and updated in [facility] and reviewed.  Radiology Results were  reviewed and updated in EPIC and reviewed.    Summary of recent findings:     TSH   Date Value Ref Range Status   12/28/2020 1.37 0.40 - 4.00 mU/L Final   04/12/2017 1.12 0.40 - 4.00 mU/L Final   05/07/2015 0.93 0.40 - 4.00 mU/L Final   09/02/2011 0.82 0.4 - 5.0 mU/L Final       Creatinine   Date Value Ref Range Status   05/31/2024 0.65 0.51 - 0.95 mg/dL Final   05/03/2021 0.63 0.52 - 1.04 mg/dL Final       No results found for: \"YTBY85XNAXG\", \"[id number]\", \"[id number]\"    Recent Labs   Lab Test 05/03/21  1709 12/28/20  1329 08/06/20  1649    140.4 137   POTASSIUM 3.9 3.6 3.9   CHLORIDE 106 100.8 106   CO2 27 25.2 26   ANIONGAP 4  --  5   GLC 95 103.8* 94   BUN 18 15.2 10   CR 0.63 0.6 0.54   AARON 10.1 10.2* 10.1       I personally reviewed the patient's outside records from [facility] EMR. Summary of pertinent findings in HPI.    Impression / Plan     1. Very " mild Hypercalcemia.   2. Hyperparathyroidism  3. Hx of vitD deficiency  she was seen by Dr. Bernstein back in 2018. She saw Dr. Aguilar (Endocrine, [facility]) in 2012 and was diagnosed with primary hyperparathyroidism, and recommended parathyroidectomy due to osteoporosis. DXA scan then showed lowest T-score of -3.0 in the lumbar spine. Head and neck US then did not seem to localize any parathyroid adenoma. ptsorin was lost to f/up until 2015, when she saw Dr. Trent Bonilla (Endocrine). She was advised observation. NM parathyroid imaging 2018 negative.     Primary hyperparathyroidism versus secondary hyperparathyroidism due to vitamin D deficiency.  PTH remains elevated despite correcting vitamin D which makes primary hyperparathyroidism likely. Familial Hypocalciuric Hypercalemia is on the differential given mild hypercalcemia and mild PTH elevation.  She had a urine spot for calcium over creatinine ratio within normal limits back in 2012.  She declined to do a 24-hour urine collection for calcium and ENT referral in the past.    Her most recent labs 5/2024 with normal ca and PTH.  Would continue to monitor at least yearly.        4.  Osteoporosis  Risk factors include age postmenopausal, vitamin D deficiency and hyperparathyroidism.  Significant improvement in her BMD seen on DEXA scan 11/2022, stable on dxa 12/2023.  She was on Fosamax with poor compliance 2014 through 2018.  She had 1st dose of IV Reclast in 2018, second dose January 2022 and third dose 7/2023.    We reviewed her most recent dxa scan. We discussed the risk and benefit of continuing reclast for more than 3 consecutive doses. We reviewed the risk of ONJ and AFF. She prefers to stop which is reasonable given her dxa results.  We will get bone turnover markers to monitor  Next dxa scan 12/2025.        Test and/or medications prescribed today:  Orders Placed This Encounter   Procedures    DX Bone Density    C-Telopeptide, Beta-Cross-Linked    N  telopeptide cross linked urine    Vitamin D Deficiency (D3 Only)    Albumin level    Calcium    Parathyroid Hormone Intact    Creatinine    N telopeptide cross linked urine    C-Telopeptide, Beta-Cross-Linked       Follow up 1 year    35 minutes spent on the date of the encounter doing chart review, history and exam, documentation and further activities as noted above.       The longitudinal plan of care for the diagnosis(es)/condition(s) as documented were addressed during this visit. Due to the added complexity in care, I will continue to support Cindy in the subsequent management and with ongoing continuity of care.          Ivet Herrera MD  Endocrinology, Diabetes and Metabolism  [facility]

## 2024-06-05 NOTE — NURSING NOTE
"Chief Complaint   Patient presents with    Endocrine Problem     Blood pressure 133/81, pulse 82, height 1.582 m (5' 2.28\"), weight 76.7 kg (169 lb), SpO2 96%, not currently breastfeeding.    Kandy Malik    "

## 2024-06-05 NOTE — PATIENT INSTRUCTIONS
It was nice seeing you.    - we will get bone turnover markers lab today  - repeat labs in 1 year  - dxa scan in 1year  - continue getting at least 1200 mg of calcium from diet    Please reach out to the following centers to schedule your appointment:       Imaging (DEXA, CT, MRI, XRAY)    [facility] ([facility], [facility], [place]) [phone]   [facility] ([place], [place]) [phone]   [facility] ([facility]) [phone]   [facility] ([facility]) [phone]       Lab    General [phone]   [facility] [phone]   [place] [phone]   [facility]  [phone]   [facility] [phone]   [place] [phone]   [facility]) [phone]   [facility] Walk-In Only   [place] [phone]   [place] [phone]   [place] [phone]   [place] [phone]       Infusion    [facility] [phone]   [place] [phone]   [place] [phone]   [place] [phone]   [place] [phone]   [place] [phone]   [place] [phone]     For any questions, please reach out to the Endocrinology Clinic Number for assistance: [phone].

## 2024-06-05 NOTE — LETTER
6/5/2024       RE: Cindy Espinoza  [address] [facility] [address]  [place] [address]     Dear Colleague,    Thank you for referring your patient, Cindy Espinoza, to the [facility] ENDOCRINOLOGY [facility] [place] [facility]. Please see a copy of my visit note below.    Endocrinology Clinic Visit     NAME:  Cindy Espinoza  PCP:  Arleth Phelps  MRN:  [id number]  Reason for Consult:  Hyperparathyroidism   Requesting Provider:  Referred Self    Chief Complaint     Chief Complaint   Patient presents with    Endocrine Problem       History of Present Illness     Cindy Espinoza is a 61 year old female who is seen in clinic for management of hyperparathyroidism and osteoporosis.  Last seen 6/2023    Her PMh is otherwise unremarkable and she is not on any chronic meds.     She has long hx of vitD deficiency dating back in our records to 2011 with normal calcium ( on the upper limit of normal) of 10.2 . She had elevated calcium since 2012 ranging between 10.3-10.6. she elevated PTH of 84,11,137 back in 2012 - 2015 in the setting of vit D defciency and mild hyperCa. On 10/2018 viit D corrected to 28 then 32 and calcium down to 9.5 but PTH was still at 98. Since 2018 calcium has been ranging 10.1-10.3 with PTH of 88-97. kast Vit D check 12/2020 was 33, Ca 10.2 and PTH 97.  Ca 10.1 on 5/2021.  She has normal kidney function.  No history of kidney stones.  No family history of kidney stones or calcium abnormalities.  Osteoporosis history as below.  She declined to complete a 24 hour urine collection in the past, declined surgery even if primary hyperparathyroidism is confirmed.        She had her first DXA 2012 with lowest T -3 at the lumbar spine.  DXA 9/2018 showed a T score of -3.3 at the lumbar spine. She had a Dxa scan 11/23/22: lowest T score of -2.8 at the lumbar spine. Bone density compared to the prior study in 2018 has increased at the lumbar spine ,  and increased at the right total femur by +11.3% and +4.2% respectively.     Fosamax 70 mg weekly was started [id number] switched to Reclast 10/2018 due to non-compliance (she reported difficulty remembering once a week pills).  She had her second IV Reclast infusion January 2022.  She tolerated the infusion well.     Interval hx:  Received third reclast 7/2023  She is not on any calcium supplements but tries to get her calcium from diet with daily milk and yogurt intake.  She is on vitamin D 1000 IU daily.  She denied any falls or bone fracture. Has ongoing body pain but otherwise no new concerns or complaints.    Most recent dxa scan 12/2023:    Results   Lumbar Spine  T-score -2.9  (L2-4), BMD is 0.847 g/cm2.   Left femoral neck  T-score -0.5   Right femoral neck  T-score -0.2   Left Total hip  T-score 0.5 , BMD is 1.074 g/cm2.  Right total hip  T-score 0.4, BMD is 1.060 g/cm2.  Radius (1/3 distal): T-score -1.1, BMD 0.628 g/cm2    There was no change at the areas of interest compared to the prior study in 2021 considering historical least significant change data for this DXA machine.       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    Problem List     Patient Active Problem List   Diagnosis    Hypercalcemia    Menopause present    Fibromyalgia    Chronic tension headaches    Osteoporosis    TMJ (temporomandibular joint syndrome)    Abnormal Pap smear of cervix    Caregiver burden    Chronic bilateral low back pain with right-sided sciatica    Age-related osteoporosis without current pathological fracture    Hyperparathyroidism (H24)    Dry eyes    Decreased sense of vibration    Facial droop    Mixed hyperlipidemia    Need for shingles vaccine    Prepatellar bursitis of left knee    Tear of medial meniscus of left knee, current, unspecified tear type, subsequent encounter    Chronic pain of left knee    History of allergy to radiographic contrast media    Vitamin D  deficiency        Medications     Current Outpatient Medications   Medication Sig Dispense Refill    acetaminophen (TYLENOL) 500 MG tablet Take 2 tablets (1,000 mg) by mouth every 8 hours as needed for pain 100 tablet 11    diclofenac (VOLTAREN) 1 % topical gel Apply 2 g topically 4 times daily 100 g 1    fluticasone (FLONASE) 50 MCG/ACT nasal spray Spray 1-2 sprays into both nostrils daily      latanoprostene bunod (VYZULTA) 0.024 % SOLN ophthalmic solution Place 1 drop into both eyes At Bedtime 5 mL 1    meloxicam (MOBIC) 7.5 MG tablet Take 1 tablet (7.5 mg) by mouth daily 30 tablet 2    vitamin D3 (CHOLECALCIFEROL) 50 mcg (2000 units) tablet Take 1 tablet (50 mcg) by mouth daily 100 tablet 3    XIIDRA 5 % opthalmic solution Place 1 drop into both eyes 2 times daily 60 each 11     No current facility-administered medications for this visit.        Allergies     Allergies   Allergen Reactions    Contrast Dye Itching and Rash    Iodine Rash     Pt states she is note allergic       Medical / Surgical History     Past Medical History:   Diagnosis Date    Ear pain     Left    Vitamin D deficiency      Past Surgical History:   Procedure Laterality Date    LAPAROSCOPIC CHOLECYSTECTOMY  03/2019    [facility]       Social History     Social History     Socioeconomic History    Marital status:      Spouse name: Not on file    Number of children: 2    Years of education: Not on file    Highest education level: Not on file   Occupational History    Occupation: had a [employer] in [employer]     Comment: Lives with children   Tobacco Use    Smoking status: Never     Passive exposure: Never    Smokeless tobacco: Never   Substance and Sexual Activity    Alcohol use: No    Drug use: No    Sexual activity: Not Currently   Other Topics Concern    Parent/sibling w/ CABG, MI or angioplasty before 65F 55M? Not Asked   Social History Narrative    Lives with adult son who has advanced XP and requires 24 hour care. Has another son  Jaretfrancesco, helps with care of Osbaldo. Brodie Pappas also helps family.    , spouse  in 2012     Social Determinants of Health     Financial Resource Strain: Low Risk  (2024)    Financial Resource Strain     Within the past 12 months, have you or your family members you live with been unable to get utilities (heat, electricity) when it was really needed?: No   Food Insecurity: Low Risk  (2024)    Food Insecurity     Within the past 12 months, did you worry that your food would run out before you got money to buy more?: No     Within the past 12 months, did the food you bought just not last and you didn t have money to get more?: No   Transportation Needs: Low Risk  (2024)    Transportation Needs     Within the past 12 months, has lack of transportation kept you from medical appointments, getting your medicines, non-medical meetings or appointments, work, or from getting things that you need?: No   Physical Activity: Unknown (2024)    Exercise Vital Sign     Days of Exercise per Week: 1 day     Minutes of Exercise per Session: Not on file   Stress: No Stress Concern Present (2024)    [employer] - Occupational Stress Questionnaire     Feeling of Stress : Not at all   Social Connections: Unknown (2024)    Social Connection and Isolation Panel [NHANES]     Frequency of Communication with Friends and Family: Not on file     Frequency of Social Gatherings with Friends and Family: Once a week     Attends Faith Services: Not on file     Active Member of Clubs or Organizations: Not on file     Attends Club or Organization Meetings: Not on file     Marital Status: Not on file   Interpersonal Safety: Low Risk  (2023)    Interpersonal Safety     Do you feel physically and emotionally safe where you currently live?: Yes     Within the past 12 months, have you been hit, slapped, kicked or otherwise physically hurt by someone?: No     Within the past 12  "months, have you been humiliated or emotionally abused in other ways by your partner or ex-partner?: No   Housing Stability: Low Risk  (5/31/2024)    Housing Stability     Do you have housing? : Yes     Are you worried about losing your housing?: No       Family History     Family History   Problem Relation Age of Onset    No Known Problems Mother     No Known Problems Father     Other - See Comments Son         xeroderma pigmentosum    Diabetes Son     Pulmonary Embolism Son     No Known Problems Son        ROS   12 ROS completed , pertinent negative and postive in HPI    Physical Exam   /81 (BP Location: Right arm)   Pulse 82   Ht 1.582 m (5' 2.28\")   Wt 76.7 kg (169 lb)   LMP  (LMP Unknown)   SpO2 96%   BMI 30.63 kg/m       General: Comfortable, no obvious distress, normal body habitus  Eyes: Sclera anicteric, moist conjunctiva  HENT: wearing a mask.  Neck: wearing ismael  CV: normal rate.   Resp:  good effort, no evidence of loud wheezing  Abdomen:  obese, non distended.   Skin: No rashes, lesions, or subcutaneous nodules on exposed skin.   Psych: Alert and oriented x 3. Appropriate affect, good insight  Extremities: No peripheral edema  Musculoskeletal: Appropriate muscle bulk and strength  Neuro: Moves all four extremities. No focal deficits on limited exam.    Labs/Imaging     Pertinent Labs were reviewed and updated in [facility] and reviewed.  Radiology Results were  reviewed and updated in EPIC and reviewed.    Summary of recent findings:     TSH   Date Value Ref Range Status   12/28/2020 1.37 0.40 - 4.00 mU/L Final   04/12/2017 1.12 0.40 - 4.00 mU/L Final   05/07/2015 0.93 0.40 - 4.00 mU/L Final   09/02/2011 0.82 0.4 - 5.0 mU/L Final       Creatinine   Date Value Ref Range Status   05/31/2024 0.65 0.51 - 0.95 mg/dL Final   05/03/2021 0.63 0.52 - 1.04 mg/dL Final       No results found for: \"[id number]\", \"[id number]\", \"[id number]\"    Recent Labs   Lab Test 05/03/21  1709 12/28/20  1329 08/06/20  1649 "    140.4 137   POTASSIUM 3.9 3.6 3.9   CHLORIDE 106 100.8 106   CO2 27 25.2 26   ANIONGAP 4  --  5   GLC 95 103.8* 94   BUN 18 15.2 10   CR 0.63 0.6 0.54   AARON 10.1 10.2* 10.1       I personally reviewed the patient's outside records from [facility] EMR. Summary of pertinent findings in HPI.    Impression / Plan     1. Very mild Hypercalcemia.   2. Hyperparathyroidism  3. Hx of vitD deficiency  she was seen by Dr. Bernstein back in 2018. She saw Dr. Aguilar (Endocrine, [facility]) in 2012 and was diagnosed with primary hyperparathyroidism, and recommended parathyroidectomy due to osteoporosis. DXA scan then showed lowest T-score of -3.0 in the lumbar spine. Head and neck US then did not seem to localize any parathyroid adenoma. ptsorin was lost to f/up until 2015, when she saw Dr. Trent Bonilla (Endocrine). She was advised observation. NM parathyroid imaging 2018 negative.     Primary hyperparathyroidism versus secondary hyperparathyroidism due to vitamin D deficiency.  PTH remains elevated despite correcting vitamin D which makes primary hyperparathyroidism likely. Familial Hypocalciuric Hypercalemia is on the differential given mild hypercalcemia and mild PTH elevation.  She had a urine spot for calcium over creatinine ratio within normal limits back in 2012.  She declined to do a 24-hour urine collection for calcium and ENT referral in the past.    Her most recent labs 5/2024 with normal ca and PTH.  Would continue to monitor at least yearly.        4.  Osteoporosis  Risk factors include age postmenopausal, vitamin D deficiency and hyperparathyroidism.  Significant improvement in her BMD seen on DEXA scan 11/2022, stable on dxa 12/2023.  She was on Fosamax with poor compliance 2014 through 2018.  She had 1st dose of IV Reclast in 2018, second dose January 2022 and third dose 7/2023.    We reviewed her most recent dxa scan. We discussed the risk and benefit of continuing reclast for more than 3 consecutive doses. We  reviewed the risk of ONJ and AFF. She prefers to stop which is reasonable given her dxa results.  We will get bone turnover markers to monitor  Next dxa scan 12/2025.        Test and/or medications prescribed today:  Orders Placed This Encounter   Procedures    DX Bone Density    C-Telopeptide, Beta-Cross-Linked    N telopeptide cross linked urine    Vitamin D Deficiency (D3 Only)    Albumin level    Calcium    Parathyroid Hormone Intact    Creatinine    N telopeptide cross linked urine    C-Telopeptide, Beta-Cross-Linked       Follow up 1 year    35 minutes spent on the date of the encounter doing chart review, history and exam, documentation and further activities as noted above.       The longitudinal plan of care for the diagnosis(es)/condition(s) as documented were addressed during this visit. Due to the added complexity in care, I will continue to support Cindy in the subsequent management and with ongoing continuity of care.          Ivet Herrera MD  Endocrinology, Diabetes and Metabolism  [facility]            05/09/24 11:35 AM  PATIENT LAB/IMAGING STATUS : No pending lab orders  Kandy Malik

## 2024-06-06 LAB — VIT D+METAB SERPL-MCNC: 33 NG/ML (ref 20–50)

## 2024-06-07 LAB
COLLAGEN CTX SERPL-MCNC: 140 PG/ML
COLLAGEN NTX/CREAT UR-SRTO: 18
CREAT UR-MCNC: 123 MG/DL

## 2024-07-12 ENCOUNTER — TELEPHONE (OUTPATIENT)
Dept: ENDOCRINOLOGY | Facility: CLINIC | Age: 64
End: 2024-07-12
Payer: COMMERCIAL

## 2024-07-12 NOTE — TELEPHONE ENCOUNTER
With assistance of  Left Voicemail (1st Attempt) for the patient to call back and schedule the following:    Appointment type: Return endocrine  Provider: Dr. Herrera  Return date: 1 year (around June 2025)  Specialty phone number: [phone]  Additional appointment(s) needed: labs/DXA around or near June 2025 prior to follow-up visit  Additonal Notes: LVM x1 (no myc)

## 2024-07-17 ENCOUNTER — APPOINTMENT (OUTPATIENT)
Dept: INTERPRETER SERVICES | Facility: CLINIC | Age: 64
End: 2024-07-17
Payer: COMMERCIAL

## 2024-07-17 NOTE — TELEPHONE ENCOUNTER
Patient confirmed scheduled appointment:  Date: 6/9/25  Time: 11:30 am  Visit type: return endo  Provider: Dr. Herrera  Location: virtual  Testing/imaging: labs 6/2/25  Additional notes: patient will call to schedule DXA- scheduling number provided

## 2024-08-26 ENCOUNTER — ANCILLARY PROCEDURE (OUTPATIENT)
Dept: GENERAL RADIOLOGY | Facility: CLINIC | Age: 64
End: 2024-08-26
Attending: FAMILY MEDICINE
Payer: COMMERCIAL

## 2024-08-26 ENCOUNTER — OFFICE VISIT (OUTPATIENT)
Dept: FAMILY MEDICINE | Facility: CLINIC | Age: 64
End: 2024-08-26
Payer: COMMERCIAL

## 2024-08-26 VITALS
OXYGEN SATURATION: 97 % | WEIGHT: 170.1 LBS | BODY MASS INDEX: 31.3 KG/M2 | SYSTOLIC BLOOD PRESSURE: 133 MMHG | DIASTOLIC BLOOD PRESSURE: 84 MMHG | HEIGHT: 62 IN | TEMPERATURE: 98.4 F | HEART RATE: 72 BPM | RESPIRATION RATE: 16 BRPM

## 2024-08-26 DIAGNOSIS — Z59.9 FINANCIAL DIFFICULTIES: ICD-10-CM

## 2024-08-26 DIAGNOSIS — G89.29 CHRONIC RIGHT SHOULDER PAIN: ICD-10-CM

## 2024-08-26 DIAGNOSIS — M81.0 AGE-RELATED OSTEOPOROSIS WITHOUT CURRENT PATHOLOGICAL FRACTURE: ICD-10-CM

## 2024-08-26 DIAGNOSIS — M25.511 CHRONIC RIGHT SHOULDER PAIN: ICD-10-CM

## 2024-08-26 DIAGNOSIS — L90.5 SCAR PAIN: ICD-10-CM

## 2024-08-26 DIAGNOSIS — E78.2 MIXED HYPERLIPIDEMIA: Primary | ICD-10-CM

## 2024-08-26 PROCEDURE — 99215 OFFICE O/P EST HI 40 MIN: CPT | Performed by: FAMILY MEDICINE

## 2024-08-26 PROCEDURE — 73030 X-RAY EXAM OF SHOULDER: CPT | Mod: RT | Performed by: RADIOLOGY

## 2024-08-26 RX ORDER — ROSUVASTATIN CALCIUM 20 MG/1
20 TABLET, COATED ORAL DAILY
Qty: 90 TABLET | Refills: 0 | Status: SHIPPED | OUTPATIENT
Start: 2024-08-26 | End: 2024-11-24

## 2024-08-26 SDOH — ECONOMIC STABILITY - INCOME SECURITY: PROBLEM RELATED TO HOUSING AND ECONOMIC CIRCUMSTANCES, UNSPECIFIED: Z59.9

## 2024-08-26 NOTE — PROGRESS NOTES
Assessment & Plan     Mixed hyperlipidemia  ASCVD score is 10%. Shared decision making using statin decision aid and she would like to start medications. Instructed to take at night with a follow-up level in 8 weeks to determine dosing. She already has a very healthy diet and exercises when she can thought that is limited by a previous stroke.   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 She's experiencing significant ROM limitation in addition to experience of pain. X-ray results are available showing no osseous abnormality and no degenerative change of either AC or GH joints. Given all of the above findings, suspect this is related to rotator cuff and may need MRI to determine appropriate treatment. Start with physical therapy and monitor her response over the next 6 weeks. If she's getting worse, would start prior authorization for MRI, but most will only cover after 4-6 weeks of physical therapy.   - X-ray rt Shoulder G/E 3 vw  - Physical therapy referral    Age-related osteoporosis without current pathological fracture  She saw endocrine in June and had questions about their recommendations. She does want to pursue her yearly reclast infusions and had questions about insurance coverage for her DEXA scan next year. At this point, given that she is taking, her vitamin D and doing infusions, would recommend addressing insurance questions closer to scheduled DEXA.    Scar pain  From a previous CCY. Diclofenac has helped and she can continue this. In addition, she can use the same medication for her shoulder.  - diclofenac (VOLTAREN) 1 % topical gel  Dispense: 100 g; Refill: 11    Financial difficulties  Was able to get on food stamps and get cash assistance from [facility].  Eligible for Social Security in January of 2025. 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 Discussed some of the ramifications of [facility] and waiting for social security. Questions answered.    41 minutes spent by me on the date of the encounter doing chart review, history and exam, documentation and further activities per the note    No follow-ups on file.    Shana White is a 64 year old, presenting for the following health issues:  Pain (Right arm pain x weeks)        8/26/2024     1:59 PM   Additional Questions   Roomed by Pam   Accompanied by self         8/26/2024    Information    services provided? Yes   Language Chadian   Type of interpretation provided Face-to-face    name Paradise ELMORE   Right arm pain  She reports right arm pain since an immunization on May 31st for shingles. This pain is worsening. She was trying to use ice to help with pain but now pain is preventing her from being able to cover herself with a blanket. She denies any associated edema.     Abdominal pain  She reports that cream has helped abdominal pain. She is now out but requesting a refill of this for pain prevention.     Cholesterol  She endorses having a diet with vegetables. She denies any symptoms from her cholesterol levels. She denies previously being on cholesterol medication. She is open to starting a cholesterol medication.     Osteoporosis  She reports that she has an endocrinology appointment in June. She has to get a bone density scan in December of 2025. Endocrinology wanted yearly labs and for her to undergo 3 more reclast infusions.    Grief  She reports she is coping with the grief of losing her son. Her family is doing well.     Financial Strain  She is inquiring about [facility]. 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 She is working with social security disability and has received some funds and  "food stamps. She doesn't feel she would be able to work. She is not currently worried about making rent payments.     Review of Systems  Positive for: right arm pain  Negative for:     This document serves as a record of the services and decisions personally performed and made by Arleth Phelps MD. It was created on his/her behalf by Ortiz Mason, a trained medical scribe. The creation of this document is based the provider's statements to the medical scribe.  Scribe Ortiz Mason 2:04 PM, August 26, 2024       Objective    /84   Pulse 72   Temp 98.4  F (36.9  C) (Oral)   Resp 16   Ht 1.575 m (5' 2\")   Wt 77.2 kg (170 lb 1.6 oz)   LMP  (LMP Unknown)   SpO2 97%   BMI 31.11 kg/m    Body mass index is 31.11 kg/m .  Physical Exam   GENERAL: alert and no distress  MS: right shoulder no swelling or obvious deformity. She has very limited internal rotation and cannot reach back pocket area. External rotation is normal but causes pain at end range. Flexion is normal but causes pain in the end range. Tender to palpation over entire deltoid muscle and has pain with resisted internal and external rotation and resisted deltoid ad-duction.   NEURO: Strength is 5/5 for subscapularis but 4/5 for teres minor, supraspinatus, and infraspinatus.  PSYCH: mentation appears normal, affect normal/bright      Signed Electronically by: Arleth Phelps MD    "

## 2024-08-27 ENCOUNTER — TELEPHONE (OUTPATIENT)
Dept: FAMILY MEDICINE | Facility: CLINIC | Age: 64
End: 2024-08-27
Payer: COMMERCIAL

## 2024-08-27 NOTE — TELEPHONE ENCOUNTER
Reason for call: Schedule appointment     Attempt to reach: 1st    Outcome:Left voicemail    Detailed message left? Yes per Link- Follow up with me in 2 to 2.5 months for cholesterol and shoulder     Please return call to [facility]     Clinic phone number [phone]

## 2024-09-19 ENCOUNTER — THERAPY VISIT (OUTPATIENT)
Dept: PHYSICAL THERAPY | Facility: CLINIC | Age: 64
End: 2024-09-19
Attending: FAMILY MEDICINE
Payer: COMMERCIAL

## 2024-09-19 DIAGNOSIS — M67.911 TENDINOPATHY OF RIGHT ROTATOR CUFF: Primary | ICD-10-CM

## 2024-09-19 DIAGNOSIS — M25.511 CHRONIC RIGHT SHOULDER PAIN: ICD-10-CM

## 2024-09-19 DIAGNOSIS — G89.29 CHRONIC RIGHT SHOULDER PAIN: ICD-10-CM

## 2024-09-19 PROBLEM — M62.81 MUSCLE WEAKNESS (GENERALIZED): Status: ACTIVE | Noted: 2021-05-03

## 2024-09-19 PROCEDURE — 97110 THERAPEUTIC EXERCISES: CPT | Mod: GP

## 2024-09-19 PROCEDURE — 97161 PT EVAL LOW COMPLEX 20 MIN: CPT | Mod: GP

## 2024-09-19 NOTE — PROGRESS NOTES
PHYSICAL THERAPY EVALUATION  Type of Visit: Evaluation              Subjective   Had an injection in her shoulder in May of 2024; prior to the injection she couldn't use her arm to lift anything over her head or up. Reports she still has the pain; but the pain has subsided a little bit from the injection. Feels the pain limits her from lifting up heavy stuff (I.e., something equivalent to a gallon of milk).         Presenting condition or subjective complaint:    Date of onset: 03/19/24    Relevant medical history:     Dates & types of surgery:      Prior diagnostic imaging/testing results:       Prior therapy history for the same diagnosis, illness or injury:        Living Environment  Social support:     Type of home:     Stairs to enter the home:         Ramp:     Stairs inside the home:         Help at home:    Equipment owned:       Employment:      Hobbies/Interests:      Patient goals for therapy:      Pain assessment: Pain present  Describes the pain as throbbing and tender.  Pain at Worst: 9/10  Pain at Best: 5/10  Pain Currently: 5/10     Objective   UE ROM:  L:   -Flexion: WFL  -Abduction: WFL  -External Rotation:  -Internal Rotation:  R:  -Flexion: WFL* Px  -Abduction: WFL* Px  -External Rotation:  -Internal Rotation:    MMT:  L:  -Flexion: 4/5  -Scaption: 4/5  -Abduction: 4/5    R:  -Flexion: 4-/5*  -Scaption: 4-/5*  -Abduction: 4-/5*  *Px    Special Tests:  (+): Hawkin's Ang; (+) External Rotation Test; (+) Empty Can    Assessment & Plan   CLINICAL IMPRESSIONS  Medical Diagnosis: Chronic Right Shoulder Pain    Treatment Diagnosis: Rotator Cuff Tendinopathy   Impression/Assessment: Patient is a 64 year old female with Right shoulder pain complaints.  The following significant findings have been identified: Pain, Decreased ROM/flexibility, Decreased joint mobility, and Decreased strength. These impairments interfere with their ability to perform self care tasks, work tasks, recreational activities,  and household chores as compared to previous level of function.     Clinical Decision Making (Complexity):  Clinical Presentation: Stable/Uncomplicated  Clinical Presentation Rationale: based on medical and personal factors listed in PT evaluation  Clinical Decision Making (Complexity): Low complexity    PLAN OF CARE  Treatment Interventions:  Modalities: Cryotherapy, Hot Pack  Interventions: Manual Therapy, Neuromuscular Re-education, Therapeutic Activity, Therapeutic Exercise    Long Term Goals     PT Goal 1  Goal Identifier: Pain  Goal Description: Pt's goal is to decrease right shoulder pain to improve function and strength w/o severe pain  Rationale: to maximize safety and independence with performance of ADLs and functional tasks;to maximize safety and independence within the home;to maximize safety and independence with self cares  Goal Progress: Ongoing  Target Date: 12/12/24      Frequency of Treatment: 1-2x/week  Duration of Treatment: 12 weeks    Recommended Referrals to Other Professionals:  none  Education Assessment:   Learner/Method: Patient  Education Comments: No questions at this time    Risks and benefits of evaluation/treatment have been explained.   Patient/Family/caregiver agrees with Plan of Care.     Evaluation Time:     PT Eval, Low Complexity Minutes (48185): 15   Present: Yes: Language: Somalie, ID Number/Identifier: [id number]     Signing Clinician: Antwon Rios, PT        [facility]                                                                                   OUTPATIENT PHYSICAL THERAPY      PLAN OF TREATMENT FOR OUTPATIENT REHABILITATION   Patient's Last Name, First Name, Cindy Campos YOB: 1960   Provider's Name   [facility]   Medical Record No.  [id number]     Onset Date: 03/19/24  Start of Care Date: 09/19/24     Medical Diagnosis:  Chronic Right Shoulder Pain      PT Treatment  Diagnosis:  Rotator Cuff Tendinopathy Plan of Treatment  Frequency/Duration: 1-2x/week/ 12 weeks    Certification date from 09/19/24 to 12/12/24         See note for plan of treatment details and functional goals     nAtwon Rios, PT                         I CERTIFY THE NEED FOR THESE SERVICES FURNISHED UNDER        THIS PLAN OF TREATMENT AND WHILE UNDER MY CARE     (Physician attestation of this document indicates review and certification of the therapy plan).              Referring Provider:  Arleth Phelps    Initial Assessment  See Epic Evaluation- Start of Care Date: 09/19/24

## 2024-09-23 ENCOUNTER — THERAPY VISIT (OUTPATIENT)
Dept: PHYSICAL THERAPY | Facility: CLINIC | Age: 64
End: 2024-09-23
Payer: COMMERCIAL

## 2024-09-23 DIAGNOSIS — M62.81 MUSCLE WEAKNESS (GENERALIZED): Primary | ICD-10-CM

## 2024-09-23 DIAGNOSIS — G89.29 CHRONIC RIGHT SHOULDER PAIN: ICD-10-CM

## 2024-09-23 DIAGNOSIS — M67.911 TENDINOPATHY OF RIGHT ROTATOR CUFF: ICD-10-CM

## 2024-09-23 DIAGNOSIS — M25.511 CHRONIC RIGHT SHOULDER PAIN: ICD-10-CM

## 2024-09-23 PROCEDURE — 97110 THERAPEUTIC EXERCISES: CPT | Mod: GP

## 2024-10-07 ENCOUNTER — THERAPY VISIT (OUTPATIENT)
Dept: PHYSICAL THERAPY | Facility: CLINIC | Age: 64
End: 2024-10-07
Payer: COMMERCIAL

## 2024-10-07 DIAGNOSIS — M25.511 CHRONIC RIGHT SHOULDER PAIN: Primary | ICD-10-CM

## 2024-10-07 DIAGNOSIS — M67.911 TENDINOPATHY OF RIGHT ROTATOR CUFF: ICD-10-CM

## 2024-10-07 DIAGNOSIS — M62.81 MUSCLE WEAKNESS (GENERALIZED): ICD-10-CM

## 2024-10-07 DIAGNOSIS — G89.29 CHRONIC RIGHT SHOULDER PAIN: Primary | ICD-10-CM

## 2024-10-07 PROCEDURE — 97110 THERAPEUTIC EXERCISES: CPT | Mod: GP

## 2024-10-07 PROCEDURE — 97112 NEUROMUSCULAR REEDUCATION: CPT | Mod: GP

## 2024-10-07 PROCEDURE — 97140 MANUAL THERAPY 1/> REGIONS: CPT | Mod: GP

## 2024-10-22 ENCOUNTER — THERAPY VISIT (OUTPATIENT)
Dept: PHYSICAL THERAPY | Facility: CLINIC | Age: 64
End: 2024-10-22
Payer: COMMERCIAL

## 2024-10-22 DIAGNOSIS — M67.911 TENDINOPATHY OF RIGHT ROTATOR CUFF: ICD-10-CM

## 2024-10-22 DIAGNOSIS — G89.29 CHRONIC RIGHT SHOULDER PAIN: ICD-10-CM

## 2024-10-22 DIAGNOSIS — M25.511 CHRONIC RIGHT SHOULDER PAIN: ICD-10-CM

## 2024-10-22 DIAGNOSIS — M62.81 MUSCLE WEAKNESS (GENERALIZED): Primary | ICD-10-CM

## 2024-10-22 PROCEDURE — 97140 MANUAL THERAPY 1/> REGIONS: CPT | Mod: GP

## 2024-10-22 PROCEDURE — 97112 NEUROMUSCULAR REEDUCATION: CPT | Mod: GP

## 2024-10-22 PROCEDURE — 97110 THERAPEUTIC EXERCISES: CPT | Mod: GP

## 2024-10-29 ENCOUNTER — THERAPY VISIT (OUTPATIENT)
Dept: PHYSICAL THERAPY | Facility: CLINIC | Age: 64
End: 2024-10-29
Payer: COMMERCIAL

## 2024-10-29 DIAGNOSIS — G89.29 CHRONIC RIGHT SHOULDER PAIN: ICD-10-CM

## 2024-10-29 DIAGNOSIS — M25.511 CHRONIC RIGHT SHOULDER PAIN: ICD-10-CM

## 2024-10-29 DIAGNOSIS — M62.81 MUSCLE WEAKNESS (GENERALIZED): Primary | ICD-10-CM

## 2024-10-29 DIAGNOSIS — M67.911 TENDINOPATHY OF RIGHT ROTATOR CUFF: ICD-10-CM

## 2024-10-29 PROCEDURE — 97110 THERAPEUTIC EXERCISES: CPT | Mod: GP

## 2024-10-29 PROCEDURE — 97112 NEUROMUSCULAR REEDUCATION: CPT | Mod: GP

## 2024-11-01 ENCOUNTER — OFFICE VISIT (OUTPATIENT)
Dept: FAMILY MEDICINE | Facility: CLINIC | Age: 64
End: 2024-11-01
Payer: COMMERCIAL

## 2024-11-01 VITALS
DIASTOLIC BLOOD PRESSURE: 84 MMHG | TEMPERATURE: 97.6 F | SYSTOLIC BLOOD PRESSURE: 123 MMHG | BODY MASS INDEX: 31.89 KG/M2 | HEIGHT: 62 IN | OXYGEN SATURATION: 100 % | WEIGHT: 173.3 LBS | RESPIRATION RATE: 14 BRPM | HEART RATE: 79 BPM

## 2024-11-01 DIAGNOSIS — Z01.818 PREOPERATIVE EXAMINATION: Primary | ICD-10-CM

## 2024-11-01 DIAGNOSIS — Z86.73 HISTORY OF STROKE: ICD-10-CM

## 2024-11-01 DIAGNOSIS — H26.9 CATARACT OF LEFT EYE, UNSPECIFIED CATARACT TYPE: ICD-10-CM

## 2024-11-01 DIAGNOSIS — G44.229 CHRONIC TENSION-TYPE HEADACHE, NOT INTRACTABLE: ICD-10-CM

## 2024-11-01 DIAGNOSIS — E55.9 VITAMIN D DEFICIENCY: ICD-10-CM

## 2024-11-01 PROCEDURE — 99213 OFFICE O/P EST LOW 20 MIN: CPT | Performed by: FAMILY MEDICINE

## 2024-11-01 RX ORDER — CHOLECALCIFEROL (VITAMIN D3) 50 MCG
50 TABLET ORAL DAILY
Qty: 100 TABLET | Refills: 3 | Status: SHIPPED | OUTPATIENT
Start: 2024-11-01

## 2024-11-01 RX ORDER — ACETAMINOPHEN 500 MG
1000 TABLET ORAL EVERY 8 HOURS PRN
Qty: 100 TABLET | Refills: 11 | Status: SHIPPED | OUTPATIENT
Start: 2024-11-01

## 2024-11-01 NOTE — LETTER
November 1, 2024    [address] [facility] BUILDING   [facility] [address]      Dear Whom It May Concern:  Regarding Ms. White EDMAR Espinoza    I am the primary care provider for this patient who resides at the above address. She recently lost her son Osbaldo who lived with her and it was brought to my attention that she lives in a 2 bedroom apartment and the management has requested her to move to a one bedroom apartment.     She is grieving and also has some health needs of her own as a stroke survivor, and she has community support that already live in [address]. She is willing to move to a one bedroom apartment, but it would be very helpful for her if this new apartment could be in either [address] or [address] to help her access assistance more easily.      If you have any questions, please do not hesitate to contact me, or our Team Nurse, at clinic.     I thank you in advance for your help.         Respectfully,       Arleth Phelps MD

## 2024-11-01 NOTE — PROGRESS NOTES
Preoperative Evaluation  [address]  [address]  [facility] [address]  Phone: [phone]  Fax: [phone]  Primary Provider: Arleth Phelps MD  Pre-op Performing Provider: Arleth Phelps MD  Nov 1, 2024 11/1/2024   Surgical Information   What procedure is being done? eye    Facility or Hospital where procedure[facility]surgery [facility] be performed[facility]    Who is doing the procedure / surgery? Patricia Mehta    Date of surgery / procedure: 11/11/24    Time of surgery / procedure: unknow    Where do you plan to recover after surgery? at home with family        Patient-reported     Fax number for surgical facility: [phone]    Assessment & Plan     The proposed surgical procedure is considered LOW risk.    Preoperative examination; Cataract of left eye, unspecified cataract type  No concerns. Recommend proceeding with surgery. Can take all of her medications. Proceed with surgery and follow-up with another visit to complete advanced care planning documents.     History of stroke > 20 years ago,  before moving to   Distant history of stroke. Normal blood pressure. Requested a letter for some housing support. See communications and a paper copy was given to her at visit.         - No identified additional risk factors other than previously addressed    No held medications.     Recommendation  Approval given to proceed with proposed procedure, without further diagnostic evaluation.    Shana White is a 64 year old, presenting for the following:  Pre-Op Exam (Eye surgery )          11/1/2024     1:14 PM   Additional Questions   Roomed by Madhav         11/1/2024    Information    services provided? Yes   Language Indian   Type of interpretation provided Face-to-face    name Paradise ELMORE related to upcoming procedure: She has not had similar surgery in the past. She is having her left eye done first. She denies any  concerns about her upcoming procedure. States she would like her son, Scarlet, to make medical decisions in the event she is unable to.         11/1/2024   Pre-Op Questionnaire   Have you ever had a heart attack or stroke? No    Have you ever had surgery on your heart or blood vessels, such as a stent placement, a coronary artery bypass, or surgery on an artery in your head, neck, heart, or legs? No    Do you have chest pain with activity? No    Do you have a history of heart failure? No    Do you currently have a cold, bronchitis or symptoms of other infection? No    Do you have a cough, shortness of breath, or wheezing? No    Do you or anyone in your family have previous history of blood clots? No    Do you or does anyone in your family have a serious bleeding problem such as prolonged bleeding following surgeries or cuts? No    Have you ever had problems with anemia or been told to take iron pills? No    Have you had any abnormal blood loss such as black, tarry or bloody stools, or abnormal vaginal bleeding? No    Have you ever had a blood transfusion? No    Are you willing to have a blood transfusion if it is medically needed before, during, or after your surgery? Yes    Have you or any of your relatives ever had problems with anesthesia? No    Do you have sleep apnea, excessive snoring or daytime drowsiness? No    Do you have any artifical heart valves or other implanted medical devices like a pacemaker, defibrillator, or continuous glucose monitor? No    Do you have artificial joints? No    Are you allergic to latex? No        Patient-reported     Health Care Directive  Patient does not have a Health Care Directive: Discussed advance care planning with patient; information given to patient to review.    Preoperative Review of    reviewed - no record of controlled substances prescribed.      Patient Active Problem List    Diagnosis Date Noted    History of stroke 11/01/2024     Priority: Medium     Tendinopathy of right rotator cuff 09/19/2024     Priority: Medium    Financial difficulties 08/26/2024     Priority: Medium     Getting food stamps and some cash assistance from the [facility]. Eligible for social security Jan 2025. 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  2. Low-grade sprain of the medial collateral ligament proximally.  3. Low-grade injury to the meniscocapsular junction of the body of the  medial meniscus, free edge blunting of the body of the medial  meniscus, however, no evidence of significant medial meniscal tear.  4. Focal small area of likely articular cartilage delamination at the  chondral osseous junction with associated bone marrow edema involving  the weightbearing aspect of the lateral femoral condyle. Superficial  articular cartilage appears intact.  5. Large joint effusion.      Chronic pain of left knee 03/17/2023     Priority: Medium    History of allergy to radiographic contrast media 03/17/2023     Priority: Medium    Vitamin D deficiency 03/17/2023     Priority: Medium    Prepatellar bursitis of left knee 07/15/2022     Priority: Medium    Mixed hyperlipidemia 06/09/2021     Priority: Medium     ASCVD 10%, . Statin started Aug 2024.       Need for shingles vaccine 06/09/2021     Priority: Medium    Decreased sense of vibration 05/03/2021     Priority: Medium     nml work up incl SPEP      Muscle weakness (generalized) 05/03/2021     Priority: Medium     Chronic, left, no hx of stroke we know of      Dry eyes 01/18/2021     Priority: Medium    Hyperparathyroidism (H) 04/12/2018     Priority: Medium     2/2 parathyroid adenoma. [place]. Seen for 2nd opinion at [facility] who rec watch and wait due to normal Ca Levels multiple times. Primary hyperparathyroidism versus secondary hyperparathyroidism due to vitamin D deficiency.  PTH remains elevated despite correcting vitamin D which makes primary hyperparathyroidism likely. Familial Hypocalciuric Hypercalemia is on the differential given mild hypercalcemia and mild PTH elevation.  She had a urine spot for calcium over creatinine ratio within normal limits back in 2012.  She declined to do a 24-hour urine collection for calcium and ENT referral in the past.  - continue appropriate calcium replacement  -Continue vitamin D supplementation  -Repeat calcium, vitamin D, phosphorus, creatinine, albumin annually  -Next dexa 12/25      Chronic bilateral low back pain with right-sided sciatica 08/11/2015     Priority: Medium     5/2023: Mild L5 anterolisthesis w/ mild to mod neural impingement by CT done for abdominal pain .      TMJ (temporomandibular joint syndrome) 07/23/2014     Priority: Medium     7/18/14 ENT referral note, ordered CT (results pending), possible need for TMJ clinic referral.      Menopause present 09/11/2012     Priority: Medium    Fibromyalgia 09/11/2012     Priority: Medium    Chronic tension headaches 09/11/2012     Priority: Medium    Age-related osteoporosis without current pathological fracture 09/11/2012     Priority: Medium     Saw endocrine 6/24. On reclast yearly. Next dexa 12/2025      Hypercalcemia 07/03/2012     Priority: Medium     Possible primary PTH, last endo visit 7/12 - planned US      Abnormal Pap smear of cervix 05/31/2012     Priority: Medium     Class: Acute     5/31/12:  LSIL pap.  Recommendation- Colposcopy after 2 weeks of premarin vaginal cream.  8/28/12: Colpo - no obvious dysplasia.  Plan pap smear in 2 years - 8/2014.  12/15/16: NIL w/ neg HPV  PLAN: cotesting q5 yrs        Past Medical History:   Diagnosis Date    Ear pain     Left    Vitamin  D deficiency      Past Surgical History:   Procedure Laterality Date    LAPAROSCOPIC CHOLECYSTECTOMY  03/2019    [facility]     Current Outpatient Medications   Medication Sig Dispense Refill    acetaminophen (TYLENOL) 500 MG tablet Take 2 tablets (1,000 mg) by mouth every 8 hours as needed for pain 100 tablet 11    diclofenac (VOLTAREN) 1 % topical gel Apply 2 g topically 4 times daily. 100 g 11    fluticasone (FLONASE) 50 MCG/ACT nasal spray Spray 1-2 sprays into both nostrils daily      latanoprostene bunod (VYZULTA) 0.024 % SOLN ophthalmic solution Place 1 drop into both eyes At Bedtime 5 mL 1    meloxicam (MOBIC) 7.5 MG tablet Take 1 tablet (7.5 mg) by mouth daily 30 tablet 2    rosuvastatin (CRESTOR) 20 MG tablet Take 1 tablet (20 mg) by mouth daily. 90 tablet 0    vitamin D3 (CHOLECALCIFEROL) 50 mcg (2000 units) tablet Take 1 tablet (50 mcg) by mouth daily 100 tablet 3    XIIDRA 5 % opthalmic solution Place 1 drop into both eyes 2 times daily 60 each 11       Allergies   Allergen Reactions    Contrast Dye Itching and Rash    Iodine Rash     Pt states she is note allergic        Social History     Tobacco Use    Smoking status: Never     Passive exposure: Never    Smokeless tobacco: Never   Substance Use Topics    Alcohol use: No     Family History   Problem Relation Age of Onset    No Known Problems Mother     No Known Problems Father     Other - See Comments Son         xeroderma pigmentosum    Diabetes Son     Pulmonary Embolism Son     No Known Problems Son      History   Drug Use No             Review of Systems  Positive for:   Negative for:     This document serves as a record of the services and decisions personally performed and made by Arleth Phelps MD. It was created on his/her behalf by Ortiz Mason, a trained medical scribe. The creation of this document is based the provider's statements to the medical scribe.  Scribmita Mason 1:27 PM, November 1, 2024     Objective    /84   Pulse 79    "Temp 97.6  F (36.4  C) (Temporal)   Resp 14   Ht 1.575 m (5' 2\")   Wt 78.6 kg (173 lb 4.8 oz)   LMP  (LMP Unknown)   SpO2 100%   BMI 31.70 kg/m     Estimated body mass index is 31.7 kg/m  as calculated from the following:    Height as of this encounter: 1.575 m (5' 2\").    Weight as of this encounter: 78.6 kg (173 lb 4.8 oz).  Physical Exam  GENERAL: alert and no distress  EYES: Eyes grossly normal to inspection, PERRL and conjunctivae and sclerae normal  HENT: ear canals and TM's normal, nose and mouth without ulcers or lesions  NECK: no adenopathy, no asymmetry, masses, or scars  RESP: lungs clear to auscultation - no rales, rhonchi or wheezes  CV: regular rate and rhythm, normal S1 S2, no S3 or S4, no murmur, click or rub, no peripheral edema  SKIN: no suspicious lesions or rashes  NEURO: Moves slowly, antalgic gait  PSYCH: mentation appears normal, affect normal/bright    Recent Labs   Lab Test 05/31/24  1501      POTASSIUM 4.2   CR 0.65   A1C 5.6        Diagnostics  No labs were ordered during this visit.   No EKG required for low risk surgery (cataract, skin procedure, breast biopsy, etc).    Revised Cardiac Risk Index (RCRI)  The patient has the following serious cardiovascular risks for perioperative complications:   - No serious cardiac risks = 0 points     RCRI Interpretation: 0 points: Class I (very low risk - 0.4% complication rate)         Signed Electronically by: Arleth Phelps MD  A copy of this evaluation report is provided to the requesting physician.         "

## 2024-11-06 ENCOUNTER — THERAPY VISIT (OUTPATIENT)
Dept: PHYSICAL THERAPY | Facility: CLINIC | Age: 64
End: 2024-11-06
Payer: COMMERCIAL

## 2024-11-06 ENCOUNTER — DOCUMENTATION ONLY (OUTPATIENT)
Dept: FAMILY MEDICINE | Facility: CLINIC | Age: 64
End: 2024-11-06

## 2024-11-06 DIAGNOSIS — G89.29 CHRONIC RIGHT SHOULDER PAIN: ICD-10-CM

## 2024-11-06 DIAGNOSIS — M25.511 CHRONIC RIGHT SHOULDER PAIN: ICD-10-CM

## 2024-11-06 DIAGNOSIS — M62.81 MUSCLE WEAKNESS (GENERALIZED): Primary | ICD-10-CM

## 2024-11-06 DIAGNOSIS — M67.911 TENDINOPATHY OF RIGHT ROTATOR CUFF: ICD-10-CM

## 2024-11-06 PROCEDURE — 97110 THERAPEUTIC EXERCISES: CPT | Mod: GP

## 2024-11-06 PROCEDURE — 97112 NEUROMUSCULAR REEDUCATION: CPT | Mod: GP

## 2024-11-06 NOTE — PROGRESS NOTES
"When opening a documentation only encounter, be sure to enter in \"Chief Complaint\" Forms and in \" Comments\" Title of form, description if needed.    Cindy is a 64 year old  female  Form received via: Fax  Form now resides in: Provider Ready    [facility] - Verification of Disability    David Baeza               "

## 2024-11-08 NOTE — PROGRESS NOTES
Form has been completed by provider.     Form sent out via: Fax to [place] at Fax Number: [phone]  Patient informed: No, Reason:no  Output date: November 8, 2024    David Baeza      **Please close the encounter**

## 2024-11-14 ENCOUNTER — DOCUMENTATION ONLY (OUTPATIENT)
Dept: FAMILY MEDICINE | Facility: CLINIC | Age: 64
End: 2024-11-14
Payer: COMMERCIAL

## 2024-11-14 NOTE — CONFIDENTIAL NOTE
"When opening a documentation only encounter, be sure to enter in \"Chief Complaint\" Forms and in \" Comments\" Title of form, description if needed.    Cindy is a 64 year old  female  Form received via: Fax  Form now resides in: Provider Emilie Frye CMA                   "

## 2024-12-10 ENCOUNTER — TELEPHONE (OUTPATIENT)
Dept: FAMILY MEDICINE | Facility: CLINIC | Age: 64
End: 2024-12-10
Payer: COMMERCIAL

## 2024-12-10 NOTE — TELEPHONE ENCOUNTER
called patient with assistance from an  to follow up on patient's disability application questions.    Voicemail was left asking for patient to call back when able.    RACHEL Gambino

## 2024-12-11 NOTE — TELEPHONE ENCOUNTER
spoke with patient, with assistance from an , regarding disability benefits.    Patient is okay with scheduling appointment with a representative at the social security office for assistance with applying for benefits. SW provided patient with the phone number.     Patient will call to schedule an appointment.    RACHEL Gambino

## 2025-01-22 ENCOUNTER — OFFICE VISIT (OUTPATIENT)
Dept: FAMILY MEDICINE | Facility: CLINIC | Age: 65
End: 2025-01-22
Payer: COMMERCIAL

## 2025-01-22 VITALS
WEIGHT: 176.9 LBS | BODY MASS INDEX: 32.55 KG/M2 | HEIGHT: 62 IN | RESPIRATION RATE: 16 BRPM | TEMPERATURE: 97.8 F | OXYGEN SATURATION: 97 % | HEART RATE: 85 BPM | DIASTOLIC BLOOD PRESSURE: 85 MMHG | SYSTOLIC BLOOD PRESSURE: 141 MMHG

## 2025-01-22 DIAGNOSIS — G44.229 CHRONIC TENSION-TYPE HEADACHE, NOT INTRACTABLE: Primary | ICD-10-CM

## 2025-01-22 DIAGNOSIS — R03.0 ELEVATED BLOOD PRESSURE READING WITHOUT DIAGNOSIS OF HYPERTENSION: ICD-10-CM

## 2025-01-22 DIAGNOSIS — M54.2 CERVICALGIA: ICD-10-CM

## 2025-01-22 DIAGNOSIS — E78.2 MIXED HYPERLIPIDEMIA: ICD-10-CM

## 2025-01-22 DIAGNOSIS — Z86.73 HISTORY OF STROKE: ICD-10-CM

## 2025-01-22 DIAGNOSIS — Z02.89 ENCOUNTER FOR COMPLETION OF FORM WITH PATIENT: ICD-10-CM

## 2025-01-22 RX ORDER — CYCLOBENZAPRINE HCL 5 MG
5 TABLET ORAL
Qty: 30 TABLET | Refills: 0 | Status: SHIPPED | OUTPATIENT
Start: 2025-01-22

## 2025-01-22 ASSESSMENT — PAIN SCALES - GENERAL: PAINLEVEL_OUTOF10: MODERATE PAIN (5)

## 2025-01-22 NOTE — PATIENT INSTRUCTIONS
Bring blood pressure cuff in to next visit so we can make sure it is the right size.   Mention eye pain and blurry vision to your eye doctor.   Use heat to try and relax tight muscles in neck.   Muscle relaxer (flexeril) ordered. Use at night as needed.   Physical therapy ordered for help with pain.   OMT here at [facility] may also help with pain  Diclofenac/Voltaren gel can be used up to 4 times a day.   Labs today  Pneumonia vaccine today.   Follow-up in 2 months. We will reach out to you to schedule.

## 2025-01-22 NOTE — PROGRESS NOTES
Assessment & Plan     Chronic tension-type headache, not intractable; Cervicalgia  Significant muscle spasm likely worsening headache given that she doesn't have any other focal findings and no red flags. Tylenol helps as well as diclofenac which she can increase to up to 4 times a day. She has not started physical therapy which I recommended. She could also pair this with OMT here at clinic with our DOs. Can use 5 mg of Flexeril at night. Warned about sedation and mental slowing.  - cyclobenzaprine (FLEXERIL) 5 MG tablet  Dispense: 30 tablet; Refill: 0    Mixed hyperlipidemia  Started on a statin in August for risk factors of history of a stroke with ASCVD of 10%. She's tolerated therapy without side effects. She's due for repeat levels and titration of dose. If LDL is less than 100 then would continue with 200 mg of rosuvastatin  - Lipid panel reflex to direct LDL Non-fasting  - Lipid panel reflex to direct LDL Non-fasting    History of stroke; Encounter for completion of form with patient  Patient relies on assistance from neighbors with ADLs given that she's had a stroke. We have written letters in the past to her housing managers as after death of son they want her to move to a one bedroom apartment which is acceptable but need to be in building [address] or [address] where she has support that would be beneficial. Filled out paperwork today for disability status which was filled and returned, see scanned.    Elevated blood pressure reading without diagnosis of hypertension  Home blood pressures high but were taken while having headaches and were highest in the 150s/90s. Today her blood pressure is minimally elevated at 141/85. Does not have a history of hypertension. Recommend repeating home measurements when not having pain and bringing cuff to clinic for calibration and to assess cuff size.    35 minutes spent by me on the date of the encounter doing chart review, history and exam, documentation and further activities per  the note    Return in about 2 months (around 3/22/2025).    Shana White is a 65 year old, presenting for the following health issues:  Forms (Reasonable Accommodation) and Headache        1/22/2025     2:46 PM   Additional Questions   Roomed by David   Accompanied by Self         1/22/2025   Forms   Any forms needing to be completed Yes         1/22/2025    Information    services provided? Yes   Language Bangladeshi   Type of interpretation provided Face-to-face    name Paradise    Agency [employer]     Housing  Is requiring documentation for housing accommodations support.     Eye pain  She reports left sided headaches and pain behind her left eye. This pain is different and new. She does take medication, tylenol, for this which does help. She did not take any today. She notes that she did have bilateral cataract surgery. Her right eye vision is clear, but her left eye vision is blurriness. She notes that her left sided procedure was done first. Her next follow-up with her eye doctor is in two days, 1/24/25. Reports that she is eating and drinking well.     She reports that she does use heat therapy with some benefit. She does not recall using a muscle relaxer in the past.     Pain  Reports left knee pain and back pain. She feels that this pain has been worse recently. She notes that pain is generally worse when it is colder outside. Pain prevents cooking and cleaning. Has noticed a gradual increase in pain but no other changes. She does use diclofenac which does provide some relief. She does not use this four times a day.     Blood sugar  Reports that she is tolerating her medication with any side effects.     Blood pressure  She feels that her blood pressure has been high. She has been taking at home measurements. At home measurements are higher than clinic visits. She endorses that she took measurements when she was having headaches. She has not had high blood  "pressure in the past. She has used a community machine as well which showed elevated blood pressures, though no numbers that were as high as the at home measurements.     Review of Systems  Positive for:   Negative for:     This document serves as a record of the services and decisions personally performed and made by Arleth Phelps MD. It was created on his/her behalf by Ortiz Mason, a trained medical scribe. The creation of this document is based the provider's statements to the medical scribe.  Scribe Ortiz Mason 3:07 PM, January 22, 2025       Objective    BP (!) 141/85 (BP Location: Left arm, Patient Position: Sitting, Cuff Size: Adult Regular)   Pulse 85   Temp 97.8  F (36.6  C) (Oral)   Resp 16   Ht 1.575 m (5' 2\")   Wt 80.2 kg (176 lb 14.4 oz)   LMP  (LMP Unknown)   SpO2 97%   BMI 32.36 kg/m    Body mass index is 32.36 kg/m .  BP Readings from Last 6 Encounters:   01/22/25 (!) 141/85   11/29/24 110/77   11/01/24 123/84   08/26/24 133/84   06/05/24 133/81   05/31/24 117/82      Physical Exam   GENERAL: alert and no distress  MS: Left trap and likely rhomboid spasm. Left scalenes are also quite tight. Left SCM is very tight. Tender over the left occipital condyle. Non tender over the left temporal area.   PSYCH: mentation appears normal, affect normal/bright      Signed Electronically by: Arleth Phelps MD    "

## 2025-01-23 LAB
CHOLEST SERPL-MCNC: 131 MG/DL
FASTING STATUS PATIENT QL REPORTED: NO
HDLC SERPL-MCNC: 38 MG/DL
LDLC SERPL CALC-MCNC: 65 MG/DL
NONHDLC SERPL-MCNC: 93 MG/DL
TRIGL SERPL-MCNC: 139 MG/DL

## 2025-02-12 ENCOUNTER — DOCUMENTATION ONLY (OUTPATIENT)
Dept: FAMILY MEDICINE | Facility: CLINIC | Age: 65
End: 2025-02-12
Payer: MEDICAID

## 2025-02-12 NOTE — PROGRESS NOTES
Received update from [facility] care coordinator.    [facility] care coordinator met with patient. Patient reports she is not in need of any help in her home.  She reports being independent with all ADL's and IADL's with strong family and friends supports who all live near her in the [facility] buildings.      RACHEL Gambino

## 2025-03-17 ENCOUNTER — OFFICE VISIT (OUTPATIENT)
Dept: FAMILY MEDICINE | Facility: CLINIC | Age: 65
End: 2025-03-17
Payer: MEDICAID

## 2025-03-17 VITALS
DIASTOLIC BLOOD PRESSURE: 88 MMHG | RESPIRATION RATE: 14 BRPM | BODY MASS INDEX: 32.55 KG/M2 | HEIGHT: 62 IN | SYSTOLIC BLOOD PRESSURE: 134 MMHG | OXYGEN SATURATION: 96 % | HEART RATE: 73 BPM | TEMPERATURE: 97.5 F | WEIGHT: 176.9 LBS

## 2025-03-17 DIAGNOSIS — R06.09 DOE (DYSPNEA ON EXERTION): Primary | ICD-10-CM

## 2025-03-17 DIAGNOSIS — M54.41 CHRONIC BILATERAL LOW BACK PAIN WITH RIGHT-SIDED SCIATICA: ICD-10-CM

## 2025-03-17 DIAGNOSIS — Z96.1: ICD-10-CM

## 2025-03-17 DIAGNOSIS — H69.92 DYSFUNCTION OF LEFT EUSTACHIAN TUBE: ICD-10-CM

## 2025-03-17 DIAGNOSIS — G89.29 CHRONIC BILATERAL LOW BACK PAIN WITH RIGHT-SIDED SCIATICA: ICD-10-CM

## 2025-03-17 DIAGNOSIS — R03.0 ELEVATED BLOOD PRESSURE READING WITHOUT DIAGNOSIS OF HYPERTENSION: ICD-10-CM

## 2025-03-17 DIAGNOSIS — Z71.84 TRAVEL ADVICE ENCOUNTER: ICD-10-CM

## 2025-03-17 DIAGNOSIS — H40.1134 PRIMARY OPEN ANGLE GLAUCOMA (POAG) OF BOTH EYES, INDETERMINATE STAGE: ICD-10-CM

## 2025-03-17 DIAGNOSIS — Z98.49: ICD-10-CM

## 2025-03-17 RX ORDER — CYCLOSPORINE 0 G/ML
1 SOLUTION/ DROPS OPHTHALMIC; TOPICAL 2 TIMES DAILY
COMMUNITY
Start: 2024-12-26 | End: 2025-03-17

## 2025-03-17 RX ORDER — OXYMETAZOLINE HYDROCHLORIDE 0.05 G/100ML
2 SPRAY NASAL PRN
Qty: 15 ML | Refills: 0 | Status: SHIPPED | OUTPATIENT
Start: 2025-03-17

## 2025-03-17 RX ORDER — LATANOPROST 50 UG/ML
1 SOLUTION/ DROPS OPHTHALMIC DAILY
COMMUNITY
Start: 2025-01-14

## 2025-03-17 RX ORDER — ERYTHROMYCIN 5 MG/G
OINTMENT OPHTHALMIC AT BEDTIME
COMMUNITY
Start: 2025-02-08

## 2025-03-17 NOTE — PATIENT INSTRUCTIONS
Physical therapy order placed for your back.  I recommend seeing your ophthalmologist again.   Follow-up with me in June   Travel Clinic: [facility] [phone]; [address] [facility].

## 2025-03-17 NOTE — PROGRESS NOTES
Assessment & Plan     PAZ (dyspnea on exertion)  This problem has appeared in the last 1 year and has not significantly limited physical activity or ADLs. More noticeable with minimal activity, though since youngest son , she has not been as active. Walking speed affected by history of stroke. No red flag symptoms such as chest pain, sweating, arm pain, palpitations, or syncope. I suspect that this is deconditioning. We did talk about possibly pursuing a stress test. There is no significant family history of cardiac disease and she is actually going to visit her father [place] who is still alive.     We will increase activity such as walking and follow her symptoms. We reviewed red flag symptoms for which to present to the ER. If unable to tolerate increase in activity, we could proceed to stress testing.     Chronic bilateral low back pain with right-sided sciatica  Already engaged in physical therapy at [facility] for her neck and would like to add on exercises for her back. Has chronic long standing back pain with exaggerated lumbar lordosis. Referral placed.   - Physical Therapy  Referral    Primary open angle glaucoma (POAG) of both eyes, indeterminate stage; Hx of cataract removal with insertion of prosthetic lens  Updated problem and medication list. She is having some left eye pain and strain with reading that limits ability to read since last cataract removal. Recommended revisiting ophthalmology for revisit as it has not been relieved by reading glasses, artificial tears, and her regular regiment of glaucoma medications.     Elevated blood pressure reading without diagnosis of hypertension  She's had a couple of elevated here in the office and was then measuring high at home. Brought in home unit for calibration but cuff was too small and over reading blood pressure. Recommend taking blood pressure on right arm in office due to her left arm being affected by previous stroke. She also has  access to blood pressure readings in her building, so we are not ordering a home blood pressure monitor at this time.     Dysfunction of left eustachian tube  Discussed limiting use to 3 days at a time. She will plan to use for air travel.   - oxymetazoline (AFRIN) 0.05 % nasal spray  Dispense: 15 mL; Refill: 0     Travel advice encounter  Upcoming international travel planned. Needs to change some appointment. I reached out to care coordination. They will reach out to reschedule. Recommend going to travel clinic at [facility] for vaccines.       42 minutes spent by me on the date of the encounter doing chart review, history and exam, documentation and further activities per the note    No follow-ups on file.    Shana White is a 65 year old, presenting for the following health issues:  Follow Up        3/17/2025     2:40 PM   Additional Questions   Roomed by Madhav         3/17/2025    Information    services provided? Yes   Language Bhutanese   Type of interpretation provided Face-to-face    name [place]     HPI      Recent labs  Here to discuss recent labs. She is adherent to her cholesterol medication.     Blood pressure  Presents with her at home cuff. She usually measures blood pressure on the left arm as she struggles to place cuff, using left arm, on her right arm due to previous stroke.     At home cuff reading today is 141/87. Clinic blood pressure cuff reading today is 131/74.     Breathing  She reports some occasional shortness of breath when bending over and picking up objects. This shortness of breath is noticed by other and she has been asked if she has asthma. This is new in the last year. She does note some chest pain, but notes that she does have reflux. She denies sweatiness with shortness of breath. She denies any additional radiating pain into her shoulders or upper extremities. She gets this with simple cleaning. She does not clean her apartment. She denies lower  "extremity edema or racing heart. She denies recent worsening. She denies any light headedness or dizziness.     Pain  She reports that physical therapy is working with just her shoulder currently and not her back. She reports that she is doing at home exercises for her shoulder as well. She is noticing some improvement.     She reports improvement in her headaches but continued pain especially around her eyes that is worsened with reading. Pain is left sided. Prescription reading glasses have not helped with pain. She affirms that she has had bilateral cataract surgery. She has not returned to ophthalmology since surgery.     Medications  Reports that she did have a tooth infection that resolved with antibiotics, Amoxicillin  mg.    She does continue to use erythromycin ointment at night    She uses her glaucoma drops, Vyzulta, at night. Takes latanoprost daily. Started Xiidra after her cataract surgery.     Housing  She was able to get her new housing that fits her accommodations.     Travel  She is going to [facility] in May. She is looking for a travel clinic to go to before she leaves for her trip.     This document serves as a record of the services and decisions personally performed and made by Arleth Phelps MD. It was created on his/her behalf by Ortiz Mason, a trained medical scribe. The creation of this document is based the provider's statements to the medical scribe.  Scribe Ortiz Mason 2:50 PM, March 17, 2025       Objective    /88   Pulse 73   Temp 97.5  F (36.4  C) (Temporal)   Resp 14   Ht 1.575 m (5' 2\")   Wt 80.2 kg (176 lb 14.4 oz)   LMP  (LMP Unknown)   SpO2 96%   BMI 32.36 kg/m    Body mass index is 32.36 kg/m .  Physical Exam   GENERAL: alert and no distress  EYES: Normal gross appearance. No redness or drainage.   RESP: lungs clear to auscultation - no rales, rhonchi or wheezes  CV: regular rate and rhythm, normal S1 S2, no S3 or S4, no murmur, click or rub, no " peripheral edema  MS: Exaggerated lumbar lordosis. No pedal edema  PSYCH: mentation appears normal, affect normal/bright      Signed Electronically by: Arleth Phelps MD

## 2025-04-28 ENCOUNTER — APPOINTMENT (OUTPATIENT)
Dept: INTERPRETER SERVICES | Facility: CLINIC | Age: 65
End: 2025-04-28
Payer: COMMERCIAL

## 2025-06-02 ENCOUNTER — ANCILLARY PROCEDURE (OUTPATIENT)
Dept: BONE DENSITY | Facility: CLINIC | Age: 65
End: 2025-06-02
Attending: STUDENT IN AN ORGANIZED HEALTH CARE EDUCATION/TRAINING PROGRAM
Payer: COMMERCIAL

## 2025-06-02 ENCOUNTER — LAB (OUTPATIENT)
Dept: LAB | Facility: CLINIC | Age: 65
End: 2025-06-02
Payer: COMMERCIAL

## 2025-06-02 DIAGNOSIS — E21.3 HYPERPARATHYROIDISM: ICD-10-CM

## 2025-06-02 DIAGNOSIS — M81.0 AGE-RELATED OSTEOPOROSIS WITHOUT CURRENT PATHOLOGICAL FRACTURE: ICD-10-CM

## 2025-06-02 LAB
ALBUMIN SERPL BCG-MCNC: 4.2 G/DL (ref 3.5–5.2)
CALCIUM SERPL-MCNC: 10.2 MG/DL (ref 8.8–10.4)
CREAT SERPL-MCNC: 0.61 MG/DL (ref 0.51–0.95)
EGFRCR SERPLBLD CKD-EPI 2021: >90 ML/MIN/1.73M2
PTH-INTACT SERPL-MCNC: 89 PG/ML (ref 15–65)

## 2025-06-02 PROCEDURE — 82040 ASSAY OF SERUM ALBUMIN: CPT | Performed by: PATHOLOGY

## 2025-06-02 PROCEDURE — 36415 COLL VENOUS BLD VENIPUNCTURE: CPT | Performed by: PATHOLOGY

## 2025-06-02 PROCEDURE — 82523 COLLAGEN CROSSLINKS: CPT | Mod: 90 | Performed by: PATHOLOGY

## 2025-06-02 PROCEDURE — 77081 DXA BONE DENSITY APPENDICULR: CPT | Mod: XU | Performed by: INTERNAL MEDICINE

## 2025-06-02 PROCEDURE — 77085 DXA BONE DENSITY AXL VRT FX: CPT | Performed by: INTERNAL MEDICINE

## 2025-06-02 PROCEDURE — 82310 ASSAY OF CALCIUM: CPT | Performed by: PATHOLOGY

## 2025-06-02 PROCEDURE — 82565 ASSAY OF CREATININE: CPT | Performed by: PATHOLOGY

## 2025-06-02 PROCEDURE — T1013 SIGN LANG/ORAL INTERPRETER: HCPCS

## 2025-06-02 PROCEDURE — 99000 SPECIMEN HANDLING OFFICE-LAB: CPT | Performed by: PATHOLOGY

## 2025-06-02 PROCEDURE — 83970 ASSAY OF PARATHORMONE: CPT | Performed by: PATHOLOGY

## 2025-06-02 PROCEDURE — 82306 VITAMIN D 25 HYDROXY: CPT | Performed by: STUDENT IN AN ORGANIZED HEALTH CARE EDUCATION/TRAINING PROGRAM

## 2025-06-03 LAB — COLLAGEN CTX SERPL-MCNC: 180 PG/ML

## 2025-06-07 LAB
DEPRECATED CALCIDIOL+CALCIFEROL SERPL-MC: <59 UG/L (ref 20–75)
VITAMIN D2 SERPL-MCNC: <5 UG/L
VITAMIN D3 SERPL-MCNC: 54 UG/L

## 2025-06-16 ENCOUNTER — OFFICE VISIT (OUTPATIENT)
Dept: FAMILY MEDICINE | Facility: CLINIC | Age: 65
End: 2025-06-16
Payer: COMMERCIAL

## 2025-06-16 VITALS
RESPIRATION RATE: 16 BRPM | DIASTOLIC BLOOD PRESSURE: 80 MMHG | HEIGHT: 62 IN | SYSTOLIC BLOOD PRESSURE: 114 MMHG | OXYGEN SATURATION: 96 % | WEIGHT: 171.8 LBS | TEMPERATURE: 97.8 F | HEART RATE: 71 BPM | BODY MASS INDEX: 31.62 KG/M2

## 2025-06-16 DIAGNOSIS — M81.0 AGE-RELATED OSTEOPOROSIS WITHOUT CURRENT PATHOLOGICAL FRACTURE: ICD-10-CM

## 2025-06-16 DIAGNOSIS — G89.29 CHRONIC RIGHT SHOULDER PAIN: ICD-10-CM

## 2025-06-16 DIAGNOSIS — Z12.31 ENCOUNTER FOR SCREENING MAMMOGRAM FOR BREAST CANCER: ICD-10-CM

## 2025-06-16 DIAGNOSIS — M25.511 CHRONIC RIGHT SHOULDER PAIN: ICD-10-CM

## 2025-06-16 DIAGNOSIS — R87.612 LOW GRADE SQUAMOUS INTRAEPITHELIAL LESION ON CYTOLOGIC SMEAR OF CERVIX (LGSIL): Primary | ICD-10-CM

## 2025-06-16 PROBLEM — Z23 NEED FOR SHINGLES VACCINE: Status: RESOLVED | Noted: 2021-06-09 | Resolved: 2025-06-16

## 2025-06-16 PROCEDURE — 3074F SYST BP LT 130 MM HG: CPT | Performed by: FAMILY MEDICINE

## 2025-06-16 PROCEDURE — 3079F DIAST BP 80-89 MM HG: CPT | Performed by: FAMILY MEDICINE

## 2025-06-16 PROCEDURE — 99214 OFFICE O/P EST MOD 30 MIN: CPT | Performed by: FAMILY MEDICINE

## 2025-06-16 NOTE — PROGRESS NOTES
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     Age-related osteoporosis without current pathological fracture  Had follow-up DEXA showing 5% improvement, though still osteoporotic at -2.5 in the lumbar spine. Compliant with dietary calcium and supplementary vitamin D. Overdue for reclast. Sent message to endocrine to help get scheduled.     Chronic right shoulder pain  Doing PT. Improved, though still troublesome. Add on acupuncture.   - Other Specialty Referral    Encounter for screening mammogram for breast cancer  - MA Screening Bilateral w/ Uziel      2 or more chronic and stable illnesses:  Prescription drug management      Shana White is a 65 year old, presenting for the following health issues:  RECHECK        6/16/2025     2:00 PM   Additional Questions   Roomed by nou   Accompanied by self         6/16/2025    Information    services provided? Yes   Language Grenadian   Type of interpretation provided Telephone    name [facility]    Agency [facility]  Services     HPI      She is doing physical therapy and focusing on the shoulders. Wants to add back. Physical therapy improves symptoms for a couple of days. The pain comes and goes. Lifting something heavy, or sleeping on her side exacerbates pain.  Has done acupuncture in the past. Location unknown, but she did need a referral to go there.   Cracking in the knee when she walks    Denies COVID shot. She has already had 5 shots.     Bone health  Had a DEXA scan 6/2. Calcium intake includes drinking a glass of milk, greens, yogurt, and taking vitamin D supplement.     Review of Systems  Positive for:  Negative for:    This document serves as a record of the services and decisions personally performed and made by  "Arleth Phelps MD. It was created on his/her behalf by Naomy Savage, a trained medical scribe. The creation of this document is based the provider's statements to the medical scribe.  Scribe Naomy Savage 2:18 PM, June 16, 2025         Objective    /80   Pulse 71   Temp 97.8  F (36.6  C) (Temporal)   Resp 16   Ht 1.575 m (5' 2\")   Wt 77.9 kg (171 lb 12.8 oz)   LMP  (LMP Unknown)   SpO2 96%   BMI 31.42 kg/m    Body mass index is 31.42 kg/m .  Physical Exam   GENERAL: alert and no distress  MS: decreased right shoulder ROM  PSYCH: mentation appears normal, affect normal/bright        Signed Electronically by: Arleth Phelps MD    "

## 2025-06-16 NOTE — PATIENT INSTRUCTIONS
We will get you scheduled for a mammogram bus screening here.  I sent a generic referral to acupuncture. You can get this done, while doing physical therapy as well.   Follow up as needed.

## 2025-06-16 NOTE — Clinical Note
Hi, sorry I don't know the pool to send to - given her dexa results I assume the plan is to cont reclast. She is wondering about scheduling next dose. Looks from MAR like last was in 2023? Thanks, let me know if there is something I should do c

## 2025-06-17 ENCOUNTER — PATIENT OUTREACH (OUTPATIENT)
Dept: CARE COORDINATION | Facility: CLINIC | Age: 65
End: 2025-06-17
Payer: COMMERCIAL

## 2025-07-13 ENCOUNTER — HEALTH MAINTENANCE LETTER (OUTPATIENT)
Age: 65
End: 2025-07-13

## 2025-07-14 ENCOUNTER — OFFICE VISIT (OUTPATIENT)
Dept: FAMILY MEDICINE | Facility: CLINIC | Age: 65
End: 2025-07-14
Payer: COMMERCIAL

## 2025-07-14 VITALS
WEIGHT: 170.1 LBS | SYSTOLIC BLOOD PRESSURE: 124 MMHG | RESPIRATION RATE: 14 BRPM | HEIGHT: 62 IN | BODY MASS INDEX: 31.3 KG/M2 | OXYGEN SATURATION: 95 % | TEMPERATURE: 97 F | HEART RATE: 64 BPM | DIASTOLIC BLOOD PRESSURE: 85 MMHG

## 2025-07-14 DIAGNOSIS — G89.29 CHRONIC RIGHT SHOULDER PAIN: ICD-10-CM

## 2025-07-14 DIAGNOSIS — Z51.81 MEDICATION MONITORING ENCOUNTER: ICD-10-CM

## 2025-07-14 DIAGNOSIS — M25.511 CHRONIC RIGHT SHOULDER PAIN: ICD-10-CM

## 2025-07-14 DIAGNOSIS — M81.0 AGE-RELATED OSTEOPOROSIS WITHOUT CURRENT PATHOLOGICAL FRACTURE: Primary | ICD-10-CM

## 2025-07-14 PROCEDURE — 3074F SYST BP LT 130 MM HG: CPT | Performed by: FAMILY MEDICINE

## 2025-07-14 PROCEDURE — 99213 OFFICE O/P EST LOW 20 MIN: CPT | Performed by: FAMILY MEDICINE

## 2025-07-14 PROCEDURE — 3079F DIAST BP 80-89 MM HG: CPT | Performed by: FAMILY MEDICINE

## 2025-07-14 RX ORDER — DOXYCYCLINE HYCLATE 100 MG
1 TABLET ORAL DAILY
COMMUNITY
Start: 2025-07-01

## 2025-07-14 RX ORDER — CALCIUM CARBONATE 500 MG/1
1 TABLET, CHEWABLE ORAL DAILY
Qty: 90 TABLET | Refills: 3 | Status: SHIPPED | OUTPATIENT
Start: 2025-07-14 | End: 2026-07-14

## 2025-07-14 NOTE — PATIENT INSTRUCTIONS
"See if [facility] has a \"cancellation list,\" to see if you can get an appointment if someone cancels.   I ordered a chewable calcium supplement, that you can take once a day. This will improve your bone health, paired with the calcium you are getting in your diet.   Keep moving!   "

## 2025-07-14 NOTE — Clinical Note
Hello: I saw this pt of Dr. Marrero today. She saw her dexa results and would like to continue yearly reclast infusions. Looks like her last was in 2023. She is doing dietary calcium and we added supplement and supplementary vit D. Dr. BENAVIDES's note said she declined in jUne of 2024 but she would like to restart at this time. Please reach out to her if another visit is required or just restart the order. Thanks!  c

## 2025-07-14 NOTE — Clinical Note
Pt hs referral for acupuncture for chronic shoulder pain. Can you help with this? She tried a place the interprter rec'd but was full. I thought we had a [facility] referral too but I am not certain which location to guide her to. Thanks!

## 2025-07-14 NOTE — PROGRESS NOTES
Assessment & Plan     Age-related osteoporosis without current pathological fracture  She had questions about her DEXA scan, which we addressed. She's also wondering about her reclast scheduling. Last endocrine note shows they had planned to take a break from reclast for the risk of jaw osteonecrosis, but patient would like to resume. We reviewed this rare side effect. Sent a message to endocrine pool to get her scheduled. Dietary calcium is just below goal so we will add chewable tablets on top of her dietary intake.   - calcium carbonate (TUMS) 500 MG chewable tablet  Dispense: 90 tablet; Refill: 3    Medication monitoring encounter  She was started on a doxycycline and reports that she's taking it, but for an unknown condition. Advised her to look at the prescribing information on the bottle when she gets home.     Chronic right shoulder pain  She was unable to get into PT due to lack of staffing. She also was unable to get into acupuncture at the place at one of the interpretors had recommended. Sent a message to care coordination to get her set up with interpretors.       29 minutes spent by me on the date of the encounter doing chart review, history and exam, documentation and further activities per the note      Shana White is a 65 year old, presenting for the following health issues:  Follow Up        7/14/2025     1:08 PM   Additional Questions   Roomed by Madhav         7/14/2025    Information    services provided? Yes   Language Prydeinig   Type of interpretation provided Face-to-face    name amy ELMORE      Physical therapy  Did not get physical therapy at [facility] due to no availability and shortage of staff. She does not want to go to another place, and would rather wait.   As well as for acupuncture, they have no availability. She is willing to go to another location for this.   At home, she is able to move around and clean without getting out of breath.  "Although she cannot do things like laundry and dishes, because of her back. States that she can go up and down 1 flight of stairs, limited by SOB. But on a flat road, she doesn't get short of breath. Denies chest pain, but some pain in her knees.     Screenings  Upcoming mammogram scheduled.     Bones  Would like to review results from her DEXA scan.   In the past, she would take pills to treat her bones, but then switched to injections. Bone doctor notes that she wanted to stop medicating, but she reports that she never wanted to stop.   Not taking any calcium supplements. States she gets calcium through her diet, mainly by vegetables and drinking milk, once a day.   Reports when she is eating, she is hearing cracking in her jaw when chewing.     Medications  7/1 doxycycline refilled, for her eyes. Takes once a day and has no concerns or side effects. She does not remember who prescribed this for her.         This document serves as a record of the services and decisions personally performed and made by Arleth Phelps MD. It was created on his/her behalf by Naomy Savage, a trained medical scribe. The creation of this document is based the provider's statements to the medical scribe.  Scribe Naomy Savage 1:36 PM, July 14, 2025         Objective    /85   Pulse 64   Temp 97  F (36.1  C) (Temporal)   Resp 14   Ht 1.575 m (5' 2\")   Wt 77.2 kg (170 lb 1.6 oz)   LMP  (LMP Unknown)   SpO2 95%   BMI 31.11 kg/m    Body mass index is 31.11 kg/m .  Physical Exam   GENERAL: alert and no distress  PSYCH: mentation appears normal, affect normal/bright        Signed Electronically by: Arleth Phelps MD    "

## 2025-07-16 ENCOUNTER — TELEPHONE (OUTPATIENT)
Dept: ENDOCRINOLOGY | Facility: CLINIC | Age: 65
End: 2025-07-16
Payer: COMMERCIAL

## 2025-07-16 NOTE — TELEPHONE ENCOUNTER
Patient confirmed scheduled appointment:  Date: 09/24/2025  Time: 1:00p Virtual  Visit type: RETURN ENDOCRINE  Provider: Ivet Herrera MD  Location: [facility] DI[facility]ETES  Testing/imaging: N/A  Additional notes: Pt contacted and scheduled. Was curious why she was referred to see Sharon as she does not remember her. Informed her about the recent DXA results and osteoporosis Dx.     Sayda Mehta, RN  [id number]  Please schedule with Dr. Herrera. Last visit June 2024. First available time frame.    Angel Ritter on 7/16/2025 at 4:30 PM

## 2025-07-17 ENCOUNTER — DOCUMENTATION ONLY (OUTPATIENT)
Dept: FAMILY MEDICINE | Facility: CLINIC | Age: 65
End: 2025-07-17
Payer: COMMERCIAL

## 2025-07-17 NOTE — PROGRESS NOTES
"When opening a documentation only encounter, be sure to enter in \"Chief Complaint\" Forms and in \" Comments\" Title of form, description if needed.    Cindy is a 65 year old  female  Form received via: Fax  Form now resides in: Provider Ready    Special Diet Request    David Baeza MA              "

## 2025-08-06 ENCOUNTER — ANCILLARY PROCEDURE (OUTPATIENT)
Dept: MAMMOGRAPHY | Facility: CLINIC | Age: 65
End: 2025-08-06
Attending: FAMILY MEDICINE
Payer: COMMERCIAL

## 2025-08-06 DIAGNOSIS — Z12.31 ENCOUNTER FOR SCREENING MAMMOGRAM FOR BREAST CANCER: ICD-10-CM

## 2025-08-06 PROCEDURE — 77063 BREAST TOMOSYNTHESIS BI: CPT | Mod: TC | Performed by: RADIOLOGY

## 2025-08-06 PROCEDURE — 77067 SCR MAMMO BI INCL CAD: CPT | Mod: TC | Performed by: RADIOLOGY

## 2025-08-07 ENCOUNTER — PATIENT OUTREACH (OUTPATIENT)
Dept: CARE COORDINATION | Facility: CLINIC | Age: 65
End: 2025-08-07
Payer: COMMERCIAL

## 2025-08-20 ENCOUNTER — OFFICE VISIT (OUTPATIENT)
Dept: FAMILY MEDICINE | Facility: CLINIC | Age: 65
End: 2025-08-20
Payer: COMMERCIAL

## 2025-08-20 VITALS
HEIGHT: 62 IN | HEART RATE: 72 BPM | RESPIRATION RATE: 17 BRPM | DIASTOLIC BLOOD PRESSURE: 78 MMHG | BODY MASS INDEX: 31.69 KG/M2 | OXYGEN SATURATION: 98 % | WEIGHT: 172.2 LBS | SYSTOLIC BLOOD PRESSURE: 123 MMHG | TEMPERATURE: 97.8 F

## 2025-08-20 DIAGNOSIS — R70.0 ELEVATED ERYTHROCYTE SEDIMENTATION RATE: ICD-10-CM

## 2025-08-20 DIAGNOSIS — M62.838 MUSCLE SPASM: ICD-10-CM

## 2025-08-20 DIAGNOSIS — Z00.00 ROUTINE GENERAL MEDICAL EXAMINATION AT A HEALTH CARE FACILITY: Primary | ICD-10-CM

## 2025-08-20 DIAGNOSIS — R53.83 OTHER FATIGUE: ICD-10-CM

## 2025-08-20 DIAGNOSIS — Z78.9 HISTORY OF INTERNATIONAL TRAVEL: ICD-10-CM

## 2025-08-20 DIAGNOSIS — H69.90 DYSFUNCTION OF EUSTACHIAN TUBE, UNSPECIFIED LATERALITY: ICD-10-CM

## 2025-08-20 LAB
BASOPHILS # BLD AUTO: <0.04 10E3/UL (ref 0–0.2)
BASOPHILS NFR BLD AUTO: 0.7 %
EOSINOPHIL # BLD AUTO: 0.15 10E3/UL (ref 0–0.7)
EOSINOPHIL NFR BLD AUTO: 3.5 %
ERYTHROCYTE [DISTWIDTH] IN BLOOD BY AUTOMATED COUNT: 12.6 % (ref 10–15)
ERYTHROCYTE [SEDIMENTATION RATE] IN BLOOD BY WESTERGREN METHOD: 29 MM/HR (ref 0–30)
HCT VFR BLD AUTO: 38.8 % (ref 35–47)
HGB BLD-MCNC: 12.4 G/DL (ref 11.7–15.7)
IMM GRANULOCYTES # BLD: <0.04 10E3/UL
IMM GRANULOCYTES NFR BLD: 0 %
LYMPHOCYTES # BLD AUTO: 2.25 10E3/UL (ref 0.8–5.3)
LYMPHOCYTES NFR BLD AUTO: 51.8 %
MCH RBC QN AUTO: 29.6 PG (ref 26.5–33)
MCHC RBC AUTO-ENTMCNC: 32 G/DL (ref 31.5–36.5)
MCV RBC AUTO: 92.6 FL (ref 78–100)
MONOCYTES # BLD AUTO: 0.46 10E3/UL (ref 0–1.3)
MONOCYTES NFR BLD AUTO: 10.6 %
NEUTROPHILS # BLD AUTO: 1.45 10E3/UL (ref 1.6–8.3)
NEUTROPHILS NFR BLD AUTO: 33.4 %
PLATELET # BLD AUTO: 222 10E3/UL (ref 150–450)
RBC # BLD AUTO: 4.19 10E6/UL (ref 3.8–5.2)
WBC # BLD AUTO: 4.34 10E3/UL (ref 4–11)

## 2025-08-20 RX ORDER — CYCLOBENZAPRINE HCL 5 MG
5 TABLET ORAL
Qty: 30 TABLET | Refills: 1 | Status: SHIPPED | OUTPATIENT
Start: 2025-08-20

## 2025-08-20 RX ORDER — OXYMETAZOLINE HYDROCHLORIDE 0.05 G/100ML
2 SPRAY NASAL 2 TIMES DAILY
Qty: 15 ML | Refills: 0 | Status: SHIPPED | OUTPATIENT
Start: 2025-08-20

## 2025-08-20 SDOH — HEALTH STABILITY: PHYSICAL HEALTH: ON AVERAGE, HOW MANY MINUTES DO YOU ENGAGE IN EXERCISE AT THIS LEVEL?: 60 MIN

## 2025-08-20 SDOH — HEALTH STABILITY: PHYSICAL HEALTH: ON AVERAGE, HOW MANY DAYS PER WEEK DO YOU ENGAGE IN MODERATE TO STRENUOUS EXERCISE (LIKE A BRISK WALK)?: 3 DAYS

## 2025-08-20 ASSESSMENT — SOCIAL DETERMINANTS OF HEALTH (SDOH): HOW OFTEN DO YOU GET TOGETHER WITH FRIENDS OR RELATIVES?: MORE THAN THREE TIMES A WEEK

## 2025-08-20 ASSESSMENT — PAIN SCALES - GENERAL: PAINLEVEL_OUTOF10: NO PAIN (0)

## 2025-08-21 LAB — ANA SER QL IF: NEGATIVE
